# Patient Record
Sex: FEMALE | Race: WHITE | NOT HISPANIC OR LATINO | Employment: OTHER | URBAN - METROPOLITAN AREA
[De-identification: names, ages, dates, MRNs, and addresses within clinical notes are randomized per-mention and may not be internally consistent; named-entity substitution may affect disease eponyms.]

---

## 2017-01-13 RX ORDER — SYRINGE-NEEDLE,INSULIN,0.5 ML 27GX1/2"
1 SYRINGE, EMPTY DISPOSABLE MISCELLANEOUS 3 TIMES DAILY
Qty: 100 EACH | Refills: 3 | Status: SHIPPED | OUTPATIENT
Start: 2017-01-13 | End: 2018-01-29 | Stop reason: SDUPTHER

## 2017-01-16 ENCOUNTER — TELEPHONE (OUTPATIENT)
Dept: ENDOCRINOLOGY | Facility: CLINIC | Age: 72
End: 2017-01-16

## 2017-01-16 DIAGNOSIS — E11.65 UNCONTROLLED TYPE 2 DIABETES MELLITUS WITH COMPLICATION, UNSPECIFIED LONG TERM INSULIN USE STATUS: Primary | ICD-10-CM

## 2017-01-16 DIAGNOSIS — E11.8 UNCONTROLLED TYPE 2 DIABETES MELLITUS WITH COMPLICATION, UNSPECIFIED LONG TERM INSULIN USE STATUS: Primary | ICD-10-CM

## 2017-01-16 NOTE — TELEPHONE ENCOUNTER
----- Message from Blanche Garcia sent at 1/16/2017 12:15 PM EST -----  THE PATIENT IS CALLING IN REGARDS TO HER TOURJO AND HER NOVOLOG MEDICATION AND WOULD LIKE FOR YOU TO GIVE HER A CALL BACK TODAY. THE PATIENT IS STATING THAT SHE WILL BE OUT OF IT TODAY AND SHE IS NEEDING HER MEDICATION. HER CALL BACK NUMBER [phone]

## 2017-01-26 ENCOUNTER — OFFICE VISIT (OUTPATIENT)
Dept: ENDOCRINOLOGY | Facility: CLINIC | Age: 72
End: 2017-01-26

## 2017-01-26 VITALS
HEART RATE: 72 BPM | HEIGHT: 68 IN | WEIGHT: 214 LBS | OXYGEN SATURATION: 96 % | DIASTOLIC BLOOD PRESSURE: 80 MMHG | SYSTOLIC BLOOD PRESSURE: 140 MMHG | BODY MASS INDEX: 32.43 KG/M2

## 2017-01-26 DIAGNOSIS — G62.9 POLYNEUROPATHY: ICD-10-CM

## 2017-01-26 DIAGNOSIS — E11.8 UNCONTROLLED TYPE 2 DIABETES MELLITUS WITH COMPLICATION, UNSPECIFIED LONG TERM INSULIN USE STATUS: Primary | ICD-10-CM

## 2017-01-26 DIAGNOSIS — E11.65 UNCONTROLLED TYPE 2 DIABETES MELLITUS WITH COMPLICATION, UNSPECIFIED LONG TERM INSULIN USE STATUS: Primary | ICD-10-CM

## 2017-01-26 PROCEDURE — 99214 OFFICE O/P EST MOD 30 MIN: CPT | Performed by: INTERNAL MEDICINE

## 2017-01-26 NOTE — PROGRESS NOTES
"Subjective:     Chief Complaint   Patient presents with   • Diabetes     F/u for type 2 diabetes, pt stated that blood sugars have been running in the 200's. Fasting was 267 this a.m.      Romy Arias is a 71 y.o. female who is is here today for follow-up   for Type 2 diabetes mellitus.    The initial diagnosis of diabetes was made in [id number]  Diabetic complications: peripheral neuropathy and peripheral vascular disease    Current diabetic medications include  - Novolin 70/30 Relion - 80 units ac bkfst 10 -11 am & 80 units ac supper   metformin 500 mg - 1 tab bid without problems Checking blood sugar X 2/day.      Eye exam current (within one year): cataracts    Monitoring   2 times daily.    She forgot her meter today and her glucose log. Morning glucose is over 200, she takes insulin with meal.  Hypoglycemia: frequent nighttime hypoglycemia     Nutrition:   discussed  carb consistent diet 45-60 gm carb per meal. Doesnt eat breakfast, 2-3 pm she has lunch and a snack in the afternoon. Her schedule is inconsistent.   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     Past Medical History   Diagnosis Date   • Colon polyps    • Gallstone    • Rheumatic fever    • Sepsis due to urinary tract infection      The following portions of the patient's history were reviewed and updated as appropriate: allergies, current medications, past family history, past social history, past surgical history and problem list.    MEDICATIONS    Current Outpatient " "Prescriptions:   •  ALPRAZolam (XANAX) 0.25 MG tablet, Take  by mouth., Disp: , Rfl:   •  amitriptyline (ELAVIL) 75 MG tablet, Take  by mouth., Disp: , Rfl:   •  atenolol (TENORMIN) 50 MG tablet, Take  by mouth daily., Disp: , Rfl:   •  Cholecalciferol (VITAMIN D) 2000 UNITS capsule, Take  by mouth., Disp: , Rfl:   •  docusate sodium (DULCOLAX) 100 MG capsule, Take  by mouth 3 (three) times a day., Disp: , Rfl:   •  gabapentin (NEURONTIN) 400 MG capsule, , Disp: , Rfl: 0  •  insulin aspart (NOVOLOG FLEXPEN) 100 UNIT/ML solution pen-injector sc pen, Inject 20 Units under the skin 3 (Three) Times a Day With Meals., Disp: 30 mL, Rfl: 6  •  Insulin Glargine (TOUJEO SOLOSTAR) 300 UNIT/ML solution pen-injector, Inject 75 Units under the skin Daily., Disp: 30 mL, Rfl: 6  •  Insulin Pen Needle (BD PEN NEEDLE SAUL U/F) 32G X 4 MM misc, 1 each 5 (Five) Times a Day., Disp: 200 each, Rfl: 6  •  Insulin Syringe-Needle U-100 (BD INSULIN SYRINGE ULTRAFINE) 31G X 5/16\" 1 ML misc, 1 each 3 (Three) Times a Day., Disp: 100 each, Rfl: 3  •  lidocaine (LIDODERM) 5 %, Apply  topically., Disp: , Rfl:   •  lidocaine (XYLOCAINE) 5 % ointment, Apply  topically., Disp: , Rfl:   •  lisinopril-hydrochlorothiazide (PRINZIDE,ZESTORETIC) 20-12.5 MG per tablet, Take  by mouth daily., Disp: , Rfl:   •  metFORMIN (GLUCOPHAGE) 500 MG tablet, Take  by mouth., Disp: , Rfl:   •  Multiple Vitamin (MULTI-VITAMINS PO), Take  by mouth daily., Disp: , Rfl:   •  mupirocin (BACTROBAN) 2 % ointment, , Disp: , Rfl: 0  •  Mupirocin 2 % kit, Apply  topically 2 (two) times a day., Disp: , Rfl:   •  omeprazole OTC (PRILOSEC OTC) 20 MG EC tablet, Take  by mouth daily., Disp: , Rfl:   •  ondansetron (ZOFRAN) 8 MG tablet, Take  by mouth., Disp: , Rfl:   •  vitamin B-12 (CYANOCOBALAMIN) 100 MCG tablet, Take  by mouth daily., Disp: , Rfl:     Review of Systems  Review of Systems   Constitutional: Positive for fatigue and unexpected weight change.   HENT: Negative for " "congestion.    Eyes: Negative for visual disturbance.   Respiratory: Negative.  Negative for shortness of breath.    Cardiovascular: Negative.  Negative for chest pain and leg swelling.   Gastrointestinal: Negative for constipation, diarrhea and nausea.   Endocrine: Negative for cold intolerance, polydipsia and polyuria.   Musculoskeletal: Positive for arthralgias and back pain. Negative for joint swelling and myalgias.   Skin: Negative for rash and wound.   Allergic/Immunologic: Positive for environmental allergies.   Neurological: Positive for numbness. Negative for headaches.   Hematological: Negative.    Psychiatric/Behavioral: Negative for behavioral problems and self-injury.        Anxiety          Objective:        Visit Vitals   • /80   • Pulse 72   • Ht 68\" (172.7 cm)   • Wt 214 lb (97.1 kg)   • SpO2 96%   • BMI 32.54 kg/m2     Physical Exam   Constitutional: She is oriented to person, place, and time. She appears well-developed and well-nourished.   HENT:   Head: Normocephalic and atraumatic.   Neck: No tracheal deviation present. No thyromegaly present.   Cardiovascular: Normal rate, regular rhythm and normal heart sounds.    Pulmonary/Chest: Effort normal and breath sounds normal.   Musculoskeletal: Normal range of motion. She exhibits no edema, tenderness or deformity.   Neurological: She is alert and oriented to person, place, and time.   Skin: Skin is warm and dry.   Psychiatric: She has a normal mood and affect. Her behavior is normal. Judgment and thought content normal.   Nursing note and vitals reviewed.          LABS AND IMAGING    Office Visit on 12/06/2016   Component Date Value Ref Range Status   • Glucose 12/06/2016 268* 70 - 130 mg/dL Final   • Hemoglobin A1C 12/06/2016 9.4  % Final                 Assessment:         Diagnoses and all orders for this visit:    Uncontrolled type 2 diabetes mellitus with complication, unspecified long term insulin use " status    Polyneuropathy        Plan:      1.  RX changes: . Detailed instructions provided in written and verbal form. Insulin pen use and administration reviewed and taught.            Patient Instructions     Diabetes Treatment Recommendations  Patient     Romy Arias     Date:        01/26/17     ADA General Goals: A1c: < 7%                                                                                   Your A1C is   Lab Results   Component Value Date    HGBA1C 9.4 12/06/2016    HGBA1C 8.4 08/23/2016       Fasting/before meal glucose: <150 mg/dL                                    2 Hour after meal glucoses: < 180 mg/dL                                        Bedtime glucose:120-180                                                                Glucose testing frequency:     Medication Changes: discontinue Novolin 70/30    Insulin dosing:  Basal insulin Levemir , Toujeo , Lantus , Tresiba (U-200) 80 Units daily            Meal Insulin Apidra, Humalog (U-200), Novolog  or Regular   25 units units before meals, 15 units before snack   Correction insulin (add to meal insulin)   0 unit  if BS  less than 150   2unit   if BS  150 - 199   4 units if  - 249   6 units if  - 299   8 units if  - 349   10  units if BS Greater than 350                         Nutritional Recommendations:   3-4 carb portions per meal (45-60 gm of carbs) female    Physical Activity Goals:  Walk at least 15 min every day, ideally exercise 45-60 min most days (could be done in short bouts of 10-15 minutes.    Keep records of your glucose levels and insulin adjustments. We may ask you to keep records on the content of your meals with insulin doses and before/after meal glucose levels to evaluate your ratios.  Call for advice if you have unexplained or unexpected hypoglycemia  (glucose < 60) or persistent high glucose > 300.  Office: [phone]      Shelly Nichole MD              2.  Follow up:  3 months.

## 2017-01-26 NOTE — PATIENT INSTRUCTIONS
Diabetes Treatment Recommendations  Patient     Romy Arias     Date:        01/26/17     ADA General Goals: A1c: < 7%                                                                                   Your A1C is   Lab Results   Component Value Date    HGBA1C 9.4 12/06/2016    HGBA1C 8.4 08/23/2016       Fasting/before meal glucose: <150 mg/dL                                    2 Hour after meal glucoses: < 180 mg/dL                                        Bedtime glucose:120-180                                                                Glucose testing frequency:     Medication Changes: discontinue Novolin 70/30    Insulin dosing:  Basal insulin Levemir , Toujeo , Lantus , Tresiba (U-200) 80 Units daily            Meal Insulin Apidra, Humalog (U-200), Novolog  or Regular   25 units units before meals, 15 units before snack   Correction insulin (add to meal insulin)   0 unit  if BS  less than 150   2unit   if BS  150 - 199   4 units if  - 249   6 units if  - 299   8 units if  - 349   10  units if BS Greater than 350                         Nutritional Recommendations:   3-4 carb portions per meal (45-60 gm of carbs) female    Physical Activity Goals:  Walk at least 15 min every day, ideally exercise 45-60 min most days (could be done in short bouts of 10-15 minutes.    Keep records of your glucose levels and insulin adjustments. We may ask you to keep records on the content of your meals with insulin doses and before/after meal glucose levels to evaluate your ratios.  Call for advice if you have unexplained or unexpected hypoglycemia  (glucose < 60) or persistent high glucose > 300.  Office: [phone]      Shelly Nichole MD

## 2017-01-26 NOTE — LETTER
Patient Instructions     Diabetes Treatment Recommendations  Patient     Romy Arias     Date:        01/26/17     Fasting/before meal glucose: <150 mg/dL                                    2 Hour after meal glucoses: < 180 mg/dL                                        Bedtime glucose:120-180                                                                Glucose testing frequency:     Insulin dosing:  Basal insulin Levemir , Toujeo , Lantus , Tresiba (U-200) 80 Units daily            Meal Insulin Apidra, Humalog (U-200), Novolog  or Regular   25 units units before meals, 15 units before snack   Correction insulin (add to meal insulin)   0 unit  if BS  less than 150   2unit   if BS  150 - 199   4 units if  - 249   6 units if  - 299   8 units if  - 349   10  units if BS Greater than 350                         Nutritional Recommendations:   3-4 carb portions per meal (45-60 gm of carbs) female    Physical Activity Goals:  Walk at least 15 min every day, ideally exercise 45-60 min most days (could be done in short bouts of 10-15 minutes.    Keep records of your glucose levels and insulin adjustments. We may ask you to keep records on the content of your meals with insulin doses and before/after meal glucose levels to evaluate your ratios.  Call for advice if you have unexplained or unexpected hypoglycemia  (glucose < 60) or persistent high glucose > 300.  Office: [phone]      MD Shelly Bravo MD

## 2017-01-26 NOTE — MR AVS SNAPSHOT
"                        Romy Arias   1/26/2017 10:00 AM   Office Visit    Dept Phone:  [phone]   Encounter #:  [id number]    Provider:  Shelly CLEMENTS MD   Department:  [facility] INTERNAL MEDICINE AND ENDOCRINOLOGY                Your Full Care Plan              Today's Medication Changes          These changes are accurate as of: 1/26/17 10:49 AM.  If you have any questions, ask your nurse or doctor.               Stop taking medication(s)listed here:     omeprazole 20 MG capsule   Commonly known as:  priLOSEC   Stopped by:  Shelly CLEMENTS MD           OXYCONTIN 40 MG 12 hr tablet   Generic drug:  oxyCODONE ER   Stopped by:  Shelly CLEMENTS MD                      Your Updated Medication List          This list is accurate as of: 1/26/17 10:49 AM.  Always use your most recent med list.                ALPRAZolam 0.25 MG tablet   Commonly known as:  XANAX       amitriptyline 75 MG tablet   Commonly known as:  ELAVIL       atenolol 50 MG tablet   Commonly known as:  TENORMIN       DULCOLAX 100 MG capsule   Generic drug:  docusate sodium       gabapentin 400 MG capsule   Commonly known as:  NEURONTIN       insulin aspart 100 UNIT/ML solution pen-injector sc pen   Commonly known as:  NOVOLOG FLEXPEN   Inject 20 Units under the skin 3 (Three) Times a Day With Meals.       Insulin Glargine 300 UNIT/ML solution pen-injector   Commonly known as:  TOUJEO SOLOSTAR   Inject 75 Units under the skin Daily.       Insulin Pen Needle 32G X 4 MM misc   Commonly known as:  BD PEN NEEDLE SAUL U/F   1 each 5 (Five) Times a Day.       Insulin Syringe-Needle U-100 31G X 5/16\" 1 ML misc   Commonly known as:  BD INSULIN SYRINGE ULTRAFINE   1 each 3 (Three) Times a Day.       * lidocaine 5 %   Commonly known as:  LIDODERM       * lidocaine 5 % ointment   Commonly known as:  XYLOCAINE       lisinopril-hydrochlorothiazide 20-12.5 MG per tablet   Commonly known as:  PRINZIDE,ZESTORETIC       metFORMIN 500 MG tablet "   Commonly known as:  GLUCOPHAGE       MULTI-VITAMINS PO       * Mupirocin 2 % kit       * mupirocin 2 % ointment   Commonly known as:  BACTROBAN       ondansetron 8 MG tablet   Commonly known as:  ZOFRAN       PRILOSEC OTC 20 MG EC tablet   Generic drug:  omeprazole OTC       vitamin B-12 100 MCG tablet   Commonly known as:  CYANOCOBALAMIN       Vitamin D 2000 UNITS capsule       * Notice:  This list has 4 medication(s) that are the same as other medications prescribed for you. Read the directions carefully, and ask your doctor or other care provider to review them with you.            You Were Diagnosed With        Codes Comments    Uncontrolled type 2 diabetes mellitus with complication, unspecified long term insulin use status    -  Primary ICD-10-CM: E11.8, E11.65  ICD-9-CM: 250.92     Polyneuropathy     ICD-10-CM: G62.9  ICD-9-CM: 356.9       Instructions    Diabetes Treatment Recommendations  Patient     Romy Arias     Date:        01/26/17     ADA General Goals: A1c: < 7%                                                                                   Your A1C is   Lab Results   Component Value Date    HGBA1C 9.4 12/06/2016    HGBA1C 8.4 08/23/2016       Fasting/before meal glucose: <150 mg/dL                                    2 Hour after meal glucoses: < 180 mg/dL                                        Bedtime glucose:120-180                                                                Glucose testing frequency:     Medication Changes: discontinue Novolin 70/30    Insulin dosing:  Basal insulin Levemir , Toujeo , Lantus , Tresiba (U-200) 80 Units daily            Meal Insulin Apidra, Humalog (U-200), Novolog  or Regular   25 units units before meals, 15 units before snack   Correction insulin (add to meal insulin)   0 unit  if BS  less than 150   2unit   if BS  150 - 199   4 units if  - 249   6 units if  - 299   8 units if  - 349   10  units if BS Greater than 350                          Nutritional Recommendations:   3-4 carb portions per meal (45-60 gm of carbs) female    Physical Activity Goals:  Walk at least 15 min every day, ideally exercise 45-60 min most days (could be done in short bouts of 10-15 minutes.    Keep records of your glucose levels and insulin adjustments. We may ask you to keep records on the content of your meals with insulin doses and before/after meal glucose levels to evaluate your ratios.  Call for advice if you have unexplained or unexpected hypoglycemia  (glucose < 60) or persistent high glucose > 300.  Office: [phone]      Shelly Nichole MD         Patient Instructions History      Upcoming Appointments     Visit Type Date Time Department    FOLLOW UP 2017 10:00 AM [facility]    FOLLOW UP 2017 11:15 AM [facility]      MyChart Signup     [facility] [employer] allows you to send messages to your doctor, view your test results, renew your prescriptions, schedule appointments, and more. To sign up, go to [employer] and click on the Sign Up Now link in the New User? box. Enter your [employer] Activation Code exactly as it appears below along with the last four digits of your Social Security Number and your Date of Birth () to complete the sign-up process. If you do not sign up before the expiration date, you must request a new code.    [employer] Activation Code: [email or web address]  Expires: 2017 10:49 AM    If you have questions, you can email [email or web address] or call [phone] to talk to our [employer] staff. Remember, [employer] is NOT to be used for urgent needs. For medical emergencies, dial 911.               Other Info from Your Visit           Your Appointments     May 30, 2017 11:15 AM EDT   Follow Up with Shelly CLEMENTS MD   [facility]OCRINOLOG[facility] (--)    [address] [place] [id number] [phone]           Arrive 15 minutes  "prior to appointment.              Allergies     Demerol [Meperidine]      Dilantin [Phenytoin Sodium Extended]      Lyrica [Pregabalin]      Ultram [Tramadol]      Vioxx [Rofecoxib]        Reason for Visit     Diabetes F/u for type 2 diabetes, pt stated that blood sugars have been running in the 200's. Fasting was 267 this a.m.      Vital Signs     Blood Pressure Pulse Height Weight Oxygen Saturation Body Mass Index    140/80 72 68\" (172.7 cm) 214 lb (97.1 kg) 96% 32.54 kg/m2    Smoking Status                   Former Smoker           Problems and Diagnoses Noted     Diabetes with complication    Polyneuropathy        "

## 2017-03-02 ENCOUNTER — OFFICE VISIT (OUTPATIENT)
Dept: ENDOCRINOLOGY | Facility: CLINIC | Age: 72
End: 2017-03-02

## 2017-03-02 VITALS
OXYGEN SATURATION: 97 % | HEART RATE: 78 BPM | WEIGHT: 213.8 LBS | DIASTOLIC BLOOD PRESSURE: 80 MMHG | HEIGHT: 68 IN | SYSTOLIC BLOOD PRESSURE: 140 MMHG | BODY MASS INDEX: 32.4 KG/M2

## 2017-03-02 DIAGNOSIS — E11.65 UNCONTROLLED TYPE 2 DIABETES MELLITUS WITH COMPLICATION, UNSPECIFIED LONG TERM INSULIN USE STATUS: Primary | ICD-10-CM

## 2017-03-02 DIAGNOSIS — G62.9 POLYNEUROPATHY: ICD-10-CM

## 2017-03-02 DIAGNOSIS — E11.8 UNCONTROLLED TYPE 2 DIABETES MELLITUS WITH COMPLICATION, UNSPECIFIED LONG TERM INSULIN USE STATUS: Primary | ICD-10-CM

## 2017-03-02 DIAGNOSIS — I73.9 PERIPHERAL VASCULAR DISEASE (HCC): ICD-10-CM

## 2017-03-02 LAB
GLUCOSE BLDC GLUCOMTR-MCNC: 284 MG/DL (ref 70–130)
HBA1C MFR BLD: 10.5 %

## 2017-03-02 PROCEDURE — 82947 ASSAY GLUCOSE BLOOD QUANT: CPT | Performed by: INTERNAL MEDICINE

## 2017-03-02 PROCEDURE — 83036 HEMOGLOBIN GLYCOSYLATED A1C: CPT | Performed by: INTERNAL MEDICINE

## 2017-03-02 PROCEDURE — 99214 OFFICE O/P EST MOD 30 MIN: CPT | Performed by: INTERNAL MEDICINE

## 2017-03-02 NOTE — PATIENT INSTRUCTIONS
Diabetes Treatment Recommendations  Patient     Romy Arias     Date:        03/02/17     ADA General Goals: A1c: < 7%                                                                                   Your A1C is   Lab Results   Component Value Date    HGBA1C 10.5 03/02/2017    HGBA1C 9.4 12/06/2016    HGBA1C 8.4 08/23/2016       Fasting/before meal glucose: <150 mg/dL                                    2 Hour after meal glucoses: < 180 mg/dL                                        Bedtime glucose:120-180                                                                Glucose testing frequency:     Medication Changes: discontinue Novolin 70/30    Insulin dosing:  Basal insulin Levemir , Toujeo , Lantus , Tresiba (U-200) 80 Units daily            Meal Insulin Apidra, Humalog (U-200), Novolog  or Regular   35 units units before meals, 15 units before snack   Correction insulin (add to meal insulin)   0 unit  if BS  less than 150   2unit   if BS  150 - 199   4 units if  - 249   6 units if  - 299   8 units if  - 349   10  units if BS Greater than 350                         Nutritional Recommendations:   3-4 carb portions per meal (45-60 gm of carbs) female    Physical Activity Goals:  Walk at least 15 min every day, ideally exercise 45-60 min most days (could be done in short bouts of 10-15 minutes.    Keep records of your glucose levels and insulin adjustments. We may ask you to keep records on the content of your meals with insulin doses and before/after meal glucose levels to evaluate your ratios.  Call for advice if you have unexplained or unexpected hypoglycemia  (glucose < 60) or persistent high glucose > 300.  Office: [phone]      Shelly Nichole MD

## 2017-03-02 NOTE — PROGRESS NOTES
"Subjective:     Chief Complaint   Patient presents with   • Diabetes     F/u for type 2 diabetes, testing 2x qd. C/o elevations in blood sugar readings, pt stated she has been eating more sweets than normal.       Romy Arias is a 71 y.o. female who is is here today for follow-up   for Type 2 diabetes mellitus.    The initial diagnosis of diabetes was made in [id number]  Diabetic complications: peripheral neuropathy and peripheral vascular disease    Current diabetic medications include  - Novolin 70/30 Relion - 80 units ac bkfst 10 -11 am & 80 units ac supper   metformin 500 mg - 1 tab bid without problems Checking blood sugar X 2/day.      Eye exam current (within one year): cataracts    Monitoring   2 times daily.    She forgot her meter today and her glucose log. Morning glucose is over 200, she takes insulin with meal.  Hypoglycemia: frequent nighttime hypoglycemia     Nutrition:   discussed  carb consistent diet 45-60 gm carb per meal. Doesnt eat breakfast, 2-3 pm she has lunch and a snack in the afternoon. Her schedule is inconsistent.   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     Past Medical History   Diagnosis Date   • Colon polyps    • Gallstone    • Rheumatic fever    • Sepsis due to urinary tract infection      The following portions of the patient's history were reviewed and updated as appropriate: allergies, current medications, past family history, past social history, past surgical history and problem list.    MEDICATIONS    Current Outpatient Prescriptions:   •  ALPRAZolam (XANAX) 0.25 MG tablet, Take  by mouth., Disp: , Rfl:   •  amitriptyline (ELAVIL) 75 MG tablet, Take  by mouth., Disp: , Rfl:   •  atenolol (TENORMIN) 50 MG tablet, " "Take  by mouth daily., Disp: , Rfl:   •  Cholecalciferol (VITAMIN D) 2000 UNITS capsule, Take  by mouth., Disp: , Rfl:   •  docusate sodium (DULCOLAX) 100 MG capsule, Take  by mouth 3 (three) times a day., Disp: , Rfl:   •  gabapentin (NEURONTIN) 400 MG capsule, , Disp: , Rfl: 0  •  insulin aspart (NOVOLOG FLEXPEN) 100 UNIT/ML solution pen-injector sc pen, Inject 30 Units under the skin 3 (Three) Times a Day Before Meals., Disp: 10 pen, Rfl: 6  •  Insulin Glargine (TOUJEO SOLOSTAR) 300 UNIT/ML solution pen-injector, Inject 80 Units under the skin Daily., Disp: 10 pen, Rfl: 6  •  Insulin Pen Needle (BD PEN NEEDLE SAUL U/F) 32G X 4 MM misc, 1 each 5 (Five) Times a Day., Disp: 200 each, Rfl: 6  •  Insulin Syringe-Needle U-100 (BD INSULIN SYRINGE ULTRAFINE) 31G X 5/16\" 1 ML misc, 1 each 3 (Three) Times a Day., Disp: 100 each, Rfl: 3  •  lidocaine (LIDODERM) 5 %, Apply  topically., Disp: , Rfl:   •  lidocaine (XYLOCAINE) 5 % ointment, Apply  topically., Disp: , Rfl:   •  lisinopril-hydrochlorothiazide (PRINZIDE,ZESTORETIC) 20-12.5 MG per tablet, Take  by mouth daily., Disp: , Rfl:   •  metFORMIN (GLUCOPHAGE) 500 MG tablet, Take 1 tablet by mouth Daily With Breakfast., Disp: 90 tablet, Rfl: 1  •  Multiple Vitamin (MULTI-VITAMINS PO), Take  by mouth daily., Disp: , Rfl:   •  mupirocin (BACTROBAN) 2 % ointment, , Disp: , Rfl: 0  •  Mupirocin 2 % kit, Apply  topically 2 (two) times a day., Disp: , Rfl:   •  omeprazole OTC (PRILOSEC OTC) 20 MG EC tablet, Take  by mouth daily., Disp: , Rfl:   •  ondansetron (ZOFRAN) 8 MG tablet, Take  by mouth., Disp: , Rfl:   •  vitamin B-12 (CYANOCOBALAMIN) 100 MCG tablet, Take  by mouth daily., Disp: , Rfl:     Review of Systems  Review of Systems   Constitutional: Positive for fatigue and unexpected weight change.   HENT: Negative for congestion.    Eyes: Negative for visual disturbance.   Respiratory: Negative.  Negative for shortness of breath.    Cardiovascular: Negative.  Negative for " "chest pain and leg swelling.   Gastrointestinal: Negative for constipation, diarrhea and nausea.   Endocrine: Negative for cold intolerance, polydipsia and polyuria.   Musculoskeletal: Positive for arthralgias and back pain. Negative for joint swelling and myalgias.   Skin: Negative for rash and wound.   Allergic/Immunologic: Positive for environmental allergies.   Neurological: Positive for numbness. Negative for headaches.   Hematological: Negative.    Psychiatric/Behavioral: Negative for behavioral problems and self-injury.        Anxiety          Objective:        Visit Vitals   • /80   • Pulse 78   • Ht 68\" (172.7 cm)   • Wt 213 lb 12.8 oz (97 kg)   • SpO2 97%   • BMI 32.51 kg/m2     Physical Exam   Constitutional: She is oriented to person, place, and time. She appears well-developed and well-nourished.   HENT:   Head: Normocephalic and atraumatic.   Neck: No tracheal deviation present. No thyromegaly present.   Cardiovascular: Normal rate, regular rhythm and normal heart sounds.    Pulmonary/Chest: Effort normal and breath sounds normal.   Musculoskeletal: Normal range of motion. She exhibits no edema, tenderness or deformity.   Neurological: She is alert and oriented to person, place, and time.   Skin: Skin is warm and dry.   Psychiatric: She has a normal mood and affect. Her behavior is normal. Judgment and thought content normal.   Nursing note and vitals reviewed.          LABS AND IMAGING    Office Visit on 03/02/2017   Component Date Value Ref Range Status   • Glucose 03/02/2017 284* 70 - 130 mg/dL Final   • Hemoglobin A1C 03/02/2017 10.5  % Final   Office Visit on 12/06/2016   Component Date Value Ref Range Status   • Glucose 12/06/2016 268* 70 - 130 mg/dL Final   • Hemoglobin A1C 12/06/2016 9.4  % Final                 Assessment:         Diagnoses and all orders for this visit:    Uncontrolled type 2 diabetes mellitus with complication, unspecified long term insulin use status  -     POC " Glucose Fingerstick  -     POC Glycosylated Hemoglobin (Hb A1C)  -     insulin aspart (NOVOLOG FLEXPEN) 100 UNIT/ML solution pen-injector sc pen; Inject 30 Units under the skin 3 (Three) Times a Day Before Meals.  -     Insulin Glargine (TOUJEO SOLOSTAR) 300 UNIT/ML solution pen-injector; Inject 80 Units under the skin Daily.    Polyneuropathy    Peripheral vascular disease        Plan:      1.  RX changes: . Detailed instructions provided in written and verbal form. Insulin pen use and administration reviewed and taught.            Patient Instructions     Diabetes Treatment Recommendations  Patient     Romy Arias     Date:        03/02/17     ADA General Goals: A1c: < 7%                                                                                   Your A1C is   Lab Results   Component Value Date    HGBA1C 10.5 03/02/2017    HGBA1C 9.4 12/06/2016    HGBA1C 8.4 08/23/2016       Fasting/before meal glucose: <150 mg/dL                                    2 Hour after meal glucoses: < 180 mg/dL                                        Bedtime glucose:120-180                                                                Glucose testing frequency:     Medication Changes: discontinue Novolin 70/30    Insulin dosing:  Basal insulin Levemir , Toujeo , Lantus , Tresiba (U-200) 80 Units daily            Meal Insulin Apidra, Humalog (U-200), Novolog  or Regular   35 units units before meals, 15 units before snack   Correction insulin (add to meal insulin)   0 unit  if BS  less than 150   2unit   if BS  150 - 199   4 units if  - 249   6 units if  - 299   8 units if  - 349   10  units if BS Greater than 350                         Nutritional Recommendations:   3-4 carb portions per meal (45-60 gm of carbs) female    Physical Activity Goals:  Walk at least 15 min every day, ideally exercise 45-60 min most days (could be done in short bouts of 10-15 minutes.    Keep records of your glucose levels and  insulin adjustments. We may ask you to keep records on the content of your meals with insulin doses and before/after meal glucose levels to evaluate your ratios.  Call for advice if you have unexplained or unexpected hypoglycemia  (glucose < 60) or persistent high glucose > 300.  Office: [phone]      Shelly Nichole MD              2.  Follow up:  3 months.

## 2017-03-30 ENCOUNTER — TELEPHONE (OUTPATIENT)
Dept: ENDOCRINOLOGY | Facility: CLINIC | Age: 72
End: 2017-03-30

## 2017-03-30 NOTE — TELEPHONE ENCOUNTER
Returned pt's call and informed her that we currently did not have any samples but should have some tomorrow or by the beginning of next week. Pt said that she will give our office a call back tomorrow.

## 2017-03-30 NOTE — TELEPHONE ENCOUNTER
----- Message from Brisa Odell sent at 3/30/2017 11:17 AM EDT -----  Contact: PATIENT   RE: SAMPLES    PATIENT IS REQUESTING 4 SAMPLES OF NOVOLOG FLEX PEN. SHE STATES THIS COSTS TOO MUCH FOR HER TO PURCHASE FROM PHARMACY.    CALL BACK #[phone]

## 2017-04-10 ENCOUNTER — TELEPHONE (OUTPATIENT)
Dept: INTERNAL MEDICINE | Facility: CLINIC | Age: 72
End: 2017-04-10

## 2017-04-10 NOTE — TELEPHONE ENCOUNTER
Returned pt's call and informed her that I have placed samples in the fridge for her to  at her convenience.

## 2017-04-10 NOTE — TELEPHONE ENCOUNTER
----- Message from Blanche Garcia sent at 4/10/2017  1:32 PM EDT -----  THE PATIENT IS CALLING IN REGARDS TO HER NOVOLOG SAMPLES AND WOULD LIKE FOR YOU TO GIVE HER A CALL WHEN THERE READY FOR . THE PATIENT'S CALL BACK NUMBER -[phone]

## 2017-05-22 ENCOUNTER — OFFICE VISIT (OUTPATIENT)
Dept: ENDOCRINOLOGY | Facility: CLINIC | Age: 72
End: 2017-05-22

## 2017-05-22 VITALS
HEIGHT: 67 IN | BODY MASS INDEX: 33.71 KG/M2 | OXYGEN SATURATION: 98 % | WEIGHT: 214.8 LBS | SYSTOLIC BLOOD PRESSURE: 132 MMHG | DIASTOLIC BLOOD PRESSURE: 80 MMHG | HEART RATE: 76 BPM

## 2017-05-22 DIAGNOSIS — E11.65 UNCONTROLLED TYPE 2 DIABETES MELLITUS WITH COMPLICATION, UNSPECIFIED LONG TERM INSULIN USE STATUS: Primary | ICD-10-CM

## 2017-05-22 DIAGNOSIS — E11.8 UNCONTROLLED TYPE 2 DIABETES MELLITUS WITH COMPLICATION, UNSPECIFIED LONG TERM INSULIN USE STATUS: Primary | ICD-10-CM

## 2017-05-22 DIAGNOSIS — G62.9 POLYNEUROPATHY: ICD-10-CM

## 2017-05-22 LAB
GLUCOSE BLDC GLUCOMTR-MCNC: 395 MG/DL (ref 70–130)
HBA1C MFR BLD: 10.6 %

## 2017-05-22 PROCEDURE — 82947 ASSAY GLUCOSE BLOOD QUANT: CPT | Performed by: INTERNAL MEDICINE

## 2017-05-22 PROCEDURE — 99214 OFFICE O/P EST MOD 30 MIN: CPT | Performed by: INTERNAL MEDICINE

## 2017-05-22 PROCEDURE — 83036 HEMOGLOBIN GLYCOSYLATED A1C: CPT | Performed by: INTERNAL MEDICINE

## 2017-05-24 ENCOUNTER — TELEPHONE (OUTPATIENT)
Dept: INTERNAL MEDICINE | Facility: CLINIC | Age: 72
End: 2017-05-24

## 2017-07-03 ENCOUNTER — TELEPHONE (OUTPATIENT)
Dept: INTERNAL MEDICINE | Facility: CLINIC | Age: 72
End: 2017-07-03

## 2017-07-03 NOTE — TELEPHONE ENCOUNTER
PATIENT WOULD LIKE A REFILL ON METFORMIN-HCL-500 MG TABLET ( 1 A DAY). PATIENT WOULD LIKE IT SENT TO [facility] IN [facility]. PATIENT WOULD LIKE A CALL ABCK WHEN THE PRESCRIPTION HAS BEEN FILLED. THANK YOU.

## 2017-07-27 DIAGNOSIS — E11.8 UNCONTROLLED TYPE 2 DIABETES MELLITUS WITH COMPLICATION, UNSPECIFIED LONG TERM INSULIN USE STATUS: ICD-10-CM

## 2017-07-27 DIAGNOSIS — E11.65 UNCONTROLLED TYPE 2 DIABETES MELLITUS WITH COMPLICATION, UNSPECIFIED LONG TERM INSULIN USE STATUS: ICD-10-CM

## 2017-07-27 NOTE — TELEPHONE ENCOUNTER
DR MONTES DE OCA,    MS SERG CALLED FOR REFILL, SHE IS USING A SLIDING SCALE AND IS RUNNING OUT OF Stand In EARLY. SHE WOULD ALSO LIKE TO KNOW IF IT WOULD BE POSSIBLE TO MAIL SAMPLES TO HER.

## 2017-07-28 DIAGNOSIS — E11.65 UNCONTROLLED TYPE 2 DIABETES MELLITUS WITH COMPLICATION, UNSPECIFIED LONG TERM INSULIN USE STATUS: ICD-10-CM

## 2017-07-28 DIAGNOSIS — E11.8 UNCONTROLLED TYPE 2 DIABETES MELLITUS WITH COMPLICATION, UNSPECIFIED LONG TERM INSULIN USE STATUS: ICD-10-CM

## 2017-09-20 ENCOUNTER — HOSPITAL ENCOUNTER (INPATIENT)
Facility: HOSPITAL | Age: 72
LOS: 3 days | Discharge: NURSING, REHAB OR LONG-TERM CARE | End: 2017-09-23
Attending: FAMILY MEDICINE | Admitting: INTERNAL MEDICINE

## 2017-09-20 ENCOUNTER — APPOINTMENT (OUTPATIENT)
Dept: CT IMAGING | Facility: HOSPITAL | Age: 72
End: 2017-09-20

## 2017-09-20 DIAGNOSIS — Z74.09 IMPAIRED FUNCTIONAL MOBILITY, BALANCE, GAIT, AND ENDURANCE: ICD-10-CM

## 2017-09-20 DIAGNOSIS — Z74.09 IMPAIRED MOBILITY AND ADLS: ICD-10-CM

## 2017-09-20 DIAGNOSIS — R13.12 OROPHARYNGEAL DYSPHAGIA: ICD-10-CM

## 2017-09-20 DIAGNOSIS — R13.10 DYSPHAGIA, UNSPECIFIED: Primary | ICD-10-CM

## 2017-09-20 DIAGNOSIS — Z78.9 IMPAIRED MOBILITY AND ADLS: ICD-10-CM

## 2017-09-20 DIAGNOSIS — R41.841 COGNITIVE COMMUNICATION DEFICIT: ICD-10-CM

## 2017-09-20 PROBLEM — I63.9 CVA (CEREBRAL VASCULAR ACCIDENT) (HCC): Status: ACTIVE | Noted: 2017-09-20

## 2017-09-20 LAB
ALBUMIN SERPL-MCNC: 3.4 G/DL (ref 3.2–4.8)
ALBUMIN/GLOB SERPL: 1.1 G/DL (ref 1.5–2.5)
ALP SERPL-CCNC: 140 U/L (ref 25–100)
ALT SERPL W P-5'-P-CCNC: 20 U/L (ref 7–40)
ANION GAP SERPL CALCULATED.3IONS-SCNC: 1 MMOL/L (ref 3–11)
AST SERPL-CCNC: 24 U/L (ref 0–33)
BILIRUB SERPL-MCNC: 0.3 MG/DL (ref 0.3–1.2)
BUN BLD-MCNC: 20 MG/DL (ref 9–23)
BUN/CREAT SERPL: 18.2 (ref 7–25)
CALCIUM SPEC-SCNC: 8.5 MG/DL (ref 8.7–10.4)
CHLORIDE SERPL-SCNC: 99 MMOL/L (ref 99–109)
CO2 SERPL-SCNC: 33 MMOL/L (ref 20–31)
CREAT BLD-MCNC: 1.1 MG/DL (ref 0.6–1.3)
DEPRECATED RDW RBC AUTO: 43 FL (ref 37–54)
ERYTHROCYTE [DISTWIDTH] IN BLOOD BY AUTOMATED COUNT: 13.5 % (ref 11.3–14.5)
GFR SERPL CREATININE-BSD FRML MDRD: 49 ML/MIN/1.73
GLOBULIN UR ELPH-MCNC: 3.1 GM/DL
GLUCOSE BLD-MCNC: 276 MG/DL (ref 70–100)
GLUCOSE BLDC GLUCOMTR-MCNC: 139 MG/DL (ref 70–130)
GLUCOSE BLDC GLUCOMTR-MCNC: 176 MG/DL (ref 70–130)
GLUCOSE BLDC GLUCOMTR-MCNC: 261 MG/DL (ref 70–130)
HCT VFR BLD AUTO: 41.9 % (ref 34.5–44)
HGB BLD-MCNC: 12.9 G/DL (ref 11.5–15.5)
MCH RBC QN AUTO: 26.7 PG (ref 27–31)
MCHC RBC AUTO-ENTMCNC: 30.8 G/DL (ref 32–36)
MCV RBC AUTO: 86.6 FL (ref 80–99)
PLATELET # BLD AUTO: 261 10*3/MM3 (ref 150–450)
PMV BLD AUTO: 9.7 FL (ref 6–12)
POTASSIUM BLD-SCNC: 4.3 MMOL/L (ref 3.5–5.5)
PROT SERPL-MCNC: 6.5 G/DL (ref 5.7–8.2)
RBC # BLD AUTO: 4.84 10*6/MM3 (ref 3.89–5.14)
SODIUM BLD-SCNC: 133 MMOL/L (ref 132–146)
WBC NRBC COR # BLD: 8.05 10*3/MM3 (ref 3.5–10.8)

## 2017-09-20 PROCEDURE — 92523 SPEECH SOUND LANG COMPREHEN: CPT

## 2017-09-20 PROCEDURE — 70498 CT ANGIOGRAPHY NECK: CPT

## 2017-09-20 PROCEDURE — 80053 COMPREHEN METABOLIC PANEL: CPT | Performed by: NURSE PRACTITIONER

## 2017-09-20 PROCEDURE — 99223 1ST HOSP IP/OBS HIGH 75: CPT | Performed by: PSYCHIATRY & NEUROLOGY

## 2017-09-20 PROCEDURE — 25010000002 HYDROMORPHONE PER 4 MG: Performed by: HOSPITALIST

## 2017-09-20 PROCEDURE — 85027 COMPLETE CBC AUTOMATED: CPT | Performed by: NURSE PRACTITIONER

## 2017-09-20 PROCEDURE — 99223 1ST HOSP IP/OBS HIGH 75: CPT | Performed by: FAMILY MEDICINE

## 2017-09-20 PROCEDURE — 0 IOPAMIDOL PER 1 ML: Performed by: FAMILY MEDICINE

## 2017-09-20 PROCEDURE — 70496 CT ANGIOGRAPHY HEAD: CPT

## 2017-09-20 PROCEDURE — 0042T HC CT CEREBRAL PERFUSION W/WO CONTRAST: CPT

## 2017-09-20 PROCEDURE — 92610 EVALUATE SWALLOWING FUNCTION: CPT

## 2017-09-20 PROCEDURE — 99221 1ST HOSP IP/OBS SF/LOW 40: CPT | Performed by: NURSE PRACTITIONER

## 2017-09-20 PROCEDURE — 82962 GLUCOSE BLOOD TEST: CPT

## 2017-09-20 RX ORDER — HYDROMORPHONE HYDROCHLORIDE 1 MG/ML
0.5 INJECTION, SOLUTION INTRAMUSCULAR; INTRAVENOUS; SUBCUTANEOUS
Status: DISCONTINUED | OUTPATIENT
Start: 2017-09-20 | End: 2017-09-23 | Stop reason: HOSPADM

## 2017-09-20 RX ORDER — DEXTROSE MONOHYDRATE 25 G/50ML
25 INJECTION, SOLUTION INTRAVENOUS
Status: DISCONTINUED | OUTPATIENT
Start: 2017-09-20 | End: 2017-09-23 | Stop reason: HOSPADM

## 2017-09-20 RX ORDER — ASPIRIN 300 MG/1
300 SUPPOSITORY RECTAL DAILY
Status: DISCONTINUED | OUTPATIENT
Start: 2017-09-20 | End: 2017-09-23 | Stop reason: HOSPADM

## 2017-09-20 RX ORDER — OXYCODONE AND ACETAMINOPHEN 10; 325 MG/1; MG/1
1 TABLET ORAL EVERY 8 HOURS PRN
COMMUNITY
End: 2018-05-04 | Stop reason: ALTCHOICE

## 2017-09-20 RX ORDER — LORAZEPAM 2 MG/ML
1 INJECTION INTRAMUSCULAR ONCE
Status: COMPLETED | OUTPATIENT
Start: 2017-09-20 | End: 2017-09-21

## 2017-09-20 RX ORDER — GABAPENTIN 100 MG/1
200 CAPSULE ORAL EVERY 8 HOURS SCHEDULED
Status: DISCONTINUED | OUTPATIENT
Start: 2017-09-20 | End: 2017-09-21

## 2017-09-20 RX ORDER — LABETALOL HYDROCHLORIDE 5 MG/ML
20 INJECTION, SOLUTION INTRAVENOUS
Status: DISCONTINUED | OUTPATIENT
Start: 2017-09-20 | End: 2017-09-22

## 2017-09-20 RX ORDER — ASPIRIN 81 MG/1
81 TABLET, CHEWABLE ORAL DAILY
Status: DISCONTINUED | OUTPATIENT
Start: 2017-09-20 | End: 2017-09-23 | Stop reason: HOSPADM

## 2017-09-20 RX ORDER — ATENOLOL 25 MG/1
25 TABLET ORAL DAILY
Status: DISCONTINUED | OUTPATIENT
Start: 2017-09-20 | End: 2017-09-23

## 2017-09-20 RX ORDER — CLOPIDOGREL BISULFATE 75 MG/1
75 TABLET ORAL DAILY
Status: DISCONTINUED | OUTPATIENT
Start: 2017-09-20 | End: 2017-09-20

## 2017-09-20 RX ORDER — SODIUM CHLORIDE 0.9 % (FLUSH) 0.9 %
1-10 SYRINGE (ML) INJECTION AS NEEDED
Status: DISCONTINUED | OUTPATIENT
Start: 2017-09-20 | End: 2017-09-23 | Stop reason: HOSPADM

## 2017-09-20 RX ORDER — LIDOCAINE 50 MG/G
1 PATCH TOPICAL
Status: DISCONTINUED | OUTPATIENT
Start: 2017-09-20 | End: 2017-09-23 | Stop reason: HOSPADM

## 2017-09-20 RX ORDER — ALPRAZOLAM 0.25 MG/1
0.25 TABLET ORAL 2 TIMES DAILY PRN
Status: DISCONTINUED | OUTPATIENT
Start: 2017-09-20 | End: 2017-09-23 | Stop reason: HOSPADM

## 2017-09-20 RX ORDER — OXYCODONE HYDROCHLORIDE 15 MG/1
15 TABLET, FILM COATED, EXTENDED RELEASE ORAL EVERY 12 HOURS SCHEDULED
COMMUNITY
End: 2017-12-13

## 2017-09-20 RX ORDER — ATORVASTATIN CALCIUM 40 MG/1
80 TABLET, FILM COATED ORAL NIGHTLY
Status: DISCONTINUED | OUTPATIENT
Start: 2017-09-20 | End: 2017-09-23 | Stop reason: HOSPADM

## 2017-09-20 RX ORDER — NICOTINE POLACRILEX 4 MG
15 LOZENGE BUCCAL
Status: DISCONTINUED | OUTPATIENT
Start: 2017-09-20 | End: 2017-09-23 | Stop reason: HOSPADM

## 2017-09-20 RX ORDER — AMITRIPTYLINE HYDROCHLORIDE 50 MG/1
50 TABLET, FILM COATED ORAL NIGHTLY PRN
Status: DISCONTINUED | OUTPATIENT
Start: 2017-09-20 | End: 2017-09-23 | Stop reason: HOSPADM

## 2017-09-20 RX ADMIN — LIDOCAINE 1 PATCH: 50 PATCH CUTANEOUS at 18:15

## 2017-09-20 RX ADMIN — IOPAMIDOL 115 ML: 755 INJECTION, SOLUTION INTRAVENOUS at 11:15

## 2017-09-20 RX ADMIN — HYDROMORPHONE HYDROCHLORIDE 0.5 MG: 1 INJECTION, SOLUTION INTRAMUSCULAR; INTRAVENOUS; SUBCUTANEOUS at 22:53

## 2017-09-20 RX ADMIN — HYDROMORPHONE HYDROCHLORIDE 0.5 MG: 1 INJECTION, SOLUTION INTRAMUSCULAR; INTRAVENOUS; SUBCUTANEOUS at 20:56

## 2017-09-20 RX ADMIN — HYDROMORPHONE HYDROCHLORIDE 0.5 MG: 1 INJECTION, SOLUTION INTRAMUSCULAR; INTRAVENOUS; SUBCUTANEOUS at 18:15

## 2017-09-20 NOTE — THERAPY EVALUATION
Acute Care - Speech Language Pathology Initial Evaluation  [facility]   Cognitive-Communication Evaluation     Patient Name: Romy Arias  : 1945  MRN: [id number]  Today's Date: 2017               Admit Date: 2017     Visit Dx:    ICD-10-CM ICD-9-CM   1. Dysphagia, unspecified R13.10 787.20   2. Cognitive communication deficit R41.841 799.52     Patient Active Problem List   Diagnosis   • Chronic pain   • Dementia   • Polyneuropathy   • Peripheral vascular disease   • Acid reflux   • Chronic osteomyelitis   • Depression   • Diverticulosis   • Emphysema/COPD   • Hypertension   • Diabetes mellitus type 2, uncontrolled, with complications   • CVA (cerebral vascular accident)     Past Medical History:   Diagnosis Date   • Arthritis    • Cancer    • Colon polyps    • Gallstone    • Hypertension    • Rheumatic fever    • Sepsis due to urinary tract infection    • Stroke      Past Surgical History:   Procedure Laterality Date   • BLADDER SURGERY     • GALLBLADDER SURGERY     • HYSTERECTOMY      JUSTEN   • THYROID SURGERY            SLP EVALUATION (last 72 hours)      SLP Evaluation       17 1415                Rehab Evaluation    Document Type evaluation  -RD        Subjective Information no complaints;agree to therapy  -RD        Patient Effort, Rehab Treatment good  -RD        General Information    Patient Profile Review yes  -RD        Onset of Illness/Injury 17  -RD        Subjective Patient Observations alert and cooperative  -RD        Pertinent History Of Current Problem Adm w/ CVA, R wkns. MRI pending. Hx of DM, GERD, dementia. Failed RN dysphagia screen. Per stroke protocol.   -RD        Current Diet Limitations NPO  -RD        Precautions/Limitations, Vision WFL with corrective lenses  -RD        Precautions/Limitations, Hearing WFL  -RD        Prior Level of Function- Communication other (comment)   baseline dementia per chart  -RD        Prior Level of Function- Swallowing safe,  efficient swallowing in all situations  -RD        Plans/Goals Discussed With patient;family;agreed upon  -RD        Barriers to Rehab none identified  -RD        Living Environment    Lives With alone  -RD        Living Arrangements house  -RD        Clinical Impression    SLP Diagnosis Mild dysarthria c/b imprecise articulation. Speech was 90% intelligible in conversation. Mild cognitive-linguistic deficit c/b difficulty w/ immediate memory recall, attention, and high level reasoning/problem solving. Pt reports that these deficits are not at baseline and new since this admission. Expressive and receptive language, reading, and writing were all found to be WFL.   -RD        Functional Level At Time Of Evaluation impaired  -RD        Patient's Goals For Discharge eat/drink without coughing/choking  -RD        Family Goals For Discharge patient able to return to all previous activities/roles  -RD        Criteria for Skilled Therapeutic Interventions Met skilled criteria for cognitive linguistic intervention met;skilled criteria for speech language intervention met  -RD        Rehab Potential good, to achieve stated therapy goals  -RD        Therapy Frequency 5 times/wk  -RD        Predicted Duration of Therapy Intervention (days/wks) until discharge  -RD        Pain Assessment    Pain Assessment No/denies pain  -RD        Cognitive Assessment/Intervention    Current Cognitive/Communication Assessment impaired  -RD        Orientation Status oriented x 4  -RD        Follows Commands/Answers Questions 100% of the time;able to follow multi-step instructions  -RD        Short/Long Term Memory mild impairment, short term memory  -RD        Additional Documentation Cognitive Assessment Intervention (Group)  -RD        Cognitive Assessment Intervention    Behavior/Mood Observations alert;cooperative;distractible  -RD        Attention mild impairment  -RD        Pragmatics WNL/WFL  -RD        Problem Solving mild impairment   -RD        Executive Function Skills mild impairment  -RD        Reasoning mild impairment  -RD        Sequencing mild impairment  -RD        Diffuse Language Characteristics no concerns, diffuse language characteristics  -RD        Communication Assessment/Intervention    Communication Assessment Dysarthria  -RD        Additional Documentation Auditory Comprehen Assess/Intervention (Group);Reading Assessment/Intervention (Group);Verbal Expression Assess/Intervention (Group);Writing Assessment/Intervention (Group);Motor Speech Assessment/Intervention (Group)  -RD        Auditory Comprehen Assess/Intervention    Auditory Comprehension WNL/WFL  -RD        Auditory Comprehen Assess/Intervention    Able to Identify Objects WNL/WFL  -RD        Able to Identify Pictures WNL/WFL  -RD        Answers Yes/No Questions WNL/WFL  -RD        Able to Follow Commands WNL/WFL;success, 4 or more step commands  -RD        Verbal Expression Assess/Intervention    Automatic Speech WNL/WFL  -RD        Speech Repetition WNL/WFL  -RD        Speech Fluency fluent speech  -RD        Conversational Speech WNL/WFL  -RD        Reading Assessment/Intervention    Reading Skills WNL/WFL  -RD        Oral Reading Ability WNL/WFL  -RD        Reading Comprehension WNL/WFL  -RD        Writing Assessment/Intervention    Writing Skills WNL/WFL  -RD        Motor Speech Assess/Intervention    Motor Speech-Apraxia Observations no concerns  -RD        Motor Speech- Apraxia WNL/WFL  -RD        Motor Speech-Dysarthria Observations slurred speech  -RD        Motor Speech- Dysarthria impaired, sentence  -RD        Improve attention    Improve attention by: complete sustained attention task;100%;without cues  -RD        Status: Improve attention New  -RD        Attention Progress continue to address  -RD        Improve memory skills    Improve memory skills through: recalling related word lists immediately;recalling unrelated word lists immediately;repeat  sentence;80%;without cues  -RD        Status: Improve memory skills New  -RD        Memory Skills Progress continue to address  -RD        Improve functional problem solving    Improve functional problem solving through: complete high level reasoning task;80%;without cues  -RD        Status: Improve functional problem solving New  -RD        Functional Problem Solving Progress continue to address  -RD        Improve articulation    Improve articulation: of specific sounds in phrases;of specific sounds in connected speech;by over-articulating at phrase level;by over-articulating in connected speech;90%;without cues  -RD        Status: Improve articulation New  -RD        Articulation Progress continue to address  -RD          User Key  (r) = Recorded By, (t) = Taken By, (c) = Cosigned By    Initials Name Effective Dates    RD Nhi Steinberg, MS CF-SLP 09/18/16 -            EDUCATION  The patient has been educated in the following areas:   Cognitive Impairment Communication Impairment.    SLP Recommendation and Plan  SLP Diagnosis: Mild dysarthria c/b imprecise articulation. Speech was 90% intelligible in conversation. Mild cognitive-linguistic deficit c/b difficulty w/ immediate memory recall, attention, and high level reasoning/problem solving. Pt reports that these deficits are not at baseline and new since this admission. Expressive and receptive language, reading, and writing were all found to be WFL.      Rehab Potential: good, to achieve stated therapy goals  Criteria for Skilled Therapeutic Interventions Met: skilled criteria for cognitive linguistic intervention met, skilled criteria for speech language intervention met        Therapy Frequency: 5 times/wk  Predicted Duration of Therapy Intervention (days/wks): until discharge       Plan of Care Review  Plan Of Care Reviewed With: patient, son  Progress:  (initial BS & S/L eval)  Outcome Summary/Follow up Plan: Clinical dysphagia eval complete. R labial  droop. Trials of thins via tsp and cup, and pureed consistencies given. Overt s/s of aspiration c/b immediate coughing/choking w/ thins via cup and pureed consistencies. Pt not ready for PO given severity of s/s of aspiration. Will f/u tomorrow for repeat BS eval to determine instrumental readiness. RECS: NPO, meds alt route, BS re-eval for instrumental readiness. Cog/comm eval complete. Per stroke protocol. Mild dysarthria c/b imprecise articulation. Speech was 90% intelligible in conversation. Mild cognitive-linguistic deficit c/b difficulty w/ immediate memory recall, attention, and high level reasoning/problem solving. Pt reports that these deficits are not at baseline and new since this admission. Expressive and receptive language, reading, and writing were all found to be WFL.  RECS: Cog/comm tx QD.          IP SLP Goals       09/20/17 1606          Cognitive Linguistic- Optimal Participation in Care    Cognitive Linguistic Optimal Participation in Care- SLP, Date Established 09/20/17  -RD      Cognitive Linguistic Optimal Participation in Care- SLP, Time to Achieve by discharge  -RD      Cognitive Linguistic Optimal Participation in Care- SLP, Date Goal Reviewed 09/20/17  -RD      Cognitive Linguistic Optimal Participation in Care- SLP, Outcome goal ongoing  -RD      Dysarthria- Optimal Particpation in Care    Dysarthria Optimal Participation in Care- SLP, Date Established 09/20/17  -RD      Dysarthria Optimal Participation in Care- SLP, Time to Achieve by discharge  -RD      Dysarthria Optimal Participation in Care- SLP, Date Goal Reviewed 09/20/17  -RD      Dysarthria Optimal Participation in Care- SLP, Outcome goal ongoing  -RD        User Key  (r) = Recorded By, (t) = Taken By, (c) = Cosigned By    Initials Name Provider Type    BEBETO Steinberg, MS CF-SLP Speech and Language Pathologist              Time Calculation:         Time Calculation- SLP       09/20/17 1612          Time Calculation- SLP     SLP Start Time 1415  -RD      SLP Received On 17  -RD        User Key  (r) = Recorded By, (t) = Taken By, (c) = Cosigned By    Initials Name Provider Type    BEBETO Angelesnat GIL Shyannmarilou, MS CF-SLP Speech and Language Pathologist          Therapy Charges for Today     Code Description Service Date Service Provider Modifiers Qty    57988563398 HC ST EVAL ORAL PHARYNG SWALLOW 3 2017 Nhi Steinberg, MS CF-SLP GN 1    78149110177 HC ST EVAL SPEECH AND PROD W LANG  4 2017 Nhi Browndebo, MS CF-SLP GN 1                     Nhi E Maryan, MS CF-SLP  2017 and Acute Care - Speech Language Pathology   Swallow Initial Evaluation [facility]   Clinical Swallow Evaluation     Patient Name: Romy Arias  : 1945  MRN: [id number]  Today's Date: 2017               Admit Date: 2017    Visit Dx:     ICD-10-CM ICD-9-CM   1. Dysphagia, unspecified R13.10 787.20   2. Cognitive communication deficit R41.841 799.52     Patient Active Problem List   Diagnosis   • Chronic pain   • Dementia   • Polyneuropathy   • Peripheral vascular disease   • Acid reflux   • Chronic osteomyelitis   • Depression   • Diverticulosis   • Emphysema/COPD   • Hypertension   • Diabetes mellitus type 2, uncontrolled, with complications   • CVA (cerebral vascular accident)     Past Medical History:   Diagnosis Date   • Arthritis    • Cancer    • Colon polyps    • Gallstone    • Hypertension    • Rheumatic fever    • Sepsis due to urinary tract infection    • Stroke      Past Surgical History:   Procedure Laterality Date   • BLADDER SURGERY     • GALLBLADDER SURGERY     • HYSTERECTOMY      JUSTEN   • THYROID SURGERY            SWALLOW EVALUATION (last 72 hours)      Swallow Evaluation       17 1415                Clinical Impression    SLP Swallowing Diagnosis other (see comments)   r/o pharyngeal dysphagia  -RD        Rehab Potential/Prognosis, Swallowing good, to achieve stated therapy goals  -RD        Criteria for  Skilled Therapeutic Interventions Met skilled criteria for dysphagia intervention met  -RD        Therapy Frequency evaluation only;PRN  -RD        Predicted Duration Therapy Interv (days) until discharge  -RD        SLP Diet Recommendation NPO: unsafe for food/liquid intake  -RD        Recommended Diagnostics reassess via clinical swallow (non-instrumental exam)  -RD        SLP Rec. for Method of Medication Administration meds via alternate route  -RD        Oral Motor Structure and Function    Oral Motor Anatomy and Physiology patient demonstrates anatomy that is WNL  -RD        Dentition Assessment edentulous, does not have dentures  -RD        Secretion Management WNL/WFL  -RD        Mucosal Quality dry  -RD        Volitional Swallow no difficulties initiating volitional swallow  -RD        Volitional Cough weak volitional cough  -RD        Oral Musculature General Assessment oral labial impairment  -RD        Oral Labial Strength and Mobility right labial droop  -RD        General Feeding/Swallowing Observations    Current Feeding Method NPO  -RD        Clinical Swallow Exam    Mode of Presentation fed by clinician;self fed;spoon;cup;straw  -RD        Oral Preparation Concerns other (see comments)   DNT solid  -RD        Oral Phase Results impaired oral phase, signs of dysfunction present  -RD        Pharyngeal Phase Results sign/symptoms of pharyngeal impairment;cough;throat clear  -RD        Summary of Clinical Exam Clinical dysphagia eval complete. R labial droop. Trials of thins via tsp and cup, and pureed consistencies given. Overt s/s of aspiration c/b immediate coughing/choking w/ thins via cup and pureed consistencies. Pt not ready for PO given severity of s/s of aspiration. Will f/u tomorrow for repeat BS eval to determine instrumental readiness. RECS: NPO, meds alt route, BS re-eval for instrumental readiness.   -RD        Swallow Recommendations    Oral Care oral care with toothbrush and dentifrice  BID and PRN  -RD        Other Recommendations repeat clincial exam  -RD        Recommended Diet NPO: unsafe for food/liquid intake  -RD          User Key  (r) = Recorded By, (t) = Taken By, (c) = Cosigned By    Initials Name Effective Dates    RD hNi Steinberg, MS CF-SLP 09/18/16 -         EDUCATION  The patient has been educated in the following areas:   Dysphagia (Swallowing Impairment) Oral Care/Hydration NPO rationale.    SLP Recommendation and Plan  SLP Swallowing Diagnosis: other (see comments) (r/o pharyngeal dysphagia)  SLP Diet Recommendation: NPO: unsafe for food/liquid intake     SLP Rec. for Method of Medication Administration: meds via alternate route     Recommended Diagnostics: reassess via clinical swallow (non-instrumental exam)  Criteria for Skilled Therapeutic Interventions Met: skilled criteria for dysphagia intervention met     Rehab Potential/Prognosis, Swallowing: good, to achieve stated therapy goals  Therapy Frequency: evaluation only, PRN  Predicted Duration of Therapy Intervention (days/wks): until discharge          Plan of Care Review  Plan Of Care Reviewed With: patient, son  Progress:  (initial BS & S/L eval)  Outcome Summary/Follow up Plan: Clinical dysphagia eval complete. R labial droop. Trials of thins via tsp and cup, and pureed consistencies given. Overt s/s of aspiration c/b immediate coughing/choking w/ thins via cup and pureed consistencies. Pt not ready for PO given severity of s/s of aspiration. Will f/u tomorrow for repeat BS eval to determine instrumental readiness. RECS: NPO, meds alt route, BS re-eval for instrumental readiness. Cog/comm eval complete. Per stroke protocol. Mild dysarthria c/b imprecise articulation. Speech was 90% intelligible in conversation. Mild cognitive-linguistic deficit c/b difficulty w/ immediate memory recall, attention, and high level reasoning/problem solving. Pt reports that these deficits are not at baseline and new since this admission.  Expressive and receptive language, reading, and writing were all found to be WFL.  RECS: Cog/comm tx QD.          IP SLP Goals       09/20/17 1606          Cognitive Linguistic- Optimal Participation in Care    Cognitive Linguistic Optimal Participation in Care- SLP, Date Established 09/20/17  -RD      Cognitive Linguistic Optimal Participation in Care- SLP, Time to Achieve by discharge  -RD      Cognitive Linguistic Optimal Participation in Care- SLP, Date Goal Reviewed 09/20/17  -RD      Cognitive Linguistic Optimal Participation in Care- SLP, Outcome goal ongoing  -RD      Dysarthria- Optimal Particpation in Care    Dysarthria Optimal Participation in Care- SLP, Date Established 09/20/17  -RD      Dysarthria Optimal Participation in Care- SLP, Time to Achieve by discharge  -RD      Dysarthria Optimal Participation in Care- SLP, Date Goal Reviewed 09/20/17  -RD      Dysarthria Optimal Participation in Care- SLP, Outcome goal ongoing  -RD        User Key  (r) = Recorded By, (t) = Taken By, (c) = Cosigned By    Initials Name Provider Type    BEBETO Steinberg MS CF-SLP Speech and Language Pathologist               Time Calculation:         Time Calculation- SLP       09/20/17 1612          Time Calculation- SLP    SLP Start Time 1415  -RD      SLP Received On 09/20/17  -RD        User Key  (r) = Recorded By, (t) = Taken By, (c) = Cosigned By    Initials Name Provider Type    BEBETO Steinberg MS CF-SLP Speech and Language Pathologist          Therapy Charges for Today     Code Description Service Date Service Provider Modifiers Qty    64741060741 HC ST EVAL ORAL PHARYNG SWALLOW 3 9/20/2017 Nhi Steinberg MS CF-SLP GN 1    [id number] HC ST EVAL SPEECH AND PROD W LANG  4 9/20/2017 Nhi Steinberg MS CF-SLP GN 1               Nhi Steinberg MS CF-SLP  9/20/2017

## 2017-09-20 NOTE — H&P
HOSPITAL MEDICINE HISTORY AND PHYSICAL    PCP: Marina Bloom MD  Specialists:    Chief Complaint: right sided weakness     Subjective     History of Present Illness  70 yo WF with hx of Neuropathy who awakened this am at 6 am with sudden onset of RLE and RUE weakness as well as dysarthria.  She was transfer from [facility] where she was found to be in NSR and CT head was found to be negative.  She was transferred to [facility] for workup.  She has pmx of chronic neuropathic pain as well as IDDM and HTN.  She currently is lying in bed very anxious. Complaints that she can't tolerate MRI.  Denies pain. C/o right lower ext is feeling very heavy.  Denies cp or sob.  Denies n/v/d. Pt focused on her pain meds that she is on typically.  Says she can't tolerate any workup without them.   Review of Systems   Otherwise complete ROS is negative except as mentioned in the HPI.    Past Medical History:   Past Medical History:   Diagnosis Date   • Arthritis    • Cancer    • Colon polyps    • Gallstone    • Hypertension    • Rheumatic fever    • Sepsis due to urinary tract infection    • Stroke        Past Surgical History:  Past Surgical History:   Procedure Laterality Date   • BLADDER SURGERY     • GALLBLADDER SURGERY     • HYSTERECTOMY      JUSTEN   • THYROID SURGERY         Family History: family history includes Arthritis in her other; Diabetes in her other; Heart attack in her other; Hyperlipidemia in her other; Hypertension in her other; Kidney disease in her other; Lung cancer in her other; Osteoporosis in her other.     Social History:  reports that she quit smoking about 20 years ago. She does not have any smokeless tobacco history on file. She reports that she does not drink alcohol or use illicit drugs.    Medications:  Prescriptions Prior to Admission   Medication Sig Dispense Refill Last Dose   • ALPRAZolam (XANAX) 0.25 MG tablet Take  by mouth.      • amitriptyline (ELAVIL) 75 MG tablet Take  by  "mouth.      • atenolol (TENORMIN) 50 MG tablet Take  by mouth daily.      • Cholecalciferol (VITAMIN D) 2000 UNITS capsule Take  by mouth.      • docusate sodium (DULCOLAX) 100 MG capsule Take  by mouth 3 (three) times a day.      • gabapentin (NEURONTIN) 400 MG capsule 900 mg 3 (Three) Times a Day.  0 Taking   • insulin aspart (NOVOLOG FLEXPEN) 100 UNIT/ML solution pen-injector sc pen Inject 35 Units under the skin 3 (Three) Times a Day Before Meals. 10 pen 6    • Insulin Glargine (TOUJEO SOLOSTAR) 300 UNIT/ML solution pen-injector Inject 80 Units under the skin Daily. 10 pen 6    • Insulin Pen Needle (BD PEN NEEDLE SAUL U/F) 32G X 4 MM misc 1 each 5 (Five) Times a Day. 200 each 6    • Insulin Syringe-Needle U-100 (BD INSULIN SYRINGE ULTRAFINE) 31G X 5/16\" 1 ML misc 1 each 3 (Three) Times a Day. 100 each 3    • lidocaine (LIDODERM) 5 % Apply  topically.      • lidocaine (XYLOCAINE) 5 % ointment Apply  topically.      • lisinopril-hydrochlorothiazide (PRINZIDE,ZESTORETIC) 20-12.5 MG per tablet Take  by mouth daily.      • metFORMIN (GLUCOPHAGE) 500 MG tablet Take 1 tablet by mouth Daily. 90 tablet 0    • Multiple Vitamin (MULTI-VITAMINS PO) Take  by mouth daily.      • mupirocin (BACTROBAN) 2 % ointment   0 Taking   • Mupirocin 2 % kit Apply  topically 2 (two) times a day.      • omeprazole OTC (PRILOSEC OTC) 20 MG EC tablet Take  by mouth daily.      • ondansetron (ZOFRAN) 8 MG tablet Take  by mouth.      • vitamin B-12 (CYANOCOBALAMIN) 100 MCG tablet Take  by mouth daily.        Allergies   Allergen Reactions   • Demerol [Meperidine]    • Dilantin [Phenytoin Sodium Extended]    • Lyrica [Pregabalin]    • Sulfa Antibiotics    • Tegretol [Carbamazepine]    • Tetracyclines & Related    • Ultram [Tramadol]    • Vioxx [Rofecoxib]        Objective    Physical Exam:   Vital Signs: /63  Pulse 63  Temp 97.7 °F (36.5 °C) (Oral)   Ht 68\" (172.7 cm)  Wt 215 lb (97.5 kg)  BMI 32.69 kg/m2  Physical " Exam  Constitutional: anxious, awake, alert, slurred speech   Eyes: PERRLA, sclerae anicteric, no conjunctival injection  HENT: NCAT, mucous membranes dry, slight left facial droop   Neck: Supple, no thyromegaly, no lymphadenopathy, trachea midline  Respiratory: Clear to auscultation bilaterally, nonlabored respirations   Cardiovascular: RRR, no murmurs, rubs, or gallops, palpable pedal pulses bilaterally  Gastrointestinal: Positive bowel sounds, soft, nontender, nondistended  Musculoskeletal: No bilateral ankle edema, no clubbing or cyanosis to bilateral lower extremities.   Psychiatric: Oriented x 3, appropriate affect, cooperative  Neurologic: slow slurred speech, rle and rue weakness 2/5, left side 5/5. Mild left facial droop   Skin: No rashes      Results Reviewed:  I have personally reviewed current lab, radiology, and data and agree.    Results from last 7 days  Lab Units 09/20/17  1205   WBC 10*3/mm3 8.05   HEMOGLOBIN g/dL 12.9   PLATELETS 10*3/mm3 261       Results from last 7 days  Lab Units 09/20/17  1205   SODIUM mmol/L 133   POTASSIUM mmol/L 4.3   CO2 mmol/L 33.0*   CREATININE mg/dL 1.10   GLUCOSE mg/dL 276*   CALCIUM mg/dL 8.5*      Lab Results   Component Value Date    GLUCOSE 276 (H) 09/20/2017    CALCIUM 8.5 (L) 09/20/2017     09/20/2017    K 4.3 09/20/2017    CO2 33.0 (H) 09/20/2017    CL 99 09/20/2017    BUN 20 09/20/2017    CREATININE 1.10 09/20/2017    EGFRIFNONA 49 (L) 09/20/2017    BCR 18.2 09/20/2017    ANIONGAP 1.0 (L) 09/20/2017             Assessment/Plan   Assessment & Plan  Active Problems:    CVA (cerebral vascular accident)    Diabetes mellitus type 2, uncontrolled, with complications    Chronic pain    Dementia    Polyneuropathy    Peripheral vascular disease    Acid reflux    Chronic osteomyelitis    Depression    Emphysema/COPD    Hypertension        Plan:  Admit to Tele, stroke protocol initiated  NS following, MRI pending all other work up negative thus far. Order ECHO as  well,  mg rectal at OSH , Statin ordered when she is swallowing  CT PERFUSION-Negative ,CTA HEAD/NECK- negative , CT HEAD- negative   NIH 4   Chronic pain meds- oxycontin 15 bid and oxycodone 10mg tid prn , neurontin 900mg tid , will lower doses as pt and bp tolerates. Likely will be difficult to actually control pain .  IDDM-will continue ssi, check HgA1c  Continue Her home meds to keep BP <220.   Dementia/Depression  COPD- nebs  I discussed the patients findings and my recommendations with pt and nursing     I believe this patient meets observation      Farhana Merlos,    09/20/17   1:18 PM

## 2017-09-20 NOTE — CONSULTS
NAME: PAUL ULRICH  : 1945  PCP: Marina Bloom MD  Attending MD: Farhana Merlos DO    Date of Admission:  2017  Date of Service: 2017     CC: Neurological Complaint right sided weakness    History of Present Illness:  71 y.o.  female who was transferred from [facility] for evaluation of a possible CVA.  She is a wake-up at 0600 this morning whose LKW was 2100  when she went to bed.  She went to bed with nausea.  She has a PMH of chronic neuropathic pain as well as IDDM and HTN.  When she arrived she was taken to CT for Perfusion and CT angiogram head and neck.  She is able to walk with assist but has noticeable weakness on the right side with ambulation.  She states her leg feels very heavy.      Past Medical History:  Past Medical History:   Diagnosis Date   • Arthritis    • Cancer    • Colon polyps    • Gallstone    • Hypertension    • Rheumatic fever    • Sepsis due to urinary tract infection    • Stroke        Past Surgical History:  Past Surgical History:   Procedure Laterality Date   • BLADDER SURGERY     • GALLBLADDER SURGERY     • HYSTERECTOMY      JUSTEN   • THYROID SURGERY           Medications  Prescriptions Prior to Admission   Medication Sig Dispense Refill Last Dose   • ALPRAZolam (XANAX) 0.25 MG tablet Take  by mouth.      • amitriptyline (ELAVIL) 75 MG tablet Take  by mouth.      • atenolol (TENORMIN) 50 MG tablet Take  by mouth daily.      • Cholecalciferol (VITAMIN D) 2000 UNITS capsule Take  by mouth.      • docusate sodium (DULCOLAX) 100 MG capsule Take  by mouth 3 (three) times a day.      • gabapentin (NEURONTIN) 400 MG capsule 900 mg 3 (Three) Times a Day.  0 Taking   • insulin aspart (NOVOLOG FLEXPEN) 100 UNIT/ML solution pen-injector sc pen Inject 35 Units under the skin 3 (Three) Times a Day Before Meals. 10 pen 6    • Insulin Glargine (TOUJEO SOLOSTAR) 300 UNIT/ML solution pen-injector Inject 80 Units under the skin Daily. 10 pen 6    •  "Insulin Pen Needle (BD PEN NEEDLE SAUL U/F) 32G X 4 MM misc 1 each 5 (Five) Times a Day. 200 each 6    • Insulin Syringe-Needle U-100 (BD INSULIN SYRINGE ULTRAFINE) 31G X 5/16\" 1 ML misc 1 each 3 (Three) Times a Day. 100 each 3    • lidocaine (LIDODERM) 5 % Apply  topically.      • lidocaine (XYLOCAINE) 5 % ointment Apply  topically.      • lisinopril-hydrochlorothiazide (PRINZIDE,ZESTORETIC) 20-12.5 MG per tablet Take  by mouth daily.      • metFORMIN (GLUCOPHAGE) 500 MG tablet Take 1 tablet by mouth Daily. 90 tablet 0    • Multiple Vitamin (MULTI-VITAMINS PO) Take  by mouth daily.      • mupirocin (BACTROBAN) 2 % ointment   0 Taking   • Mupirocin 2 % kit Apply  topically 2 (two) times a day.      • omeprazole OTC (PRILOSEC OTC) 20 MG EC tablet Take  by mouth daily.      • ondansetron (ZOFRAN) 8 MG tablet Take  by mouth.      • vitamin B-12 (CYANOCOBALAMIN) 100 MCG tablet Take  by mouth daily.          Allergies:  Allergies   Allergen Reactions   • Demerol [Meperidine]    • Dilantin [Phenytoin Sodium Extended]    • Lyrica [Pregabalin]    • Sulfa Antibiotics    • Tegretol [Carbamazepine]    • Tetracyclines & Related    • Ultram [Tramadol]    • Vioxx [Rofecoxib]        Social Hx:  Social History     Social History   • Marital status:      Spouse name: N/A   • Number of children: N/A   • Years of education: N/A     Occupational History   • Not on file.     Social History Main Topics   • Smoking status: Former Smoker     Quit date: 1/20/1997   • Smokeless tobacco: Not on file   • Alcohol use No   • Drug use: No   • Sexual activity: Not on file     Other Topics Concern   • Not on file     Social History Narrative       Family Hx:  Family History   Problem Relation Age of Onset   • Arthritis Other    • Hypertension Other    • Osteoporosis Other    • Heart attack Other      acute   • Diabetes Other    • Hyperlipidemia Other    • Lung cancer Other    • Kidney disease Other        Review of Imaging:  Ct Angiogram " Head With & Without Contrast    Result Date: 9/20/2017  EXAMINATION: CT ANGIOGRAM HEAD W WO CONTRAST-, CT ANGIOGRAM NECK W WO CONTRAST- 09/20/2017  INDICATION: CVA  TECHNIQUE: CT angiogram of the head and neck with and without intravenous contrast administration.  The radiation dose reduction device was turned on for each scan per the ALARA (As Low as Reasonably Achievable) protocol.  COMPARISON: NONE  FINDINGS:  NECK: Normal 3 vessel arch with patent great vessel origins. Right dominant vertebral artery system demonstrating patency without hemodynamically significant stenosis, aneurysm or occlusion. Bilateral cervical carotid systems demonstrate severely retropharyngeal course in the midportion. Minimal atherosclerotic involvement of the bilateral carotid bulbs with approximately 15% right and 10% left luminal narrowing.  Measurements performed utilizing NASCET criteria.  Head: Distal internal carotid arteries are patent without hemodynamically significant stenosis, aneurysm or occlusion. [place] and branch vessels including anterior, middle and posterior cerebral arteries patent without hemodynamically significant stenosis, aneurysm or occlusion. Fetal origin of the right PCA noted. Vertebrobasilar system patent without hemodynamically significant stenosis, aneurysm or occlusion. Tortuosity and high branching pattern of the vertebral or persistent fetal origins.      CTA of the head and neck without hemodynamically significant stenosis, aneurysm or occlusion.  D:  09/20/2017 E:  09/20/2017  This report was finalized on 9/20/2017 12:43 PM by Dr. Bryce Willis.      Ct Angiogram Neck With & Without Contrast    Result Date: 9/20/2017  EXAMINATION: CT ANGIOGRAM HEAD W WO CONTRAST-, CT ANGIOGRAM NECK W WO CONTRAST- 09/20/2017  INDICATION: CVA  TECHNIQUE: CT angiogram of the head and neck with and without intravenous contrast administration.  The radiation dose reduction device was turned on for each scan per  the ALARA (As Low as Reasonably Achievable) protocol.  COMPARISON: NONE  FINDINGS:  NECK: Normal 3 vessel arch with patent great vessel origins. Right dominant vertebral artery system demonstrating patency without hemodynamically significant stenosis, aneurysm or occlusion. Bilateral cervical carotid systems demonstrate severely retropharyngeal course in the midportion. Minimal atherosclerotic involvement of the bilateral carotid bulbs with approximately 15% right and 10% left luminal narrowing.  Measurements performed utilizing NASCET criteria.  Head: Distal internal carotid arteries are patent without hemodynamically significant stenosis, aneurysm or occlusion. [place] and branch vessels including anterior, middle and posterior cerebral arteries patent without hemodynamically significant stenosis, aneurysm or occlusion. Fetal origin of the right PCA noted. Vertebrobasilar system patent without hemodynamically significant stenosis, aneurysm or occlusion. Tortuosity and high branching pattern of the vertebral or persistent fetal origins.      CTA of the head and neck without hemodynamically significant stenosis, aneurysm or occlusion.  D:  09/20/2017 E:  09/20/2017  This report was finalized on 9/20/2017 12:43 PM by Dr. Bryce Willis.      Ct Cerebral Perfusion With & Without Contrast    Result Date: 9/20/2017  EXAMINATION: CT CEREBRAL PERFUSION WITH AND WITHOUT CONTRAST-09/20/2017:   INDICATION: CVA.  TECHNIQUE: CT cerebral perfusion with and without intravenous contrast administration. Multiple parametric maps including mean transit time, time to drain, cerebral blood flow and cerebral blood volume were performed.  The radiation dose reduction device was turned on for each scan per the ALARA (As Low as Reasonably Achievable) protocol.  COMPARISON: CT head 03/12/2014.  FINDINGS: No focal defect in parametric maps of mean transit time or time to drain. Cerebral blood flow and cerebral blood volume preserved  without focal defect to suggest acute ischemia involving a specific vascular territory or ischemic penumbra.      Normal CT cerebral perfusion without evidence for focal defect to suggest acute ischemia of specific vascular territory.  D:  09/20/2017 E:  09/20/2017    This report was finalized on 9/20/2017 12:43 PM by Dr. Bryce Willis.      These images were discussed with Dr. Childress.     Laboratory Result:  Lab Results   Component Value Date    WBC 8.05 09/20/2017    HGB 12.9 09/20/2017    HCT 41.9 09/20/2017    MCV 86.6 09/20/2017     09/20/2017     Lab Results   Component Value Date    GLUCOSE 276 (H) 09/20/2017    CALCIUM 8.5 (L) 09/20/2017     09/20/2017    K 4.3 09/20/2017    CO2 33.0 (H) 09/20/2017    CL 99 09/20/2017    BUN 20 09/20/2017    CREATININE 1.10 09/20/2017    EGFRIFNONA 49 (L) 09/20/2017    BCR 18.2 09/20/2017    ANIONGAP 1.0 (L) 09/20/2017     Lab Results   Component Value Date    HGBA1C 10.6 05/22/2017     No results found for: CHOL  No results found for: TRIG  No results found for: HDL  No results found for: LDLCALC  No results found for: LDL  No results found for: HDLLDLRATIO  No components found for: CHOLHDL    Physical Examination:  Vitals:    09/20/17 1345   BP: 161/71   Pulse: 68   Resp:    Temp:    SpO2: 94%        General Appearance:   Well developed, well nourished, well groomed, alert, and cooperative.  Cardiovascular: Regular rate and rhythm. No carotid bruits    Neurological examination:  Interval: baseline  1a. Level Of Consciousness: 0-->Alert: keenly responsive  1b. LOC Questions: 0-->Answers both questions correctly  1c. LOC Commands: 0-->Performs both tasks correctly  2. Best Gaze: 0-->Normal  3. Visual: 0-->No visual loss  4. Facial Palsy: 1-->Minor paralysis (flattened nasolabial fold, asymmetry on smiling) (left sided)  5a. Motor Arm, Left: 0-->No drift: limb holds 90 (or 45) degrees for full 10 secs  5b. Motor Arm, Right: 0-->No drift: limb holds 90 (or 45)  degrees for full 10 secs  6a. Motor Leg, Left: 0-->No drift: leg holds 30 degree position for full 5 secs  6b. Motor Leg, Right: 0-->No drift: leg holds 30 degree position for full 5 secs  7. Limb Ataxia: 1-->Present in one limb (right arm slow )  8. Sensory: 1-->Mild-to-moderate sensory loss: patient feels pinprick is less sharp or is dull on the affected side: or there is a loss of superficial pain with pinprick, but patient is aware of being touched (right sided less sensation)  9. Best Language: 0-->No aphasia: normal  10. Dysarthria: 1-->Mild-to-moderate dysarthria: patient slurs at least some words and, at worst, can be understood with some difficulty  11. Extinction and Inattention (formerly Neglect): 0-->No abnormality    Total (NIH Stroke Scale): 4      Diagnoses/Plan:    Ms. Arias is a 71 y.o. female who is here for further work up of neurological complaints.  She is not a candidate for neurointerventional services at this time.  This was discussed with Dr. Briggs as well as Dr. Merlos.  The stroke order set will be utilized for possible cause and we will continue to follow as needed.  I was told her NIH was an 8 at the [facility] and it has improved to a 4 upon arrival here.  She was also given 325 ASA rectally at the [facility] so we can start that tomorrow.  I have communicated all of this information to the Neurologist and Dr. Merlos.

## 2017-09-20 NOTE — NURSING NOTE
ACC REVIEW REPORT: [facility]        PATIENT NAME: Romy Arias    PATIENT ID: [id number]    BED: S221    BED TYPE: Tele    BED GIVEN TO: Nevaeh Hernadez RN    TIME BED GIVEN: 0955    YOB: 1945    AGE: 70 yo    GENDER: Female    PREVIOUS ADMIT TO [facility]:     PREVIOUS ADMISSION DATE:     PATIENT CLASS:     TODAY'S DATE: 9/20/2017    TRANSFER DATE: 9/20/2017    ETA: 1055    TRANSFERRING FACILITY: [facility]    TRANSFERRING FACILITY PHONE # : [phone]    TRANSFERRING MD:     DATE/TIME REQUEST RECEIVED: 9/20/2017 at 0924    [facility] RN: Shelly Mcadams RN     REPORT FROM: Nevaeh Hernadez    TIME REPORT TAKEN: 0945    DIAGNOSIS: CVA    REASON FOR TRANSFER TO [facility]: Higher Level of Care    TRANSPORTATION: Ambulance    CLINICAL REASON FOR TRANSFER TO [facility]: Patient reports that at about 0600 today she developed  Upper and lower right sided weakness and dysarthria. Upon arrival to [facility] ED she continued to have right sided weakness with rt drift and facial droop and continued dysarthria.       CLINICAL INFORMATION    HEIGHT:     WEIGHT:     ALLERGIES: Tegretol and Ultram    TOVAR:     INFECTIOUS DISEASE: MRSA of her left toe    ISOLATION:     1ST VITAL SIGNS:   TIME:   TEMP:   PULSE:   B/P:   RESP:     LAST VITAL SIGNS:  TIME:   TEMP: Afebrile  PULSE: 68  B/P: 151/61  RESP: 25    LAB INFORMATION: See Labs sent with patient    CULTURE INFORMATION:     MEDS/IV FLUIDS: See MAR sent with patient. Has a #18 LAC-SL. Received 300 mg rectal Tylenol.       CARDIAC SYSTEM:    CHEST PAIN: None    RATE:     SCALE:     RHYTHM: NSR    Is patient taking or has patient been given any drugs that could increase bleeding? None  (Plavix, Brilinta, Effient, Eliquis, Xarelto, Warfarin, Integrilin, Angiomax)    DRUG:      DOSE/FREQUENCY:     CARDIAC ENZYMES:    DATE:   TIME:   CK:   CKMB:   JULIO:   TROP:     DATE:   TIME:   CK:   CKMB:   JULIO:   TROP:       HEART CATH:     HEART CATH DATE:     HEART CATH RESULTS:     LAD:   RCA:   CX:    LMAIN:   EF:     SWAN:     SITE:   SIZE:    DATE INSERTED:     ARTLINE:     SITE:   SIZE:   DATE INSERTED:     SHEATH:    SITE:   SIZE:   DATE INSERTED:         VASOSEAL:    SITE:   DATE INSERTED:     EXTERNAL PACEMAKER:     RATE:   EXT PACER ON:     MODE:    DATE INSERTED:   OUTPUT SETTING:   SENSITIVITY SETTING:   SENSITIVITY TYPE:     IABP:    SITE:   AUG PRESSURE:   DATE INSERTED:     CARDIAC NOTES:       RESPIRATORY SYSTEM:    LUNG SOUNDS:    CLEAR: Yes  CRACKLES:   WHEEZES:   RHONCHI:   DIMINISHED:     ABG DATE:         ABG TIME:     ABG RESULTS:    PH:   PO2:   PCO2:   HCO3:   O2 SAT:       ETT:     ETT SIZE:     ORAL:     NASAL:     SECURED AT MEASUREMENT (CM):     ON VENTILATOR:    TV:   FI02:   RATE:   PEEP:     OXYGEN: RA    O2 SAT: 97%    ADMINISTRATION ROUTE:     IMAGING FINDINGS:     PNEUMO LOCATION:     PNEUMO SIZE:     PNEUMO CHEST TUBE SEAL TYPE:     RADIOLOGY RESULTS:     RESPIRATORY STATUS:       CNS/MUSCULOSKELETAL    ALERT AND ORIENTED:    PERSON: Yes  PLACE: Yes  TIME: Yes    INJURY:  WHERE:     DARLEEN COMA SCALE:    E:   M:   V:     STROKE SCALE: NIH-8    SIZE OF HEMORRHAGE:     SYMPTOMS: (CHOOSE APPROPRIATE)    ASPHASIA:   ATAXIA: Yes  DYSARTHRIA:Yes  DYSPHASIA:   HEADACHE:   PARALYSIS:   SEIZURE:   SYNCOPE:   VERTIGO:   VISION CHANGE:        EXTREMITY WEAKNESS:    LEFT ARM:   RIGHT ARM: Yes  LEFT LEG:   RIGHT LEG: Yes    CAT SCAN RESULTS: CT of Head negative for acute bleed and/or ischemia    MRI RESULTS:     CNS/MUSCULOSKELETAL NOTES: Patient follows commands and answer questions appropriately.      GI//GY      ABDOMINAL PAIN:     VOMITING:     DIARRHEA:     NAUSEA:     BOWEL SOUNDS:     OCCULT STOOL:     VAGINAL BLEEDING:     TESTICULAR PAIN:     HEMATURIA:     NG TUBE:    SIZE:   DATE INSERTED:       ULTRASOUND:     ULTRASOUND RESULTS:       ACUTE ABDOMEN:     ACUTE ABDOMEN RESULTS:       CT SCAN:     CT SCAN RESULTS:       GI//GY NOTES:     PAST MEDICAL HISTORY: DM, HTN,  Choley,Hernia repair, Appy, Hyst, Thyroidectomy    OTHER SYMPTOM NOTES:     ADDITIONAL NOTES:           Shelly Mcadams RN  9/20/2017  10:44 AM

## 2017-09-20 NOTE — CONSULTS
Neurology    Referring Provider: JJ Taveras    Reason for Consultation: stroke      Chief complaint: Right hemiparesis and dysarthria    Subjective .     History of present illness:  Ms. Arias is a 71-year-old female with a past medical history significant for hypertension and diabetes mellitus who is admitted to the hospitalist service for sudden onset right hemiparesis and slurred speech.  She states that she was last known well around 9 PM when she went to bed last night.  Upon awaking at 6 AM today she noticed her symptoms.  She denies any visual disturbance or chest discomfort.  With regards to her diabetes she states that it is uncontrolled with a previous hemoglobin A1c 9.1 checked at her PCPs office.  This is down from 10.6 last recorded here 05/22/2017.      Review of Systems: Positive for weakness and slurred speech.Otherwise complete review of systems was discussed with the patient and found to be negative except for that mentioned in history of present illness or in the initial H&P dated 09/20/2017    History  Past Medical History:   Diagnosis Date   • Arthritis    • Cancer    • Colon polyps    • Gallstone    • Hypertension    • Rheumatic fever    • Sepsis due to urinary tract infection    • Stroke    ,   Past Surgical History:   Procedure Laterality Date   • BLADDER SURGERY     • GALLBLADDER SURGERY     • HYSTERECTOMY      JUSTEN   • THYROID SURGERY     ,   Family History   Problem Relation Age of Onset   • Arthritis Other    • Hypertension Other    • Osteoporosis Other    • Heart attack Other      acute   • Diabetes Other    • Hyperlipidemia Other    • Lung cancer Other    • Kidney disease Other    ,   Social History   Substance Use Topics   • Smoking status: Former Smoker     Quit date: 1/20/1997   • Smokeless tobacco: Not on file   • Alcohol use No   ,   Prescriptions Prior to Admission   Medication Sig Dispense Refill Last Dose   • ALPRAZolam (XANAX) 0.25 MG tablet Take  by mouth.      •  "amitriptyline (ELAVIL) 75 MG tablet Take  by mouth.      • atenolol (TENORMIN) 50 MG tablet Take  by mouth daily.      • Cholecalciferol (VITAMIN D) 2000 UNITS capsule Take  by mouth.      • docusate sodium (DULCOLAX) 100 MG capsule Take  by mouth 3 (three) times a day.      • gabapentin (NEURONTIN) 400 MG capsule 900 mg 3 (Three) Times a Day.  0 Taking   • insulin aspart (NOVOLOG FLEXPEN) 100 UNIT/ML solution pen-injector sc pen Inject 35 Units under the skin 3 (Three) Times a Day Before Meals. 10 pen 6    • Insulin Glargine (TOUJEO SOLOSTAR) 300 UNIT/ML solution pen-injector Inject 80 Units under the skin Daily. 10 pen 6    • Insulin Pen Needle (BD PEN NEEDLE SAUL U/F) 32G X 4 MM misc 1 each 5 (Five) Times a Day. 200 each 6    • Insulin Syringe-Needle U-100 (BD INSULIN SYRINGE ULTRAFINE) 31G X 5/16\" 1 ML misc 1 each 3 (Three) Times a Day. 100 each 3    • lidocaine (LIDODERM) 5 % Apply  topically.      • lidocaine (XYLOCAINE) 5 % ointment Apply  topically.      • lisinopril-hydrochlorothiazide (PRINZIDE,ZESTORETIC) 20-12.5 MG per tablet Take  by mouth daily.      • metFORMIN (GLUCOPHAGE) 500 MG tablet Take 1 tablet by mouth Daily. 90 tablet 0    • Multiple Vitamin (MULTI-VITAMINS PO) Take  by mouth daily.      • mupirocin (BACTROBAN) 2 % ointment   0 Taking   • Mupirocin 2 % kit Apply  topically 2 (two) times a day.      • omeprazole OTC (PRILOSEC OTC) 20 MG EC tablet Take  by mouth daily.      • ondansetron (ZOFRAN) 8 MG tablet Take  by mouth.      • vitamin B-12 (CYANOCOBALAMIN) 100 MCG tablet Take  by mouth daily.       and Allergies:  Demerol [meperidine]; Dilantin [phenytoin sodium extended]; Lyrica [pregabalin]; Sulfa antibiotics; Tegretol [carbamazepine]; Tetracyclines & related; Ultram [tramadol]; and Vioxx [rofecoxib]    Objective     Vital Signs   Blood pressure 161/71, pulse 68, temperature 97.7 °F (36.5 °C), temperature source Oral, resp. rate 16, height 68\" (172.7 cm), weight 215 lb (97.5 kg), SpO2 94 " %.    Physical Exam:      Gen: Lying in bed with eyes open. In NAD. Appears stated age   Eyes: PERRL, conjuntivae/lids normal   ENT: External canals normal bilaterally. Edentulous   Neck: Supple. No thyroid enlargement noted   Respiratory: CTA bilaterally. Respirations unlabored   CV: RRR, S1 and S2 nml. Radial pulses 2+ bilaterally.    Skin: No rashes noted on exposed skin. Normal tugor.   MSK: Normal bulk and tone. Nml ROM     Neurologic:   Mental status: Awake, alert, oriented x4. Follows commands.  Speech moderately dysarthric.    CN: PERRL, EOM intact, sensation intact in upper/mid/lower face bilaterally, right lower facial droop, hearing intact to finger rub bilaterally, palate elevates symmetrically, tongue movements and SCMs strong bilaterally    Motor: Full strength noted in the left arm and leg throughout.  There is mild weakness noted in the right lower extremity with subjective heaviness, but she does have antigravity movement.  Moderate weakness in the right upper extremity with some antigravity movement in drift.   Reflexes: 2+ throughout   Sensory: Intact to LT throughout   Coordination: No dysmetria noted   Gait: Not tested        Results Reviewed:     Labs reviewed  CT perfusion personally reviewed.  No acute process seen.  Agree with report  CTA head and neck personally reviewed.  No significant stenosis or occlusion seen.  Agree with the report                 Assessment/Plan     1.  Acute ischemic stroke = likely lacunar infarct (likely internal capsule vs emerson) given the CT perfusion and CTA reports.  She denies taking any antiplatelets at home.  On aspirin and atorvastatin here.  Recommend MRI brain.  The patient states that she is severely claustrophobic, so we will try to premed with Ativan 1 mg prior to going for MRI.  TTE pending.  Rehab eval.  Lipid profile in AM    2.  Hypertension = continue meds    3.  Type 2 diabetes mellitus =  uncontrolled according to the patient.  A1c in AM.   Continue meds          Analy Briggs MD  09/20/17  2:46 PM

## 2017-09-20 NOTE — PLAN OF CARE
Problem: Patient Care Overview (Adult)  Goal: Plan of Care Review  Outcome: Ongoing (interventions implemented as appropriate)    09/20/17 1606   Coping/Psychosocial Response Interventions   Plan Of Care Reviewed With patient;son   Patient Care Overview   Progress (initial BS & S/L eval)   Outcome Evaluation   Outcome Summary/Follow up Plan Clinical dysphagia eval complete. R labial droop. Trials of thins via tsp and cup, and pureed consistencies given. Overt s/s of aspiration c/b immediate coughing/choking w/ thins via cup and pureed consistencies. Pt not ready for PO given severity of s/s of aspiration. Will f/u tomorrow for repeat BS eval to determine instrumental readiness. RECS: NPO, meds alt route, BS re-eval for instrumental readiness.         Cog/comm eval complete. Per stroke protocol. Mild dysarthria c/b imprecise articulation. Speech was 90% intelligible in conversation. Mild cognitive-linguistic deficit c/b difficulty w/ immediate memory recall, attention, and high level reasoning/problem solving. Pt reports that these deficits are not at baseline and new since this admission. Expressive and receptive language, reading, and writing were all found to be WFL. RECS: Cog/comm tx QD.         Problem: Stroke (Ischemic) (Adult)  Goal: Signs and Symptoms of Listed Potential Problems Will be Absent or Manageable (Stroke)  Outcome: Ongoing (interventions implemented as appropriate)    09/20/17 1606   Stroke (Ischemic)   Problems Assessed (Stroke (Ischemic)/TIA) cognitive impairment;communication impairment;eating/swallowing impairment   Problems Present (Stroke (Ischemic)/TIA) communication impairment;eating/swallowing impairment         Problem: Inpatient SLP  Goal: Dysarthria-Patient will improve motor speech skills to allow optimal participation in care  Outcome: Ongoing (interventions implemented as appropriate)    09/20/17 1606   Dysarthria- Optimal Particpation in Care   Dysarthria Optimal Participation in  Care- SLP, Date Established 09/20/17   Dysarthria Optimal Participation in Care- SLP, Time to Achieve by discharge   Dysarthria Optimal Participation in Care- SLP, Date Goal Reviewed 09/20/17   Dysarthria Optimal Participation in Care- SLP, Outcome goal ongoing       Goal: Cognitive-linguistic-Patient will improve Cognitive-linguistic skills to allow optimal participation in care  Outcome: Ongoing (interventions implemented as appropriate)    09/20/17 1606   Cognitive Linguistic- Optimal Participation in Care   Cognitive Linguistic Optimal Participation in Care- SLP, Date Established 09/20/17   Cognitive Linguistic Optimal Participation in Care- SLP, Time to Achieve by discharge   Cognitive Linguistic Optimal Participation in Care- SLP, Date Goal Reviewed 09/20/17   Cognitive Linguistic Optimal Participation in Care- SLP, Outcome goal ongoing

## 2017-09-21 ENCOUNTER — APPOINTMENT (OUTPATIENT)
Dept: CARDIOLOGY | Facility: HOSPITAL | Age: 72
End: 2017-09-21

## 2017-09-21 ENCOUNTER — APPOINTMENT (OUTPATIENT)
Dept: GENERAL RADIOLOGY | Facility: HOSPITAL | Age: 72
End: 2017-09-21

## 2017-09-21 ENCOUNTER — APPOINTMENT (OUTPATIENT)
Dept: MRI IMAGING | Facility: HOSPITAL | Age: 72
End: 2017-09-21

## 2017-09-21 LAB
ALBUMIN SERPL-MCNC: 3.6 G/DL (ref 3.2–4.8)
ALBUMIN/GLOB SERPL: 1.1 G/DL (ref 1.5–2.5)
ALP SERPL-CCNC: 125 U/L (ref 25–100)
ALT SERPL W P-5'-P-CCNC: 23 U/L (ref 7–40)
ANION GAP SERPL CALCULATED.3IONS-SCNC: 3 MMOL/L (ref 3–11)
ARTICHOKE IGE QN: 117 MG/DL (ref 0–130)
AST SERPL-CCNC: 32 U/L (ref 0–33)
BH CV ECHO MEAS - AO ROOT AREA (BSA CORRECTED): 1.5
BH CV ECHO MEAS - AO ROOT AREA: 8 CM^2
BH CV ECHO MEAS - AO ROOT DIAM: 3.2 CM
BH CV ECHO MEAS - BSA(HAYCOCK): 2.2 M^2
BH CV ECHO MEAS - BSA: 2.1 M^2
BH CV ECHO MEAS - BZI_BMI: 32.8 KILOGRAMS/M^2
BH CV ECHO MEAS - BZI_METRIC_HEIGHT: 172.7 CM
BH CV ECHO MEAS - BZI_METRIC_WEIGHT: 98 KG
BH CV ECHO MEAS - CONTRAST EF (2CH): 67.1 ML/M^2
BH CV ECHO MEAS - CONTRAST EF 4CH: 66.7 ML/M^2
BH CV ECHO MEAS - EDV(CUBED): 72.5 ML
BH CV ECHO MEAS - EDV(MOD-SP2): 79 ML
BH CV ECHO MEAS - EDV(MOD-SP4): 93 ML
BH CV ECHO MEAS - EDV(TEICH): 77.3 ML
BH CV ECHO MEAS - EF(CUBED): 72.6 %
BH CV ECHO MEAS - EF(MOD-SP2): 67.1 %
BH CV ECHO MEAS - EF(MOD-SP4): 66.7 %
BH CV ECHO MEAS - EF(TEICH): 64.7 %
BH CV ECHO MEAS - ESV(CUBED): 19.9 ML
BH CV ECHO MEAS - ESV(MOD-SP2): 26 ML
BH CV ECHO MEAS - ESV(MOD-SP4): 31 ML
BH CV ECHO MEAS - ESV(TEICH): 27.3 ML
BH CV ECHO MEAS - FS: 35 %
BH CV ECHO MEAS - IVS/LVPW: 1.2
BH CV ECHO MEAS - IVSD: 1.3 CM
BH CV ECHO MEAS - LA DIMENSION: 3.7 CM
BH CV ECHO MEAS - LA/AO: 1.2
BH CV ECHO MEAS - LV DIASTOLIC VOL/BSA (35-75): 44 ML/M^2
BH CV ECHO MEAS - LV MASS(C)D: 173 GRAMS
BH CV ECHO MEAS - LV MASS(C)DI: 81.9 GRAMS/M^2
BH CV ECHO MEAS - LV MAX PG: 4.7 MMHG
BH CV ECHO MEAS - LV MEAN PG: 2 MMHG
BH CV ECHO MEAS - LV SYSTOLIC VOL/BSA (12-30): 14.7 ML/M^2
BH CV ECHO MEAS - LV V1 MAX: 108 CM/SEC
BH CV ECHO MEAS - LV V1 MEAN: 58.7 CM/SEC
BH CV ECHO MEAS - LV V1 VTI: 23.1 CM
BH CV ECHO MEAS - LVIDD: 4.2 CM
BH CV ECHO MEAS - LVIDS: 2.7 CM
BH CV ECHO MEAS - LVLD AP2: 7.3 CM
BH CV ECHO MEAS - LVLD AP4: 7.1 CM
BH CV ECHO MEAS - LVLS AP2: 5.9 CM
BH CV ECHO MEAS - LVLS AP4: 5.9 CM
BH CV ECHO MEAS - LVOT AREA (M): 3.1 CM^2
BH CV ECHO MEAS - LVOT AREA: 3.1 CM^2
BH CV ECHO MEAS - LVOT DIAM: 2 CM
BH CV ECHO MEAS - LVPWD: 1.1 CM
BH CV ECHO MEAS - MV A MAX VEL: 71.6 CM/SEC
BH CV ECHO MEAS - MV E MAX VEL: 68.1 CM/SEC
BH CV ECHO MEAS - MV E/A: 0.95
BH CV ECHO MEAS - PA ACC SLOPE: 540 CM/SEC^2
BH CV ECHO MEAS - PA ACC TIME: 0.12 SEC
BH CV ECHO MEAS - PA PR(ACCEL): 25 MMHG
BH CV ECHO MEAS - RAP SYSTOLE: 8 MMHG
BH CV ECHO MEAS - RVDD: 3.4 CM
BH CV ECHO MEAS - RVSP: 32.8 MMHG
BH CV ECHO MEAS - SI(CUBED): 24.9 ML/M^2
BH CV ECHO MEAS - SI(LVOT): 34.4 ML/M^2
BH CV ECHO MEAS - SI(MOD-SP2): 25.1 ML/M^2
BH CV ECHO MEAS - SI(MOD-SP4): 29.4 ML/M^2
BH CV ECHO MEAS - SI(TEICH): 23.7 ML/M^2
BH CV ECHO MEAS - SV(CUBED): 52.6 ML
BH CV ECHO MEAS - SV(LVOT): 72.6 ML
BH CV ECHO MEAS - SV(MOD-SP2): 53 ML
BH CV ECHO MEAS - SV(MOD-SP4): 62 ML
BH CV ECHO MEAS - SV(TEICH): 50 ML
BH CV ECHO MEAS - TAPSE (>1.6): 2.5 CM2
BH CV ECHO MEAS - TR MAX VEL: 249 CM/SEC
BH CV VAS BP LEFT ARM: NORMAL MMHG
BILIRUB SERPL-MCNC: 0.6 MG/DL (ref 0.3–1.2)
BUN BLD-MCNC: 16 MG/DL (ref 9–23)
BUN/CREAT SERPL: 17.8 (ref 7–25)
CALCIUM SPEC-SCNC: 8.9 MG/DL (ref 8.7–10.4)
CHLORIDE SERPL-SCNC: 100 MMOL/L (ref 99–109)
CHOLEST SERPL-MCNC: 152 MG/DL (ref 0–200)
CO2 SERPL-SCNC: 32 MMOL/L (ref 20–31)
CREAT BLD-MCNC: 0.9 MG/DL (ref 0.6–1.3)
DEPRECATED RDW RBC AUTO: 42.4 FL (ref 37–54)
ERYTHROCYTE [DISTWIDTH] IN BLOOD BY AUTOMATED COUNT: 13.5 % (ref 11.3–14.5)
GFR SERPL CREATININE-BSD FRML MDRD: 62 ML/MIN/1.73
GLOBULIN UR ELPH-MCNC: 3.3 GM/DL
GLUCOSE BLD-MCNC: 152 MG/DL (ref 70–100)
GLUCOSE BLDC GLUCOMTR-MCNC: 134 MG/DL (ref 70–130)
GLUCOSE BLDC GLUCOMTR-MCNC: 153 MG/DL (ref 70–130)
GLUCOSE BLDC GLUCOMTR-MCNC: 170 MG/DL (ref 70–130)
GLUCOSE BLDC GLUCOMTR-MCNC: 174 MG/DL (ref 70–130)
GLUCOSE BLDC GLUCOMTR-MCNC: 186 MG/DL (ref 70–130)
HBA1C MFR BLD: 9 % (ref 4.8–5.6)
HCT VFR BLD AUTO: 43.8 % (ref 34.5–44)
HDLC SERPL-MCNC: 27 MG/DL (ref 40–60)
HGB BLD-MCNC: 13.7 G/DL (ref 11.5–15.5)
LEFT ATRIUM VOLUME INDEX: 25 ML/M2
LV EF 2D ECHO EST: 75 %
MCH RBC QN AUTO: 27.1 PG (ref 27–31)
MCHC RBC AUTO-ENTMCNC: 31.3 G/DL (ref 32–36)
MCV RBC AUTO: 86.6 FL (ref 80–99)
PLATELET # BLD AUTO: 284 10*3/MM3 (ref 150–450)
PMV BLD AUTO: 9.9 FL (ref 6–12)
POTASSIUM BLD-SCNC: 4 MMOL/L (ref 3.5–5.5)
PROT SERPL-MCNC: 6.9 G/DL (ref 5.7–8.2)
RBC # BLD AUTO: 5.06 10*6/MM3 (ref 3.89–5.14)
SODIUM BLD-SCNC: 135 MMOL/L (ref 132–146)
TRIGL SERPL-MCNC: 210 MG/DL (ref 0–150)
WBC NRBC COR # BLD: 9.37 10*3/MM3 (ref 3.5–10.8)

## 2017-09-21 PROCEDURE — G0108 DIAB MANAGE TRN  PER INDIV: HCPCS | Performed by: REGISTERED NURSE

## 2017-09-21 PROCEDURE — 87186 SC STD MICRODIL/AGAR DIL: CPT | Performed by: INTERNAL MEDICINE

## 2017-09-21 PROCEDURE — 93306 TTE W/DOPPLER COMPLETE: CPT

## 2017-09-21 PROCEDURE — 25010000002 LORAZEPAM PER 2 MG: Performed by: PSYCHIATRY & NEUROLOGY

## 2017-09-21 PROCEDURE — 97166 OT EVAL MOD COMPLEX 45 MIN: CPT | Performed by: OCCUPATIONAL THERAPIST

## 2017-09-21 PROCEDURE — 92507 TX SP LANG VOICE COMM INDIV: CPT

## 2017-09-21 PROCEDURE — 85027 COMPLETE CBC AUTOMATED: CPT | Performed by: NURSE PRACTITIONER

## 2017-09-21 PROCEDURE — 73660 X-RAY EXAM OF TOE(S): CPT

## 2017-09-21 PROCEDURE — 70551 MRI BRAIN STEM W/O DYE: CPT

## 2017-09-21 PROCEDURE — 97162 PT EVAL MOD COMPLEX 30 MIN: CPT

## 2017-09-21 PROCEDURE — 80061 LIPID PANEL: CPT | Performed by: NURSE PRACTITIONER

## 2017-09-21 PROCEDURE — 87077 CULTURE AEROBIC IDENTIFY: CPT | Performed by: INTERNAL MEDICINE

## 2017-09-21 PROCEDURE — 25010000002 LORAZEPAM PER 2 MG: Performed by: FAMILY MEDICINE

## 2017-09-21 PROCEDURE — 99233 SBSQ HOSP IP/OBS HIGH 50: CPT | Performed by: FAMILY MEDICINE

## 2017-09-21 PROCEDURE — 83036 HEMOGLOBIN GLYCOSYLATED A1C: CPT | Performed by: NURSE PRACTITIONER

## 2017-09-21 PROCEDURE — 63710000001 INSULIN LISPRO (HUMAN) PER 5 UNITS: Performed by: FAMILY MEDICINE

## 2017-09-21 PROCEDURE — 25010000002 HYDROMORPHONE PER 4 MG: Performed by: HOSPITALIST

## 2017-09-21 PROCEDURE — 87147 CULTURE TYPE IMMUNOLOGIC: CPT | Performed by: INTERNAL MEDICINE

## 2017-09-21 PROCEDURE — 87070 CULTURE OTHR SPECIMN AEROBIC: CPT | Performed by: INTERNAL MEDICINE

## 2017-09-21 PROCEDURE — 80053 COMPREHEN METABOLIC PANEL: CPT | Performed by: NURSE PRACTITIONER

## 2017-09-21 PROCEDURE — 92612 ENDOSCOPY SWALLOW (FEES) VID: CPT

## 2017-09-21 PROCEDURE — 92610 EVALUATE SWALLOWING FUNCTION: CPT

## 2017-09-21 PROCEDURE — 87205 SMEAR GRAM STAIN: CPT | Performed by: INTERNAL MEDICINE

## 2017-09-21 PROCEDURE — 93306 TTE W/DOPPLER COMPLETE: CPT | Performed by: INTERNAL MEDICINE

## 2017-09-21 PROCEDURE — 99232 SBSQ HOSP IP/OBS MODERATE 35: CPT | Performed by: PSYCHIATRY & NEUROLOGY

## 2017-09-21 PROCEDURE — 82962 GLUCOSE BLOOD TEST: CPT

## 2017-09-21 RX ORDER — LORAZEPAM 2 MG/ML
1 INJECTION INTRAMUSCULAR ONCE
Status: COMPLETED | OUTPATIENT
Start: 2017-09-21 | End: 2017-09-21

## 2017-09-21 RX ORDER — GABAPENTIN 300 MG/1
300 CAPSULE ORAL EVERY 8 HOURS SCHEDULED
Status: DISCONTINUED | OUTPATIENT
Start: 2017-09-21 | End: 2017-09-23 | Stop reason: HOSPADM

## 2017-09-21 RX ORDER — SODIUM CHLORIDE 9 MG/ML
75 INJECTION, SOLUTION INTRAVENOUS CONTINUOUS
Status: DISCONTINUED | OUTPATIENT
Start: 2017-09-21 | End: 2017-09-22

## 2017-09-21 RX ORDER — LORAZEPAM 2 MG/ML
1.5 INJECTION INTRAMUSCULAR ONCE
Status: DISCONTINUED | OUTPATIENT
Start: 2017-09-21 | End: 2017-09-21

## 2017-09-21 RX ADMIN — LORAZEPAM 1 MG: 2 INJECTION INTRAMUSCULAR; INTRAVENOUS at 05:11

## 2017-09-21 RX ADMIN — HYDROMORPHONE HYDROCHLORIDE 0.5 MG: 1 INJECTION, SOLUTION INTRAMUSCULAR; INTRAVENOUS; SUBCUTANEOUS at 13:55

## 2017-09-21 RX ADMIN — SODIUM CHLORIDE 75 ML/HR: 9 INJECTION, SOLUTION INTRAVENOUS at 11:12

## 2017-09-21 RX ADMIN — LIDOCAINE 1 PATCH: 50 PATCH CUTANEOUS at 20:21

## 2017-09-21 RX ADMIN — LIDOCAINE 1 PATCH: 50 PATCH CUTANEOUS at 08:56

## 2017-09-21 RX ADMIN — ASPIRIN 300 MG: 300 SUPPOSITORY RECTAL at 10:07

## 2017-09-21 RX ADMIN — LORAZEPAM 1 MG: 2 INJECTION INTRAMUSCULAR; INTRAVENOUS at 11:04

## 2017-09-21 RX ADMIN — HYDROMORPHONE HYDROCHLORIDE 0.5 MG: 1 INJECTION, SOLUTION INTRAMUSCULAR; INTRAVENOUS; SUBCUTANEOUS at 20:19

## 2017-09-21 RX ADMIN — HYDROMORPHONE HYDROCHLORIDE 0.5 MG: 1 INJECTION, SOLUTION INTRAMUSCULAR; INTRAVENOUS; SUBCUTANEOUS at 04:46

## 2017-09-21 RX ADMIN — HYDROMORPHONE HYDROCHLORIDE 0.5 MG: 1 INJECTION, SOLUTION INTRAMUSCULAR; INTRAVENOUS; SUBCUTANEOUS at 08:52

## 2017-09-21 RX ADMIN — HYDROMORPHONE HYDROCHLORIDE 0.5 MG: 1 INJECTION, SOLUTION INTRAMUSCULAR; INTRAVENOUS; SUBCUTANEOUS at 17:47

## 2017-09-21 RX ADMIN — HYDROMORPHONE HYDROCHLORIDE 0.5 MG: 1 INJECTION, SOLUTION INTRAMUSCULAR; INTRAVENOUS; SUBCUTANEOUS at 22:32

## 2017-09-21 RX ADMIN — HYDROMORPHONE HYDROCHLORIDE 0.5 MG: 1 INJECTION, SOLUTION INTRAMUSCULAR; INTRAVENOUS; SUBCUTANEOUS at 00:33

## 2017-09-21 RX ADMIN — HYDROMORPHONE HYDROCHLORIDE 0.5 MG: 1 INJECTION, SOLUTION INTRAMUSCULAR; INTRAVENOUS; SUBCUTANEOUS at 02:39

## 2017-09-21 NOTE — PROGRESS NOTES
"      HOSPITALIST DAILY PROGRESS NOTE    Chief Complaint: right weakness, dysarthria     Subjective   SUBJECTIVE/OVERNIGHT EVENTS   Pt sitting up on side of bed today, son at bedside.  Failed swallow eval last pm but still with good urination. Doesn't want keofeed.  Couldn't tolerate MRI yesterday with 1mg ativan . Came back to room and slept  A few hours though. Agreeable to try again today with headphones and ativan. Getting choked when she swallows   Review of Systems:  Gen-no fevers, no chills, right weakness   CV-no chest pain, no palpitations  Resp-no cough, no dyspnea  GI-no N/V/D, no abd pain    Otherwise complete ROS is negative except as mentioned in the HPI.    Objective   OBJECTIVE   I have reviewed the vital signs.  /69  Pulse 61  Temp 97.6 °F (36.4 °C) (Axillary)   Resp 16  Ht 68\" (172.7 cm)  Wt 215 lb (97.5 kg)  SpO2 95%  BMI 32.69 kg/m2    Physical Exam:  Gen-no acute distress, sitting up at bedside. Anxious, hand wringing   CV-RRR, S1 S2 normal, no m/r/g  Resp-CTAB, no wheezes  Abd-soft, NT, ND, +BS  Ext-no edema, less weakness on right side today , still mild facial droop   Neuro-mildly slurred speech occasionally,   Psych-appropriate mood, anxious     Results:  I have reviewed the labs, culture data, radiology results, and diagnostic studies.      Results from last 7 days  Lab Units 09/21/17  0525 09/20/17  1205   WBC 10*3/mm3 9.37 8.05   HEMOGLOBIN g/dL 13.7 12.9   HEMATOCRIT % 43.8 41.9   PLATELETS 10*3/mm3 284 261       Results from last 7 days  Lab Units 09/21/17  0525   SODIUM mmol/L 135   POTASSIUM mmol/L 4.0   CHLORIDE mmol/L 100   CO2 mmol/L 32.0*   BUN mg/dL 16   CREATININE mg/dL 0.90   GLUCOSE mg/dL 152*   CALCIUM mg/dL 8.9       Culture Data:  Cultures:           Radiology Results:  Imaging Results (last 24 hours)     Procedure Component Value Units Date/Time    CT Cerebral Perfusion With & Without Contrast [[id number]] Collected:  09/20/17 1151     Updated:  09/20/17 1246 "    Narrative:       EXAMINATION: CT CEREBRAL PERFUSION WITH AND WITHOUT CONTRAST-09/20/2017:        INDICATION: CVA.      TECHNIQUE: CT cerebral perfusion with and without intravenous contrast  administration. Multiple parametric maps including mean transit time,  time to drain, cerebral blood flow and cerebral blood volume were  performed.     The radiation dose reduction device was turned on for each scan per the  ALARA (As Low as Reasonably Achievable) protocol.     COMPARISON: CT head 03/12/2014.     FINDINGS: No focal defect in parametric maps of mean transit time or  time to drain. Cerebral blood flow and cerebral blood volume preserved  without focal defect to suggest acute ischemia involving a specific  vascular territory or ischemic penumbra.       Impression:       Normal CT cerebral perfusion without evidence for focal  defect to suggest acute ischemia of specific vascular territory.     D:  09/20/2017  E:  09/20/2017           This report was finalized on 9/20/2017 12:43 PM by Dr. Bryce Willis.       CT Angiogram Head With & Without Contrast [[id number]] Collected:  09/20/17 1212     Updated:  09/20/17 1246    Narrative:       EXAMINATION: CT ANGIOGRAM HEAD W WO CONTRAST-, CT ANGIOGRAM NECK W WO  CONTRAST- 09/20/2017     INDICATION: CVA      TECHNIQUE: CT angiogram of the head and neck with and without  intravenous contrast administration.     The radiation dose reduction device was turned on for each scan per the  ALARA (As Low as Reasonably Achievable) protocol.     COMPARISON: NONE     FINDINGS:      NECK: Normal 3 vessel arch with patent great vessel origins. Right  dominant vertebral artery system demonstrating patency without  hemodynamically significant stenosis, aneurysm or occlusion. Bilateral  cervical carotid systems demonstrate severely retropharyngeal course in  the midportion. Minimal atherosclerotic involvement of the bilateral  carotid bulbs with approximately 15% right and 10% left  luminal  narrowing.      Measurements performed utilizing NASCET criteria.      Head: Distal internal carotid arteries are patent without  hemodynamically significant stenosis, aneurysm or occlusion. [place] of  [place] and branch vessels including anterior, middle and posterior  cerebral arteries patent without hemodynamically significant stenosis,  aneurysm or occlusion. Fetal origin of the right PCA noted.  Vertebrobasilar system patent without hemodynamically significant  stenosis, aneurysm or occlusion. Tortuosity and high branching pattern  of the vertebral or persistent fetal origins.        Impression:       CTA of the head and neck without hemodynamically significant  stenosis, aneurysm or occlusion.     D:  09/20/2017  E:  09/20/2017     This report was finalized on 9/20/2017 12:43 PM by Dr. Bryce Willis.       CT Angiogram Neck With & Without Contrast [[id number]] Collected:  09/20/17 1212     Updated:  09/20/17 1246    Narrative:       EXAMINATION: CT ANGIOGRAM HEAD W WO CONTRAST-, CT ANGIOGRAM NECK W WO  CONTRAST- 09/20/2017     INDICATION: CVA      TECHNIQUE: CT angiogram of the head and neck with and without  intravenous contrast administration.     The radiation dose reduction device was turned on for each scan per the  ALARA (As Low as Reasonably Achievable) protocol.     COMPARISON: NONE     FINDINGS:      NECK: Normal 3 vessel arch with patent great vessel origins. Right  dominant vertebral artery system demonstrating patency without  hemodynamically significant stenosis, aneurysm or occlusion. Bilateral  cervical carotid systems demonstrate severely retropharyngeal course in  the midportion. Minimal atherosclerotic involvement of the bilateral  carotid bulbs with approximately 15% right and 10% left luminal  narrowing.      Measurements performed utilizing NASCET criteria.      Head: Distal internal carotid arteries are patent without  hemodynamically significant stenosis, aneurysm or occlusion.  [place] of  [place] and branch vessels including anterior, middle and posterior  cerebral arteries patent without hemodynamically significant stenosis,  aneurysm or occlusion. Fetal origin of the right PCA noted.  Vertebrobasilar system patent without hemodynamically significant  stenosis, aneurysm or occlusion. Tortuosity and high branching pattern  of the vertebral or persistent fetal origins.        Impression:       CTA of the head and neck without hemodynamically significant  stenosis, aneurysm or occlusion.     D:  09/20/2017  E:  09/20/2017     This report was finalized on 9/20/2017 12:43 PM by Dr. Bryce Willis.             I have reviewed the medications.      Assessment/Plan   ASSESSMENT/PLAN    Active Problems:    CVA (cerebral vascular accident)    Diabetes mellitus type 2, uncontrolled, with complications    Chronic pain    Dementia    Polyneuropathy    Peripheral vascular disease    Acid reflux    Chronic osteomyelitis    Depression    Emphysema/COPD    Hypertension    Likely [place] vs Lacunar infarct  --CT, CTA all negative, difficulty tolerating MRI secondary to claustrophobia , we will try again today  --failed swallow eval.  Repeat as I feel she is much stronger and less dysarthria today, start ivf, if unable to swallow for long will need to think of other ways to get in nutrition   --asa, statin  --ECHO prelim 65%  --PT/OT/ST    DM  --A1C 9.0  --uncontrolled  --ssi/accuchecks    HTN  --uncontrolled at home, she says it's often 200's     Hyperlipidemia  --, DM    Chronic Pain  --multiple medications, tapered while here, she seems to be tolerating without complaints    Severe Anxiety  --only takes 0.25 xanax at home reportedly bid.  Will continue     Dementia/Depression   --continue home meds    COPD  --nebs    I expect patient to be discharged in: 1-2 days     Farhana Merlos,   09/21/17  10:31 AM

## 2017-09-21 NOTE — PLAN OF CARE
Problem: Patient Care Overview (Adult)  Goal: Plan of Care Review  Outcome: Ongoing (interventions implemented as appropriate)    09/21/17 1411   Coping/Psychosocial Response Interventions   Plan Of Care Reviewed With patient   Outcome Evaluation   Outcome Summary/Follow up Plan PT PRESENST WITH EVOLVING SYMTPOMS S/P CVA TO INCLUDE, WEAKNESS, IMPAIRED BALANCE AND DECLINE IN FUNCITONAL MOBILITY. 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 RECOMMEND IRF AT D/C.          Problem: Stroke (Ischemic) (Adult)  Goal: Signs and Symptoms of Listed Potential Problems Will be Absent or Manageable (Stroke)  Outcome: Ongoing (interventions implemented as appropriate)    09/21/17 1411   Stroke (Ischemic)   Problems Assessed (Stroke (Ischemic)/TIA) cognitive impairment;communication impairment;motor/sensory impairment   Problems Present (Stroke (Ischemic)/TIA) motor/sensory impairment         Problem: Inpatient Physical Therapy  Goal: Bed Mobility Goal LTG- PT  Outcome: Ongoing (interventions implemented as appropriate)    09/21/17 1411   Bed Mobility PT LTG   Bed Mobility PT LTG, Time to Achieve 2 wks   Bed Mobility PT LTG, Activity Type all bed mobility   Bed Mobility PT LTG, [place] Level independent       Goal: Transfer Training Goal 1 LTG- PT  Outcome: Ongoing (interventions implemented as appropriate)    09/21/17 1411   Transfer Training PT LTG   Transfer Training PT LTG, Time to Achieve 2 wks   Transfer Training PT LTG, Activity Type all transfers   Transfer Training PT LTG, [place] contact guard assist   Transfer Training PT LTG, Assist Device walker, rolling       Goal: Gait Training Goal LTG- PT  Outcome: Ongoing (interventions implemented as appropriate)

## 2017-09-21 NOTE — CONSULTS
" Discussed and taught patient about type 2 diabetes self-management, risk factors, and importance of blood glucose control to reduce complications. Target blood glucose readings and A1c goals per ADA were reviewed. Reviewed with patient current A1c and discussed its significance. Signs, symptoms and treatment of hyperglycemia and hypoglycemia were discussed. She tells me that her current A1C of 9% is decreased from recent readings of 10.6%. She admits to no barriers to her care only making herself do, \"the things I know I should do\"  Copies of blood glucose goals were also given to Mrs Arias. Questions were answered. Son here at bedside. Thank you for this referral. Kathy Fernando RN.    "

## 2017-09-21 NOTE — PLAN OF CARE
Problem: Patient Care Overview (Adult)  Goal: Plan of Care Review  Outcome: Ongoing (interventions implemented as appropriate)    09/21/17 1401   Coping/Psychosocial Response Interventions   Plan Of Care Reviewed With patient   Outcome Evaluation   Outcome Summary/Follow up Plan Pt s/p CVA & now with R UE weakness and dec. balance along w/ mildly slurred speech. These deficits impact safety and ability to complete daily tasks. Recommend IP rehab at d/c.          Problem: Stroke (Ischemic) (Adult)  Goal: Signs and Symptoms of Listed Potential Problems Will be Absent or Manageable (Stroke)  Outcome: Ongoing (interventions implemented as appropriate)    09/21/17 1401   Stroke (Ischemic)   Problems Assessed (Stroke (Ischemic)/TIA) motor/sensory impairment   Problems Present (Stroke (Ischemic)/TIA) motor/sensory impairment         Problem: Inpatient Occupational Therapy  Goal: Bed Mobility Goal LTG- OT  Outcome: Ongoing (interventions implemented as appropriate)    09/21/17 1401   Bed Mobility OT LTG   Bed Mobility OT LTG, Time to Achieve 5 - 7 days   Bed Mobility OT LTG, Activity Type supine to sit/sit to supine   Bed Mobility OT LTG, [place] Level supervision required   Bed Mobility OT LTG, Assist Device bed rails   Bed Mobility OT LTG, Outcome goal ongoing       Goal: Transfer Training Goal 1 LTG- OT  Outcome: Ongoing (interventions implemented as appropriate)    09/21/17 1401   Transfer Training OT LTG   Transfer Training OT LTG, Time to Achieve 5 - 7 days   Transfer Training OT LTG, Activity Type toilet;bed to chair /chair to bed   Transfer Training OT LTG, [place] Level supervision required   Transfer Training OT LTG, Assist Device walker, rolling   Transfer Training OT LTG, Outcome goal ongoing       Goal: Strength Goal LTG- OT  Outcome: Ongoing (interventions implemented as appropriate)    09/21/17 1401   Strength OT LTG   Strength Goal OT LTG, Time to Achieve 5 - 7 days   Strength Goal OT LTG,  Additional Goal pt to be independent with RUE strengthening and ROM HEP to promote increased independence and safety with daily tasks by d/c.          09/21/17 1401   Strength OT LTG   Strength Goal OT LTG, Time to Achieve 5 - 7 days   Strength Goal OT LTG, Additional Goal pt to be independent with RUE strengthening and ROM HEP to promote increased independence and safety with daily tasks by d/c.    Strength Goal OT LTG, Outcome goal ongoing       Goal: Toileting Goal LTG- OT  Outcome: Ongoing (interventions implemented as appropriate)    09/21/17 1401   Toileting OT LTG   Toileting Goal OT LTG, Time to Achieve 5 - 7 days   Toileting Goal OT LTG, [place] Level supervision required   Toileting Goal OT LTG, Additional Goal toileting and hygiene    Toileting Goal OT LTG, Outcome goal ongoing

## 2017-09-21 NOTE — THERAPY EVALUATION
Acute Care - Occupational Therapy Initial Evaluation  [facility]     Patient Name: Romy Arias  : 1945  MRN: [id number]  Today's Date: 2017  Onset of Illness/Injury or Date of Surgery Date: 17  Date of Referral to OT: 17  Referring Physician: JJ MANRIQUE    Admit Date: 2017       ICD-10-CM ICD-9-CM   1. Dysphagia, unspecified R13.10 787.20   2. Cognitive communication deficit R41.841 799.52   3. Impaired mobility and ADLs Z74.09 799.89   4. Impaired functional mobility, balance, gait, and endurance Z74.09 V49.89     Patient Active Problem List   Diagnosis   • Chronic pain   • Dementia   • Polyneuropathy   • Peripheral vascular disease   • Acid reflux   • Chronic osteomyelitis   • Depression   • Diverticulosis   • Emphysema/COPD   • Hypertension   • Diabetes mellitus type 2, uncontrolled, with complications   • CVA (cerebral vascular accident)     Past Medical History:   Diagnosis Date   • Arthritis    • Cancer    • Colon polyps    • Gallstone    • Hypertension    • Rheumatic fever    • Sepsis due to urinary tract infection    • Stroke      Past Surgical History:   Procedure Laterality Date   • BLADDER SURGERY     • GALLBLADDER SURGERY     • HYSTERECTOMY      JUSTEN   • THYROID SURGERY            OT ASSESSMENT FLOWSHEET (last 72 hours)      OT Evaluation       17 1315 17 1310 17 1051 17 1049 17 1415    Rehab Evaluation    Document Type evaluation   COMPLETED CHART REVIEW [id number]. CO-RX WITH O.T.   -CD evaluation  -ST   evaluation  -RD    Subjective Information agree to therapy;complains of;pain   CHRONIC NEUROPATHIC PAIN B FEET.   -CD no complaints;agree to therapy  -ST   no complaints;agree to therapy  -RD    Patient Effort, Rehab Treatment good  -CD good  -ST   good  -RD    Symptoms Noted During/After Treatment fatigue;increased pain  -CD none  -ST       General Information    Patient Profile Review yes  -CD yes  -ST       Onset of Illness/Injury or  Date of Surgery Date 09/20/17  -CD 09/20/17  -ST       Referring Physician JJ MANRIQUE  -JJ Dai       General Observations PT SUPINE UPON ARRIVAL ON RA. SON PRESENT.   -CD pt supine upon arrival; family present; IV intact  -ST       Pertinent History Of Current Problem TO OSH WITH ACUTE ONSET OF SLURRED SPEECH, R HP. TRANSFERRED TO [facility]. CT NEG, MRI REVEALED LACUNAR INFARCT L ROX. PER CHART PT HAS CHRONIC OSTEOMYELTIS R FIRST TOE. PT IS CURRENTLY NPO.   -CD Pt with sudden onset of R HP and slurred speech; To OSH originally then moved to [facility] for higher level of care. Likely lacunar infarct   -ST       Precautions/Limitations fall precautions;insensate limb  -CD fall precautions;other (see comments)   chronic neuropathy B feet w/no sensation, R UE weakness  -ST       Prior Level of Function independent:;all household mobility;community mobility;ADL's;driving  -CD independent:;all household mobility;community mobility;transfer;bed mobility;ADL's;feeding;grooming;dressing;bathing;home management;cooking;cleaning;driving;shopping  -ST       Equipment Currently Used at Home cane, straight;commode  -CD cane, straight;commode  -ST cane, straight;raised toilet  -AB      Plans/Goals Discussed With patient and family;agreed upon  -CD patient;agreed upon  -ST       Risks Reviewed patient and family:;LOB;change in vital signs;increased discomfort  -CD patient:;LOB;nausea/vomiting;dizziness;increased discomfort;change in vital signs  -ST       Benefits Reviewed patient and family:;improve function;increase independence;increase strength;increase balance;decrease pain;increase knowledge  -CD patient:;improve function;increase independence;increase strength;increase balance;decrease pain;increase knowledge  -ST       Barriers to Rehab medically complex  -CD medically complex  -ST       Living Environment    Lives With alone   FAMILY AVAILABLE PRN.   -CD alone  -ST alone  -AB  alone  -RD    Living Arrangements house   -CD house  -ST house   Lives in [facility]  -AB  house  -RD    Home Accessibility stairs to enter home;bed and bath on same level;tub/shower is not walk in  -CD no concerns;bed and bath on same level;stairs to enter home;tub/shower is not walk in  -ST no concerns;bed and bath on same level;stairs to enter home  -AB      Number of Stairs to Enter Home 2  -CD 2  -ST 2  -AB      Stair Railings at Home none  -CD none  -ST none  -AB      Type of Financial/Environmental Concern  none  -ST none  -AB      Transportation Available   car;family or friend will provide  -AB      Living Environment Comment WILL LIKELY NEED IRF AT D/C.   -CD pt lives alone however has family available and close by to assist   -ST       Clinical Impression    Date of Referral to OT  09/21/17  -ST       OT Diagnosis  impaired ADLs  -ST       Prognosis  good  -ST       Patient/Family Goals Statement  return home  -ST       Criteria for Skilled Therapeutic Interventions Met  yes  -ST       Rehab Potential  good, to achieve stated therapy goals  -ST       Therapy Frequency  daily  -ST       Anticipated Equipment Needs At Discharge  --   TBD  -ST       Anticipated Discharge Disposition  inpatient rehabilitation facility  -ST       Functional Level Prior    Ambulation    1-->assistive equipment   Uses a rolling walker  -AB     Transferring    0-->independent  -AB     Toileting    1-->assistive equipment   Raised toilet  -AB     Bathing    0-->independent  -AB     Dressing    0-->independent  -AB     Eating    0-->independent  -AB     Communication    0-->understands/communicates without difficulty  -AB     Swallowing    0-->swallows foods/liquids without difficulty  -AB     Vital Signs    Pre Systolic BP Rehab (P)  --   VITALS PER O.T. NOTE.   -  -ST       Pre Treatment Diastolic BP  79  -ST       Posttreatment Heart Rate (beats/min)  --   71  -ST       Pre Patient Position  Sitting  -ST       Post Patient Position  Sitting  -ST       Pain  Assessment    Pain Assessment (P)  0-10  -CD Allen-Solo FACES  -ST   No/denies pain  -RD    Allen-Solo FACES Pain Rating (P)  4  -CD 4  -ST       Pain Score (P)  10  -CD        Post Pain Score (P)  10  -CD        Pain Type (P)  Chronic pain  -CD Chronic pain  -ST       Pain Location (P)  Foot  -CD Foot  -ST       Pain Orientation (P)  Right;Left  -CD        Pain Intervention(s) (P)  Repositioned;Ambulation/increased activity;Medication (See MAR)   [facility] CALLED TO BRING PAIN MED.   -CD --   asked RN for pain meds  -ST       Vision Assessment/Intervention    Visual Impairment (P)  visual impairment, right  -CD WFL with corrective lenses  -ST       Visual Acuity Comment  dec. peripheral vision on R, tracking WFL   -ST       Vision Comment (P)  PER O.T. TRACKING. HAS GLASSESS.   -CD        Cognitive Assessment/Intervention    Current Cognitive/Communication Assessment (P)  functional  -CD functional  -ST   impaired  -RD    Orientation Status (P)  oriented to;person;place  -CD oriented to;person;place  -ST   oriented x 4  -RD    Follows Commands/Answers Questions (P)  100% of the time;able to follow single-step instructions;needs cueing  -% of the time  -ST   100% of the time;able to follow multi-step instructions  -RD    Personal Safety (P)  mild impairment;impulsive  -CD mild impairment  -ST       Personal Safety Interventions (P)  fall prevention program maintained;gait belt;muscle strengthening facilitated;nonskid shoes/slippers when out of bed;supervised activity  -CD fall prevention program maintained;gait belt;nonskid shoes/slippers when out of bed  -ST       Short/Long Term Memory     mild impairment, short term memory  -RD    Additional Documentation     Cognitive Assessment Intervention (Group)  -RD    Cognitive Assessment Intervention    Behavior/Mood Observations     alert;cooperative;distractible  -RD    Attention     mild impairment  -RD    Pragmatics     WNL/WFL  -RD    Problem Solving     mild  impairment  -RD    Executive Function Skills     mild impairment  -RD    Reasoning     mild impairment  -RD    Sequencing     mild impairment  -RD    Diffuse Language Characteristics     no concerns, diffuse language characteristics  -RD    ROM (Range of Motion)    General ROM (P)  lower extremity range of motion deficits identified   MILD DF LIMITATION B. OTHERWISE WFL'S   -CD        General ROM Detail  RUE shoulder flexion and abd impaired at baseline d/t shoulder injury/sx; LUE WFL  -ST       MMT (Manual Muscle Testing)    General MMT Assessment (P)  lower extremity strength deficits identified   GROSSLY 4/5 B LE.   -CD        General MMT Assessment Detail  R shoulder flexion n/t d/t pain w/ROM, flexion and extension elbow 4-/5,  ~3+/5  -ST       Bed Mobility, Assessment/Treatment    Bed Mobility, Assistive Device (P)  bed rails;head of bed elevated  -CD bed rails;head of bed elevated  -       Bed Mobility, Scoot/Bridge, [place]  supervision required  -       Bed Mob, Supine to Sit, [place] (P)  minimum assist (75% patient effort);2 person assist required;verbal cues required  -CD minimum assist (75% patient effort);2 person assist required  -       Bed Mobility, Safety Issues (P)  decreased use of arms for pushing/pulling;decreased use of legs for bridging/pushing  -CD decreased use of arms for pushing/pulling;decreased use of legs for bridging/pushing  -       Bed Mobility, Impairments (P)  strength decreased  -CD decreased flexibility;ROM decreased;strength decreased  -       Bed Mobility, Comment (P)  MIN ASSIST TO UPRIGHT TRUNK.   -CD increased time to complete, completed semi-log roll to side to grasp bed rail and assist with pushing up   -ST       Transfer Assessment/Treatment    Transfers, Sit-Stand [place] (P)  minimum assist (75% patient effort);2 person assist required;verbal cues required  -CD minimum assist (75% patient effort);2 person assist required  -        Transfers, Stand-Sit [place] (P)  minimum assist (75% patient effort);verbal cues required  -CD minimum assist (75% patient effort);2 person assist required  -ST       Transfers, Sit-Stand-Sit, Assist Device (P)  --   VIA GAIT BELT AND B UE SUPPORT.   -CD        Functional Mobility    Functional Mobility- Ind. Level  minimum assist (75% patient effort);2 person assist required  -ST       Functional Mobility- Device  --   BUE support  -ST       Functional Mobility-Distance (Feet)  62  -ST       Functional Mobility- Comment  required BUE support for stability   -ST       Lower Body Dressing Assessment/Training    LB Dressing Assess/Train, Clothing Type  doffing:;donning:;socks  -ST       LB Dressing Assess/Train, Position  sitting  -ST       LB Dressing Assess/Train, [place]  supervision required  -ST       LB Dressing Assess/Train, Comment  simulated   -ST       Motor Skills/Interventions    Additional Documentation (P)  Balance Skills Training (Group)  -CD Balance Skills Training (Group);Fine Motor Coordination Training (Group);Gross Motor Coordination Training (Group)  -ST       Balance Skills Training    Sitting-Level of Assistance  Contact guard   progression to SBA  -ST       Sitting-Balance Support  Feet supported  -ST       Standing-Level of Assistance  Contact guard;x2  -ST       Static Standing Balance Support  Right upper extremity supported;Left upper extremity supported  -ST       Gait Balance-Level of Assistance  Minimum assistance;x2  -ST       Gait Balance Support  Right upper extremity supported;Left upper extremity supported  -ST       Gross Motor Coordination Training    Gross Motor Skill, Impairments Detail  rapid alternating intact bilaterally   -ST       Fine Motor Coordination Training    Opposition  Right:;Left:;intact  -ST       Sensory Assessment/Intervention    Light Touch  RUE  -ST       RUE Light Touch  mild impairment   numbness and tingling hand   -ST       General Therapy  Interventions    Planned Therapy Interventions  activity intolerance;adaptive equipment training;ADL retraining;IADL retraining;balance training;bed mobility training;fine motor coordination training;home exercise program;strengthening;transfer training  -ST       Positioning and Restraints    Pre-Treatment Position  in bed  -ST       Post Treatment Position  chair  -ST       In Chair  notified nsg;reclined;call light within reach;encouraged to call for assist;exit alarm on  -ST         09/20/17 1213 09/20/17 1204             General Information    Equipment Currently Used at Home  cane, straight;raised toilet  -AW       Living Environment    Lives With alone  -AW        Living Arrangements house  -AW        Home Accessibility no concerns  -AW        Stair Railings at Home none  -AW        Type of Financial/Environmental Concern none  -AW        Transportation Available car;family or friend will provide  -AW        Living Environment Comment na  -AW        Functional Level Prior    Ambulation  1-->assistive equipment  -AW       Transferring  0-->independent  -AW       Toileting  1-->assistive equipment  -AW       Bathing  0-->independent  -AW       Dressing  0-->independent  -AW       Eating  0-->independent  -AW       Communication  0-->understands/communicates without difficulty  -AW       Swallowing  0-->swallows foods/liquids without difficulty  -AW       Prior Functional Level Comment  na  -AW       Muscle Tone Assessment    Muscle Tone Assessment --  -AP        Right-Side Extremities Muscle Tone Assessment moderately decreased tone   right sided weakness  -AP          User Key  (r) = Recorded By, (t) = Taken By, (c) = Cosigned By    Initials Name Effective Dates    CAROLYNN Gould, PT 06/19/15 -     ST Alexandrea Harrison, OTR 02/20/17 -     AW Darshana Russell, JORGE 06/16/16 -     AB Shelly De La Cruz 05/02/16 -     AP Rehan Archer RN 06/16/16 -     RD Nhi Steinberg, MS CF-SLP 09/18/16 -            Occupational  Therapy Education     Title: PT OT SLP Therapies (Done)     Topic: Occupational Therapy (Done)     Point: ADL training (Done)    Description: Instruct learner(s) on proper safety adaptation and remediation techniques during self care or transfers.   Instruct in proper use of assistive devices.    Learning Progress Summary    Learner Readiness Method Response Comment Documented by Status   Patient Acceptance E,TB,D VU,DU role of OT, benefits of activity, transfers, LBD, balance safety ST 09/21/17 1400 Done               Point: Home exercise program (Done)    Description: Instruct learner(s) on appropriate technique for monitoring, assisting and/or progressing therapeutic exercises/activities.    Learning Progress Summary    Learner Readiness Method Response Comment Documented by Status   Patient Acceptance E,TB,D VU,DU role of OT, benefits of activity, transfers, LBD, balance safety ST 09/21/17 1400 Done               Point: Precautions (Done)    Description: Instruct learner(s) on prescribed precautions during self-care and functional transfers.    Learning Progress Summary    Learner Readiness Method Response Comment Documented by Status   Patient Acceptance E,TB,D VU,DU role of OT, benefits of activity, transfers, LBD, balance safety ST 09/21/17 1400 Done               Point: Body mechanics (Done)    Description: Instruct learner(s) on proper positioning and spine alignment during self-care, functional mobility activities and/or exercises.    Learning Progress Summary    Learner Readiness Method Response Comment Documented by Status   Patient Acceptance E,TB,D VU,DU role of OT, benefits of activity, transfers, LBD, balance safety ST 09/21/17 1400 Done                      User Key     Initials Effective Dates Name Provider Type Discipline    ST 02/20/17 -  Alexandrea Harrison OTR Occupational Therapist OT                  OT Recommendation and Plan  Anticipated Equipment Needs At Discharge:  (TBD)  Anticipated  Discharge Disposition: inpatient rehabilitation facility  Planned Therapy Interventions: activity intolerance, adaptive equipment training, ADL retraining, IADL retraining, balance training, bed mobility training, fine motor coordination training, home exercise program, strengthening, transfer training  Therapy Frequency: daily  Plan of Care Review  Plan Of Care Reviewed With: patient  Outcome Summary/Follow up Plan: Pt s/p CVA & now with R UE weakness and dec. balance along w/ mildly slurred speech. These deficits impact safety and ability to complete daily tasks. Recommend IP rehab at d/c.           OT Goals       09/21/17 1401          Bed Mobility OT LTG    Bed Mobility OT LTG, Time to Achieve 5 - 7 days  -ST      Bed Mobility OT LTG, Activity Type supine to sit/sit to supine  -ST      Bed Mobility OT LTG, [place] Level supervision required  -ST      Bed Mobility OT LTG, Assist Device bed rails  -ST      Bed Mobility OT LTG, Outcome goal ongoing  -ST      Transfer Training OT LTG    Transfer Training OT LTG, Time to Achieve 5 - 7 days  -ST      Transfer Training OT LTG, Activity Type toilet;bed to chair /chair to bed  -ST      Transfer Training OT LTG, [place] Level supervision required  -ST      Transfer Training OT LTG, Assist Device walker, rolling  -ST      Transfer Training OT LTG, Outcome goal ongoing  -ST      Strength OT LTG    Strength Goal OT LTG, Time to Achieve 5 - 7 days  -ST      Strength Goal OT LTG, Additional Goal pt to be independent with RUE strengthening and ROM HEP to promote increased independence and safety with daily tasks by d/c.   -ST      Strength Goal OT LTG, Outcome goal ongoing  -ST      Toileting OT LTG    Toileting Goal OT LTG, Time to Achieve 5 - 7 days  -ST      Toileting Goal OT LTG, [place] Level supervision required  -ST      Toileting Goal OT LTG, Additional Goal toileting and hygiene   -ST      Toileting Goal OT LTG, Outcome goal ongoing  -ST        User Key   (r) = Recorded By, (t) = Taken By, (c) = Cosigned By    Initials Name Provider Type    HAI Ray Occupational Therapist                Outcome Measures       09/21/17 1310          How much help from another is currently needed...    Putting on and taking off regular lower body clothing? 3  -ST      Bathing (including washing, rinsing, and drying) 3  -ST      Toileting (which includes using toilet bed pan or urinal) 3  -ST      Putting on and taking off regular upper body clothing 3  -ST      Taking care of personal grooming (such as brushing teeth) 3  -ST      Eating meals 3  -ST      Score 18  -ST      Modified Treasure Scale    Modified Treasure Scale 4 - Moderately severe disability.  Unable to walk without assistance, and unable to attend to own bodily needs without assistance.  -ST      Functional Assessment    Outcome Measure Options AM-PAC 6 Clicks Daily Activity (OT);Modified Treasure  -ST        User Key  (r) = Recorded By, (t) = Taken By, (c) = Cosigned By    Initials Name Provider Type    HAI Ray Occupational Therapist          Time Calculation:   OT Start Time: 1310    Therapy Charges for Today     Code Description Service Date Service Provider Modifiers Qty    [id number]  OT EVAL MOD COMPLEXITY 4 9/21/2017 HAI Molina GO 1               HAI Ferrer  9/21/2017

## 2017-09-21 NOTE — PROGRESS NOTES
Continued Stay Note  [facility]     Patient Name: Romy Arias  MRN: [id number]  Today's Date: 9/21/2017    Admit Date: 9/20/2017          Discharge Plan       09/21/17 1602    Case Management/Social Work Plan    Plan Rehab    Patient/Family In Agreement With Plan yes    Additional Comments Spoke with pt at  regarding PT's recommendations. Pt is agreeable and would like a referral to [facility]. If [facility] cannot take pt she would like a referral to the swing bed unit at [facility]. Called referral and left vm to Jumana at [facility].               Discharge Codes     None        Expected Discharge Date and Time     Expected Discharge Date Expected Discharge Time    Sep 23, 2017             Shelly De La Cruz

## 2017-09-21 NOTE — MBS/VFSS/FEES
Acute Care - Speech Language Pathology Treatment Note  [facility]     Patient Name: Romy Arias  : 1945  MRN: [id number]  Today's Date: 2017         Admit Date: 2017    Visit Dx:      ICD-10-CM ICD-9-CM   1. Dysphagia, unspecified R13.10 787.20   2. Cognitive communication deficit R41.841 799.52   3. Impaired mobility and ADLs Z74.09 799.89   4. Impaired functional mobility, balance, gait, and endurance Z74.09 V49.89   5. Oropharyngeal dysphagia R13.12 787.22     Patient Active Problem List   Diagnosis   • Chronic pain   • Dementia   • Polyneuropathy   • Peripheral vascular disease   • Acid reflux   • Chronic osteomyelitis   • Depression   • Diverticulosis   • Emphysema/COPD   • Hypertension   • Diabetes mellitus type 2, uncontrolled, with complications   • CVA (cerebral vascular accident)              Adult Rehabilitation Note       17 1330          Improve attention    Improve attention by: complete sustained attention task;100%;without cues  -RD      Status: Improve attention Progressing as expected  -RD      Attention Progress 30%;with consistent cues;continue to address  -RD      Comments: Improve attention Pt w/ poor attention to task, easily distractible, required consistent cues to re-direct.  -RD      Recorded by [RD] Nhi Steinberg MS CF-SLP      Improve memory skills    Improve memory skills through: recalling related word lists immediately;recalling unrelated word lists immediately;repeat sentence;80%;without cues  -RD      Status: Improve memory skills Progressing as expected  -RD      Memory Skills Progress 30%;with consistent cues;continue to address  -RD      Recorded by [RD] Nhi Steinberg MS CF-SLP      Improve functional problem solving    Improve functional problem solving through: complete high level reasoning task;80%;without cues  -RD      Status: Improve functional problem solving New  -RD      Functional Problem Solving Progress continue to address  -RD       Recorded by [RD] Nhi Steinberg MS CF-SLP      Improve articulation    Improve articulation: of specific sounds in phrases;of specific sounds in connected speech;by over-articulating at phrase level;by over-articulating in connected speech;90%;without cues  -RD      Status: Improve articulation Progressing as expected  -RD      Articulation Progress 70%;with inconsistent cues;continue to address  -RD      Comments: Improve articulation Dysarthria significantly improved. Taught strategies for overarticulation.   -RD      Recorded by [RD] Nhi Steinberg MS CF-SLP        User Key  (r) = Recorded By, (t) = Taken By, (c) = Cosigned By    Initials Name Effective Dates    RD hNi Steinberg MS CF-SLP 09/18/16 -               IP SLP Goals       09/21/17 1729 09/20/17 1606       Begin to Take Some PO Safely    Begin to Take Some PO Safely- SLP, Date Established 09/21/17  -RD      Begin to Take Some PO Safely- SLP, Time to Achieve by discharge  -RD      Begin to Take Some PO Safely- SLP, Date Goal Reviewed 09/21/17  -RD      Begin to Take Some PO Safely- SLP, Outcome goal ongoing  -RD      Cognitive Linguistic- Optimal Participation in Care    Cognitive Linguistic Optimal Participation in Care- SLP, Date Established  09/20/17  -RD     Cognitive Linguistic Optimal Participation in Care- SLP, Time to Achieve  by discharge  -RD     Cognitive Linguistic Optimal Participation in Care- SLP, Date Goal Reviewed 09/21/17  -RD 09/20/17  -RD     Cognitive Linguistic Optimal Participation in Care- SLP, Outcome goal ongoing  -RD goal ongoing  -RD     Dysarthria- Optimal Particpation in Care    Dysarthria Optimal Participation in Care- SLP, Date Established  09/20/17  -RD     Dysarthria Optimal Participation in Care- SLP, Time to Achieve  by discharge  -RD     Dysarthria Optimal Participation in Care- SLP, Date Goal Reviewed 09/21/17  -RD 09/20/17  -RD     Dysarthria Optimal Participation in Care- SLP, Outcome goal ongoing   -RD goal ongoing  -RD       User Key  (r) = Recorded By, (t) = Taken By, (c) = Cosigned By    Initials Name Provider Type    BEBETO Steinberg, MS CF-SLP Speech and Language Pathologist          EDUCATION  The patient has been educated in the following areas:   Cognitive Impairment Communication Impairment.    SLP Recommendation and Plan  SLP Diagnosis: Mild dysarthria c/b imprecise articulation. Speech was 90% intelligible in conversation. Mild cognitive-linguistic deficit c/b difficulty w/ immediate memory recall, attention, and high level reasoning/problem solving. Pt reports that these deficits are not at baseline and new since this admission. Expressive and receptive language, reading, and writing were all found to be WFL.      Rehab Potential: good, to achieve stated therapy goals  Criteria for Skilled Therapeutic Interventions Met: skilled criteria for cognitive linguistic intervention met, skilled criteria for speech language intervention met        Therapy Frequency: 5 times/wk  Predicted Duration of Therapy Intervention (days/wks): until discharge       Plan of Care Review  Plan Of Care Reviewed With: patient  Progress:  (BS re-eval; S/L tx; FEES)  Outcome Summary/Follow up Plan: Repeat clinical dysphagia eval complete. R labial droop. Trials of thins and nectar-thick via tsp, cup, and straw, pudding, and solid consistencies given. Overt s/s of aspiration c/b throat clearing w/ thins and nectar-thick via straw. Incr'd prep and lingual residue w/ solid. RECS: NPO, meds alt route, FEES today. FEES complete. Pt noted w/ asymmetrical posterior pharyngeal wall w/ protrusion on L side of pharynx that did not appear to be edematous. Pt presents w/ severe oropharyngeal dysphagia. Trials of thins via tsp, and cup, nectar-thick via tsp, cup, and straw, pudding, and solid consistencies given. Swallow initiation was delayed to the lateral channels w/ thins and nectar and to the valleculae w/ pudding. Penetration  observed before the swallow over the posterior portion of the epiglottis w/ resulting aspiration during the swallow w/ both thins and nectar-thick liquids 2' impaired timing. Suspect aspiration occurred during the swallow w/ pudding consistency, as significant residue was observed in the laryngeal vestibule and below the vocal folds after the swallow due to decr'd vestibular closure. Pt was unable to completely clear residue in vestibule, even w/ cued cough and multiple swallows. Incr'd prep and oral transit of solid cracker w/ penetration before and resulting aspiration during the swallow upon presentation of nectar-thick liquid wash. Moderate residue was observed inconsistently on the posterior commissure w/ pudding  that pt was at risk of aspirating after the swallow due to decr'd excursion. Otherwise, vallecuar and pyriform sinus residue was mild w/ all consistencies. All aspiration on this exam was SILENT. Pt would benefit from temporary means of alt nutrition while completing dysphagia exercise program per MD discretion, although pt did not initially want keofeed per MD note. SLP will provide comfort diet recs depending on POC. RECS: Safest-NPO, meds alt route, temporary alt nutrition per MD discretion, initiate dysphagia tx. If pt wants comfort diet then: Nectar-thick liquids (no straws), Level [facility], meds crushed in pureed.  S/L tx complete. Pt w/ decr'd attention to task, required frequent cues to re-direct. Taught overarticulation strategy for dysarthria, which has significantly improved since initial eval. RECS: Cont S/L tx.           Time Calculation:         Time Calculation- SLP       09/21/17 1740          Time Calculation- SLP    SLP Start Time 1330  -RD      SLP Received On 09/21/17  -RD        User Key  (r) = Recorded By, (t) = Taken By, (c) = Cosigned By    Initials Name Provider Type    BEBETO Steinberg MS CF-SLP Speech and Language Pathologist          Therapy Charges  for Today     Code Description Service Date Service Provider Modifiers Qty    31343813617 HC ST EVAL ORAL PHARYNG SWALLOW 3 2017 Nhi Steinberg, MS CF-SLP GN 1    64134994754 HC ST EVAL SPEECH AND PROD W LANG  4 2017 Nhi Steinberg MS CF-SLP GN 1    [id number] HC ST EVAL ORAL PHARYNG SWALLOW 4 2017 Nhi Steinberg, MS CF-SLP GN 1    [id number] HC ST TREATMENT SPEECH 2 2017 Nhi Steinberg, MS CF-SLP GN 1    [id number] HC ST FIBEROPTIC ENDO EVAL SWALL 8 2017 Nhi Steinberg, MS CF-SLP GN 1               Nhi Steinberg, MS CF-SLP  2017 and Acute Care - Speech Language Pathology   Swallow Initial Evaluation [facility]   Fiberoptic Endoscopic Evaluation of Swallowing (FEES)  Clinical Swallow Evaluation     Patient Name: Romy Arias  : 1945  MRN: [id number]  Today's Date: 2017  Onset of Illness/Injury or Date of Surgery Date: 17            Admit Date: 2017    Visit Dx:     ICD-10-CM ICD-9-CM   1. Dysphagia, unspecified R13.10 787.20   2. Cognitive communication deficit R41.841 799.52   3. Impaired mobility and ADLs Z74.09 799.89   4. Impaired functional mobility, balance, gait, and endurance Z74.09 V49.89   5. Oropharyngeal dysphagia R13.12 787.22     Patient Active Problem List   Diagnosis   • Chronic pain   • Dementia   • Polyneuropathy   • Peripheral vascular disease   • Acid reflux   • Chronic osteomyelitis   • Depression   • Diverticulosis   • Emphysema/COPD   • Hypertension   • Diabetes mellitus type 2, uncontrolled, with complications   • CVA (cerebral vascular accident)     Past Medical History:   Diagnosis Date   • Arthritis    • Cancer    • Colon polyps    • Gallstone    • Hypertension    • Rheumatic fever    • Sepsis due to urinary tract infection    • Stroke      Past Surgical History:   Procedure Laterality Date   • BLADDER SURGERY     • GALLBLADDER SURGERY     • HYSTERECTOMY      JUSTEN   • THYROID SURGERY            SWALLOW  EVALUATION (last 72 hours)      Swallow Evaluation       09/21/17 1330 09/20/17 1415             Rehab Evaluation    Document Type evaluation  -RD        Subjective Information agree to therapy;complains of   leg discomfort  -RD        Patient Effort, Rehab Treatment good  -RD        General Information    Patient Profile Review yes  -RD        Onset of Illness/Injury 09/20/17  -RD        Subjective Patient Observations alert, cooperative  -RD        Pertinent History Of Current Problem   Adm w/ CVA, R wkns. MRI reveals small L pontine infarct.  Hx of DM, GERD, dementia. Failed RN dysphagia screen. Per stroke protocol.    -RD        Current Diet Limitations NPO  -RD        Precautions/Limitations, Vision WFL with corrective lenses  -RD        Precautions/Limitations, Hearing WFL  -RD        Prior Level of Function- Communication other (comment)   baseline dementia per chart  -RD        Prior Level of Function- Swallowing safe, efficient swallowing in all situations  -RD        Plans/Goals Discussed With patient;family;agreed upon  -RD        Barriers to Rehab cognitive status  -RD        Clinical Impression    Patient's Goals For Discharge return to PO diet  -RD        Family Goals For Discharge patient able to return to PO diet  -RD        SLP Swallowing Diagnosis severe dysphagia;oral dysfunction;pharyngeal dysfunction  -RD other (see comments)   r/o pharyngeal dysphagia  -RD       Rehab Potential/Prognosis, Swallowing good, to achieve stated therapy goals  -RD good, to achieve stated therapy goals  -RD       Criteria for Skilled Therapeutic Interventions Met skilled criteria for dysphagia intervention met  -RD skilled criteria for dysphagia intervention met  -RD       FCM, Swallowing 1-->Level 1  -RD        Therapy Frequency 5 times/wk  -RD evaluation only;PRN  -RD       Predicted Duration Therapy Interv (days) until discharge  -RD until discharge  -RD       SLP Diet Recommendation NPO: unsafe for food/liquid  intake;temporary alternative means of nutrition/hydration  -RD NPO: unsafe for food/liquid intake  -RD       Recommended Diagnostics FEES  -RD reassess via clinical swallow (non-instrumental exam)  -RD       SLP Rec. for Method of Medication Administration meds via alternate route  -RD meds via alternate route  -RD       Pain Assessment    Pain Assessment Allen-Solo FACES  -RD        Allen-Baker FACES Pain Rating 4  -RD        Oral Motor Structure and Function    Oral Motor Anatomy and Physiology patient demonstrates anatomy that is WNL  -RD patient demonstrates anatomy that is WNL  -RD       Dentition Assessment edentulous, does not have dentures  -RD edentulous, does not have dentures  -RD       Secretion Management WNL/WFL  -RD WNL/WFL  -RD       Mucosal Quality dry  -RD dry  -RD       Volitional Swallow mild to moderate difficulties initiating volitional swallow  -RD no difficulties initiating volitional swallow  -RD       Volitional Cough weak volitional cough  -RD weak volitional cough  -RD       Oral Musculature General Assessment oral labial impairment;buccal impairment;lingual impairment  -RD oral labial impairment  -RD       Oral Labial Strength and Mobility right labial droop  -RD right labial droop  -RD       Lingual Strength and Mobility reduced strength bilaterally  -RD        Buccal Strength and Mobility reduced strength bilaterally;reduced ROM  -RD        General Feeding/Swallowing Observations    Current Feeding Method NPO  -RD NPO  -RD       Clinical Swallow Exam    Mode of Presentation fed by clinician;spoon;cup;straw  -RD fed by clinician;self fed;spoon;cup;straw  -RD       Oral Preparation Concerns cohesive solid:;increased prep time;incomplete bolus preparation  -RD other (see comments)   DNT solid  -RD       Oral Transit Concerns cohesive solid:;increased oral transit time  -RD        Oral Residue cohesive solid:;tongue residue  -RD        Oral Phase Results impaired oral phase, signs of  dysfunction present  -RD impaired oral phase, signs of dysfunction present  -RD       Pharyngeal Phase Results sign/symptoms of pharyngeal impairment;throat clear  -RD sign/symptoms of pharyngeal impairment;cough;throat clear  -RD       Summary of Clinical Exam Repeat clinical dysphagia eval complete. R labial droop. Trials of thins and nectar-thick via tsp, cup, and straw, pudding, and solid consistencies given. Overt s/s of aspiration c/b throat clearing w/ thins and nectar-thick via straw. Incr'd prep and lingual residue w/ solid. RECS: NPO, meds alt route, FEES today.   -RD Clinical dysphagia eval complete. R labial droop. Trials of thins via tsp and cup, and pureed consistencies given. Overt s/s of aspiration c/b immediate coughing/choking w/ thins via cup and pureed consistencies. Pt not ready for PO given severity of s/s of aspiration. Will f/u tomorrow for repeat BS eval to determine instrumental readiness. RECS: NPO, meds alt route, BS re-eval for instrumental readiness.   -RD       Fiberoptic Endoscopic Swallowing Exam    Risks/Benefits Reviewed risks/benefits explained;agreed to eval;patient;family  -RD        Nasal Entry, Topical Anesthetic left:;nostril entered using no topical anesthetic  -RD        Anatomy and Physiology    Velopharyngeal WFL  -RD        Base of Tongue symmetrical;range reduced  -RD        Epiglottis WFL  -RD        Laryngeal Function Breathing symmetrical  -RD        Laryngeal Function Phonation symmetrical  -RD        Laryngeal Function Breath Hold TVT/FVF/Arytenio  -RD        Secretions 0- Normal, no visible secretions  -RD        Secretion Description thin;clear  -RD        Ice Chips DNA  -RD        Spontaneous Swallow frequency reduced  -RD        Sensory senses scope  -RD        Oral-Phary Transit increased prep time;increased transit time  -RD        Anatomy Hypopharynx    Observation Anatomic Considerations anatomic deviation observed  -RD        Observations Comment Pt noted  w/ asymmetry of pharynx, L side notable protruded, does not appear to be edema.   -RD        FEES    Mode of Presentation thin:;nectar:;pudding:;cohesive solid:;fed by clinician;spoon;cup;straw  -RD        Pharyngeal Phase Impairment thin:;nectar:;impaired timing with penetration before;impaired timing with aspiration during;pudding:;aspiration during;reduced closure vestibule;tongue base retraction reduced;reduced anterior hyolaryngeal excursion  -RD        Post Swallow Residue thin:;nectar:;pudding:;residue present pyriform sinuses;residue present in valleculae;clears all residue with cue  -RD        Rosenbek's Scale thin:;nectar:;pudding:;8-->Level 8  -RD        Response to Aspiration absent response, silent aspiration;productive volitional cough following clinician cue  -RD        Attempted Compensatory Maneuvers bolus size;bolus presentation style  -RD        Pharyngeal Phase Results impaired pharyngeal phase of swallowing  -RD        FEES Summary FEES complete. Pt noted w/ asymmetrical posterior pharyngeal wall w/ protrusion on L side of pharynx that did not appear to be edematous. Pt presents w/ severe oropharyngeal dysphagia. Trials of thins via tsp, and cup, nectar-thick via tsp, cup, and straw, pudding, and solid consistencies given. Swallow initiation was delayed to the lateral channels w/ thins and nectar and to the valleculae w/ pudding. Penetration observed before the swallow over the posterior portion of the epiglottis w/ resulting aspiration during the swallow w/ both thins and nectar-thick liquids 2' impaired timing. Suspect aspiration occurred during the swallow w/ pudding consistency, as significant residue was observed in the laryngeal vestibule and below the vocal folds after the swallow due to decr'd vestibular closure. Pt was unable to completely clear residue in vestibule, even w/ cued cough and multiple swallows. Incr'd prep and oral transit of solid cracker w/ penetration before and  resulting aspiration during the swallow upon presentation of nectar-thick liquid wash. Moderate residue was observed inconsistently on the posterior commissure w/ pudding  that pt was at risk of aspirating after the swallow due to decr'd excursion. Otherwise, vallecuar and pyriform sinus residue was mild w/ all consistencies. All aspiration on this exam was SILENT. Pt would benefit from temporary means of alt nutrition while completing dysphagia exercise program per MD discretion, although pt did not initially want keofeed per MD note. SLP will provide comfort diet recs depending on POC. RECS: Safest-NPO, meds alt route, temporary alt nutrition per MD discretion, initiate dysphagia tx. If pt wants comfort diet then: Nectar-thick liquids (no straws), Level 4 dysphagia diet-mech soft, meds crushed in pureed.   -RD        FEES Swallow Recommendations    Oral Care oral care with toothbrush and dentifrice BID and PRN  -RD        Recommended Diet NPO: unsafe for food/liquid intake;temporary alternative means of nutrition/hydration  -RD        Swallow Recommendations    Oral Care  oral care with toothbrush and dentifrice BID and PRN  -RD       Other Recommendations  repeat clincial exam  -RD       Recommended Diet  NPO: unsafe for food/liquid intake  -RD       Improve oral skills    To Improve Oral Skills, patient will: Increase lip closure;Increase tongue A-P movement;Increase back of tongue control;90%;without cues  -RD        Status: Improve Oral Skills New  -RD        Oral Skills Progress continue to adress  -RD        Improve timing of pharyngeal response    To improve timing of pharyngeal response, patient will: Swallow in timely way using suck-swallow response;Swallow in timely way using three second prep;80%;without cues  -RD        Status: Improve timing of pharyngeal response New  -RD        Timing of Pharyngeal Response Progress continue to adress  -RD        Improve closure at entrance to airway    To improve  closure at entrance to airway, patient will: Complete super-supraglottic swallow;80%;without cues  -RD        Status: Improve closure at entrance to airway New  -RD        Closure at Entrance to Airway Progress continue to adress  -RD        Improve hyolaryngeal excursion    To improve hyolaryngeal excursion, patient will: Complete chin tuck against resistance (comment number of repetitions);80%;without cues  -RD        Status: Improve hyolaryngeal excursion New  -RD        Hyolaryngeal Excursion Progress continue to adress  -RD        Improve tongue base & pharyngeal wall squeeze    To improve tongue base & pharyngeal wall squeeze, patient will: Complete effortful swallow;80%;without cues  -RD        Status: Improve tongue base & pharyngeal wall squeeze New  -RD        Tongue Base/Pharyngeal Wall Squeeze Progress continue to adress  -RD          User Key  (r) = Recorded By, (t) = Taken By, (c) = Cosigned By    Initials Name Effective Dates    BEBETO Steinberg MS CF-SLP 09/18/16 -         EDUCATION  The patient has been educated in the following areas:   Dysphagia (Swallowing Impairment) Oral Care/Hydration NPO rationale.    SLP Recommendation and Plan  SLP Swallowing Diagnosis: severe dysphagia, oral dysfunction, pharyngeal dysfunction  SLP Diet Recommendation: NPO: unsafe for food/liquid intake, temporary alternative means of nutrition/hydration     SLP Rec. for Method of Medication Administration: meds via alternate route     Recommended Diagnostics: FEES  Criteria for Skilled Therapeutic Interventions Met: skilled criteria for dysphagia intervention met     Rehab Potential/Prognosis, Swallowing: good, to achieve stated therapy goals  Therapy Frequency: 5 times/wk             Plan of Care Review  Plan Of Care Reviewed With: patient  Progress:  (BS re-eval; S/L tx; FEES)  Outcome Summary/Follow up Plan: Repeat clinical dysphagia eval complete. R labial droop. Trials of thins and nectar-thick via tsp, cup, and  straw, pudding, and solid consistencies given. Overt s/s of aspiration c/b throat clearing w/ thins and nectar-thick via straw. Incr'd prep and lingual residue w/ solid. RECS: NPO, meds alt route, FEES today. FEES complete. Pt noted w/ asymmetrical posterior pharyngeal wall w/ protrusion on L side of pharynx that did not appear to be edematous. Pt presents w/ severe oropharyngeal dysphagia. Trials of thins via tsp, and cup, nectar-thick via tsp, cup, and straw, pudding, and solid consistencies given. Swallow initiation was delayed to the lateral channels w/ thins and nectar and to the valleculae w/ pudding. Penetration observed before the swallow over the posterior portion of the epiglottis w/ resulting aspiration during the swallow w/ both thins and nectar-thick liquids 2' impaired timing. Suspect aspiration occurred during the swallow w/ pudding consistency, as significant residue was observed in the laryngeal vestibule and below the vocal folds after the swallow due to decr'd vestibular closure. Pt was unable to completely clear residue in vestibule, even w/ cued cough and multiple swallows. Incr'd prep and oral transit of solid cracker w/ penetration before and resulting aspiration during the swallow upon presentation of nectar-thick liquid wash. Moderate residue was observed inconsistently on the posterior commissure w/ pudding  that pt was at risk of aspirating after the swallow due to decr'd excursion. Otherwise, vallecuar and pyriform sinus residue was mild w/ all consistencies. All aspiration on this exam was SILENT. Pt would benefit from temporary means of alt nutrition while completing dysphagia exercise program per MD discretion, although pt did not initially want keofeed per MD note. SLP will provide comfort diet recs depending on POC. RECS: Safest-NPO, meds alt route, temporary alt nutrition per MD discretion, initiate dysphagia tx. If pt wants comfort diet then: Nectar-thick liquids (no straws), Level  4 dysphagia diet-mech soft, meds crushed in pureed.  S/L tx complete. Pt w/ decr'd attention to task, required frequent cues to re-direct. Taught overarticulation strategy for dysarthria, which has significantly improved since initial eval. RECS: Cont S/L tx.           IP SLP Goals       09/21/17 1729 09/20/17 1606       Begin to Take Some PO Safely    Begin to Take Some PO Safely- SLP, Date Established 09/21/17  -RD      Begin to Take Some PO Safely- SLP, Time to Achieve by discharge  -RD      Begin to Take Some PO Safely- SLP, Date Goal Reviewed 09/21/17  -RD      Begin to Take Some PO Safely- SLP, Outcome goal ongoing  -RD      Cognitive Linguistic- Optimal Participation in Care    Cognitive Linguistic Optimal Participation in Care- SLP, Date Established  09/20/17  -RD     Cognitive Linguistic Optimal Participation in Care- SLP, Time to Achieve  by discharge  -RD     Cognitive Linguistic Optimal Participation in Care- SLP, Date Goal Reviewed 09/21/17  -RD 09/20/17  -RD     Cognitive Linguistic Optimal Participation in Care- SLP, Outcome goal ongoing  -RD goal ongoing  -RD     Dysarthria- Optimal Particpation in Care    Dysarthria Optimal Participation in Care- SLP, Date Established  09/20/17  -RD     Dysarthria Optimal Participation in Care- SLP, Time to Achieve  by discharge  -RD     Dysarthria Optimal Participation in Care- SLP, Date Goal Reviewed 09/21/17  -RD 09/20/17  -RD     Dysarthria Optimal Participation in Care- SLP, Outcome goal ongoing  -RD goal ongoing  -RD       User Key  (r) = Recorded By, (t) = Taken By, (c) = Cosigned By    Initials Name Provider Type    BEBETO Steinberg MS CF-SLP Speech and Language Pathologist               Time Calculation:         Time Calculation- SLP       09/21/17 1740          Time Calculation- SLP    SLP Start Time 1330  -RD      SLP Received On 09/21/17  -RD        User Key  (r) = Recorded By, (t) = Taken By, (c) = Cosigned By    Initials Name Provider Type    BEBETO  Nhi Steinberg, MS CF-SLP Speech and Language Pathologist          Therapy Charges for Today     Code Description Service Date Service Provider Modifiers Qty    80568736437 HC ST EVAL ORAL PHARYNG SWALLOW 3 9/20/2017 Nhi Steinberg, MS CF-SLP GN 1    17168665846 HC ST EVAL SPEECH AND PROD W LANG  4 9/20/2017 Nhi Steinberg, MS CF-SLP GN 1    48968212624 HC ST EVAL ORAL PHARYNG SWALLOW 4 9/21/2017 Nhi Steinberg, MS CF-SLP GN 1    20506028310 HC ST TREATMENT SPEECH 2 9/21/2017 Nhi Steinberg, MS CF-SLP GN 1    [id number]  ST FIBEROPTIC ENDO EVAL SWALL 8 9/21/2017 Nhi Steinberg, MS CF-SLP GN 1               Nhi Steinberg, MS CF-SLP  9/21/2017

## 2017-09-21 NOTE — THERAPY EVALUATION
Acute Care - Physical Therapy Initial Evaluation  [facility]     Patient Name: Romy Arias  : 1945  MRN: [id number]  Today's Date: 2017   Onset of Illness/Injury or Date of Surgery Date: 17  Date of Referral to PT: 17  Referring Physician: JJ MANRIQUE      Admit Date: 2017     Visit Dx:    ICD-10-CM ICD-9-CM   1. Dysphagia, unspecified R13.10 787.20   2. Cognitive communication deficit R41.841 799.52   3. Impaired mobility and ADLs Z74.09 799.89   4. Impaired functional mobility, balance, gait, and endurance Z74.09 V49.89     Patient Active Problem List   Diagnosis   • Chronic pain   • Dementia   • Polyneuropathy   • Peripheral vascular disease   • Acid reflux   • Chronic osteomyelitis   • Depression   • Diverticulosis   • Emphysema/COPD   • Hypertension   • Diabetes mellitus type 2, uncontrolled, with complications   • CVA (cerebral vascular accident)     Past Medical History:   Diagnosis Date   • Arthritis    • Cancer    • Colon polyps    • Gallstone    • Hypertension    • Rheumatic fever    • Sepsis due to urinary tract infection    • Stroke      Past Surgical History:   Procedure Laterality Date   • BLADDER SURGERY     • GALLBLADDER SURGERY     • HYSTERECTOMY      JUSTEN   • THYROID SURGERY            PT ASSESSMENT (last 72 hours)      PT Evaluation       17 1315 17 1310    Rehab Evaluation    Document Type evaluation   COMPLETED CHART REVIEW [id number]. CO-RX WITH O.T.   -CD evaluation  -ST    Subjective Information agree to therapy;complains of;pain   CHRONIC NEUROPATHIC PAIN B FEET.   -CD no complaints;agree to therapy  -ST    Patient Effort, Rehab Treatment good  -CD good  -ST    Symptoms Noted During/After Treatment fatigue;increased pain  -CD none  -ST    General Information    Patient Profile Review yes  -CD yes  -ST    Onset of Illness/Injury or Date of Surgery Date 17  -CD 17  -ST    Referring Physician JJ MANRIQUE  -JJ Dai  -     General Observations PT SUPINE UPON ARRIVAL ON RA. SON PRESENT.   -CD pt supine upon arrival; family present; IV intact  -ST    Pertinent History Of Current Problem TO OSH WITH ACUTE ONSET OF SLURRED SPEECH, R HP. TRANSFERRED TO [facility]. CT NEG, MRI REVEALED LACUNAR INFARCT L ROX. PER CHART PT HAS CHRONIC OSTEOMYELTIS R FIRST TOE. PT IS CURRENTLY NPO.   -CD Pt with sudden onset of R HP and slurred speech; To OSH originally then moved to [facility] for higher level of care. Likely lacunar infarct   -ST    Precautions/Limitations fall precautions;insensate limb  -CD fall precautions;other (see comments)   chronic neuropathy B feet w/no sensation, R UE weakness  -ST    Prior Level of Function independent:;all household mobility;community mobility;ADL's;driving  -CD independent:;all household mobility;community mobility;transfer;bed mobility;ADL's;feeding;grooming;dressing;bathing;home management;cooking;cleaning;driving;shopping  -ST    Equipment Currently Used at Home cane, straight;commode  -CD cane, straight;commode  -ST    Plans/Goals Discussed With patient and family;agreed upon  -CD patient;agreed upon  -ST    Risks Reviewed patient and family:;LOB;change in vital signs;increased discomfort  -CD patient:;LOB;nausea/vomiting;dizziness;increased discomfort;change in vital signs  -ST    Benefits Reviewed patient and family:;improve function;increase independence;increase strength;increase balance;decrease pain;increase knowledge  -CD patient:;improve function;increase independence;increase strength;increase balance;decrease pain;increase knowledge  -ST    Barriers to Rehab medically complex  -CD medically complex  -ST    Living Environment    Lives With alone   FAMILY AVAILABLE PRN.   -CD alone  -ST    Living Arrangements house  -CD house  -ST    Home Accessibility stairs to enter home;bed and bath on same level;tub/shower is not walk in  -CD no concerns;bed and bath on same level;stairs to enter home;tub/shower is not walk  in  -ST    Number of Stairs to Enter Home 2  -CD 2  -ST    Stair Railings at Home none  -CD none  -ST    Type of Financial/Environmental Concern  none  -ST    Living Environment Comment WILL LIKELY NEED IRF AT D/C.   -CD pt lives alone however has family available and close by to assist   -ST    Clinical Impression    Date of Referral to PT 09/20/17  -CD     PT Diagnosis IMPAIRED FUNCTIONAL MOBILITY, CVA.   -CD     Patient/Family Goals Statement DID NOT STATE. SON WOULD LIKE IRF.   -CD     Criteria for Skilled Therapeutic Interventions Met yes  -CD     Rehab Potential good, to achieve stated therapy goals  -CD     Vital Signs    Pre Systolic BP Rehab --   VITALS PER O.T. NOTE.   -  -ST    Pre Treatment Diastolic BP  79  -ST    Posttreatment Heart Rate (beats/min)  --   71  -ST    Pre Patient Position  Sitting  -ST    Post Patient Position  Sitting  -ST    Pain Assessment    Pain Assessment 0-10  -CD [place] FACES  -ST    Allen-[place]olo FACES Pain Rating 4  -CD 4  -ST    Pain Score 10  -CD     Post Pain Score 10  -CD     Pain Type Chronic pain  -CD Chronic pain  -ST    Pain Location Foot  -CD Foot  -ST    Pain Orientation Right;Left  -CD     Pain Intervention(s) Repositioned;Ambulation/increased activity;Medication (See MAR)   NSG CALLED TO BRING PAIN MED.   -CD --   asked RN for pain meds  -ST    Vision Assessment/Intervention    Visual Impairment visual impairment, right  -CD WFL with corrective lenses  -ST    Visual Acuity Comment  dec. peripheral vision on R, tracking WFL   -ST    Vision Comment PER O.T. TRACKING. HAS GLASSESS.   -CD     Cognitive Assessment/Intervention    Current Cognitive/Communication Assessment functional  -CD functional  -ST    Orientation Status oriented to;person;place  -CD oriented to;person;place  -ST    Follows Commands/Answers Questions 100% of the time;able to follow single-step instructions;needs cueing  -% of the time  -ST    Personal Safety mild impairment;impulsive   -CD mild impairment  -ST    Personal Safety Interventions fall prevention program maintained;gait belt;muscle strengthening facilitated;nonskid shoes/slippers when out of bed;supervised activity  -CD fall prevention program maintained;gait belt;nonskid shoes/slippers when out of bed  -ST    ROM (Range of Motion)    General ROM lower extremity range of motion deficits identified   MILD DF LIMITATION B. OTHERWISE WFL'S   -CD     General ROM Detail  RUE shoulder flexion and abd impaired at baseline d/t shoulder injury/sx; LUE WFL  -ST    MMT (Manual Muscle Testing)    General MMT Assessment lower extremity strength deficits identified   GROSSLY 4/5 B LE.   -CD     General MMT Assessment Detail  R shoulder flexion n/t d/t pain w/ROM, flexion and extension elbow 4-/5,  ~3+/5  -ST    Bed Mobility, Assessment/Treatment    Bed Mobility, Assistive Device bed rails;head of bed elevated  -CD bed rails;head of bed elevated  -ST    Bed Mobility, Scoot/Bridge, [place]  supervision required  -    Bed Mob, Supine to Sit, [place] minimum assist (75% patient effort);2 person assist required;verbal cues required  -CD minimum assist (75% patient effort);2 person assist required  -ST    Bed Mobility, Safety Issues decreased use of arms for pushing/pulling;decreased use of legs for bridging/pushing  -CD decreased use of arms for pushing/pulling;decreased use of legs for bridging/pushing  -ST    Bed Mobility, Impairments strength decreased  -CD decreased flexibility;ROM decreased;strength decreased  -ST    Bed Mobility, Comment MIN ASSIST TO UPRIGHT TRUNK.   -CD increased time to complete, completed semi-log roll to side to grasp bed rail and assist with pushing up   -ST    Transfer Assessment/Treatment    Transfers, Sit-Stand [place] minimum assist (75% patient effort);2 person assist required;verbal cues required  -CD minimum assist (75% patient effort);2 person assist required  -ST    Transfers, Stand-Sit  [place] minimum assist (75% patient effort);verbal cues required  -CD minimum assist (75% patient effort);2 person assist required  -ST    Transfers, Sit-Stand-Sit, Assist Device --   VIA GAIT BELT AND B UE SUPPORT.   -CD     Gait Assessment/Treatment    Gait, [place] Level minimum assist (75% patient effort);2 person assist required;verbal cues required  -CD     Gait, Assistive Device --   VIA GAIT BELT AND B UE SUPPORT.   -CD     Gait, Distance (Feet) 62  -CD     Gait, Gait Deviations stride width increased;weight-shifting ability decreased;forward flexed posture;frannie decreased  -CD     Gait, Safety Issues weight-shifting ability decreased;step length decreased;balance decreased during turns  -CD     Gait, Impairments strength decreased;impaired balance;sensory feedback impaired  -CD     Gait, Comment NEEDS TO TRY R WALKER NEXT VISIT.   -CD     Motor Skills/Interventions    Additional Documentation Balance Skills Training (Group)  -CD Balance Skills Training (Group);Fine Motor Coordination Training (Group);Gross Motor Coordination Training (Group)  -ST    Balance Skills Training    Sitting-Level of Assistance Contact guard   PROGRESSED TO SBA. PT VERY FIDGETY IN SITTING, LEANING R/L.   -CD Contact guard   progression to SBA  -ST    Sitting-Balance Support Feet supported  -CD Feet supported  -ST    Standing-Level of Assistance Contact guard;x2  -CD Contact guard;x2  -ST    Static Standing Balance Support Right upper extremity supported;Left upper extremity supported  -CD Right upper extremity supported;Left upper extremity supported  -ST    Gait Balance-Level of Assistance Minimum assistance;x2  -CD Minimum assistance;x2  -ST    Gait Balance Support Right upper extremity supported;Left upper extremity supported  -CD Right upper extremity supported;Left upper extremity supported  -ST    Fine Motor Coordination Training    Opposition  Right:;Left:;intact  -ST    Gross Motor Coordination Training    Gross  Motor Skill, Impairments Detail  rapid alternating intact bilaterally   -ST    Sensory Assessment/Intervention    Light Touch LLE;RLE   ABSENT MID CALF DOWN. PT WITH PERIPHERAL NEUROPATHY.   -CD RUE  -ST    RUE Light Touch  mild impairment   numbness and tingling hand   -ST    Positioning and Restraints    Pre-Treatment Position in bed  -CD in bed  -ST    Post Treatment Position chair  -CD chair  -ST    In Chair notified nsg;call light within reach;encouraged to call for assist;reclined;exit alarm on;legs elevated;LUE elevated  -CD notified nsg;reclined;call light within reach;encouraged to call for assist;exit alarm on  -ST      09/21/17 1051 09/20/17 1415    Rehab Evaluation    Document Type  evaluation  -RD    Subjective Information  no complaints;agree to therapy  -RD    Patient Effort, Rehab Treatment  good  -RD    General Information    Equipment Currently Used at Home cane, straight;raised toilet  -AB     Living Environment    Lives With alone  -AB alone  -RD    Living Arrangements house   Lives in [facility]  -AB house  -RD    Home Accessibility no concerns;bed and bath on same level;stairs to enter home  -AB     Number of Stairs to Enter Home 2  -AB     Stair Railings at Home none  -AB     Type of Financial/Environmental Concern none  -AB     Transportation Available car;family or friend will provide  -AB     Pain Assessment    Pain Assessment  No/denies pain  -RD    Cognitive Assessment/Intervention    Current Cognitive/Communication Assessment  impaired  -RD    Orientation Status  oriented x 4  -RD    Follows Commands/Answers Questions  100% of the time;able to follow multi-step instructions  -RD    Short/Long Term Memory  mild impairment, short term memory  -RD    Additional Documentation  Cognitive Assessment Intervention (Group)  -RD    Cognitive Assessment Intervention    Behavior/Mood Observations  alert;cooperative;distractible  -RD    Attention  mild impairment  -RD    Pragmatics  WNL/WFL  -RD     Problem Solving  mild impairment  -RD    Executive Function Skills  mild impairment  -RD    Reasoning  mild impairment  -RD    Sequencing  mild impairment  -RD    Diffuse Language Characteristics  no concerns, diffuse language characteristics  -RD      09/20/17 1213 09/20/17 1204    General Information    Equipment Currently Used at Home  cane, straight;raised toilet  -AW    Living Environment    Lives With alone  -AW     Living Arrangements house  -AW     Home Accessibility no concerns  -AW     Stair Railings at Home none  -AW     Type of Financial/Environmental Concern none  -AW     Transportation Available car;family or friend will provide  -AW     Living Environment Comment na  -AW     Muscle Tone Assessment    Muscle Tone Assessment --  -AP     Right-Side Extremities Muscle Tone Assessment moderately decreased tone   right sided weakness  -AP       User Key  (r) = Recorded By, (t) = Taken By, (c) = Cosigned By    Initials Name Provider Type    CD Ifeoma Gould, PT Physical Therapist    ST Alexandrea Harrison, OTR Occupational Therapist    GILDARDO Russell, RN Registered Nurse    AB Shelly De La Cruz     AP Rehan Archer RN Registered Nurse    BEBETO Steinberg, MS CF-SLP Speech and Language Pathologist          Physical Therapy Education     Title: PT OT SLP Therapies (Done)     Topic: Physical Therapy (Done)     Point: Mobility training (Done)    Learning Progress Summary    Learner Readiness Method Response Comment Documented by Status   Patient Acceptance E VU,NR BENEFITS OF OOB ACTIVITY, SAFETY WITH MOBILITY, D/C PLANNING.  09/21/17 1411 Done               Point: Home exercise program (Done)    Learning Progress Summary    Learner Readiness Method Response Comment Documented by Status   Patient Acceptance E VU,NR BENEFITS OF OOB ACTIVITY, SAFETY WITH MOBILITY, D/C PLANNING.  09/21/17 1411 Done               Point: Body mechanics (Done)    Learning Progress Summary    Learner Readiness Method  Response Comment Documented by Status   Patient Acceptance E VU,NR BENEFITS OF OOB ACTIVITY, SAFETY WITH MOBILITY, D/C PLANNING.  09/21/17 1411 Done               Point: Precautions (Done)    Learning Progress Summary    Learner Readiness Method Response Comment Documented by Status   Patient Acceptance E VU,NR BENEFITS OF OOB ACTIVITY, SAFETY WITH MOBILITY, D/C PLANNING.  09/21/17 1411 Done                      User Key     Initials Effective Dates Name Provider Type Discipline     06/19/15 -  Ifeoma Gould, PT Physical Therapist PT                PT Recommendation and Plan  Anticipated Equipment Needs At Discharge: front wheeled walker (BATH BENCH)  Anticipated Discharge Disposition: inpatient rehabilitation facility  Planned Therapy Interventions: balance training, bed mobility training, gait training, home exercise program, strengthening, transfer training  PT Frequency: daily  Plan of Care Review  Plan Of Care Reviewed With: patient  Outcome Summary/Follow up Plan: PT PRESENST WITH EVOLVING SYMTPOMS S/P CVA TO INCLUDE, WEAKNESS, IMPAIRED BALANCE AND DECLINE IN FUNCITONAL MOBILITY. 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 RECOMMEND IRF AT D/C.           IP PT Goals       09/21/17 1411          Bed Mobility PT LTG    Bed Mobility PT LTG, Time to Achieve 2 wks  -CD      Bed Mobility PT LTG, Activity Type all bed mobility  -CD      Bed Mobility PT LTG, [place] Level independent  -CD      Transfer Training PT LTG    Transfer Training PT LTG, Time to Achieve 2 wks  -CD      Transfer Training PT LTG, Activity Type all transfers  -CD      Transfer Training PT LTG, [place] Level contact guard assist  -CD      Transfer Training PT LTG, Assist Device walker, rolling  -CD      Gait Training PT LTG    Gait Training Goal PT LTG, Time to Achieve 2 wks  -CD      Gait Training Goal PT LTG, [place] Level contact guard assist  -CD      Gait Training Goal PT LTG, Assist Device walker,  rolling  -CD      Gait Training Goal PT LTG, Distance to Achieve 250  -CD        User Key  (r) = Recorded By, (t) = Taken By, (c) = Cosigned By    Initials Name Provider Type    CD Ifeoma Gould, PT Physical Therapist                Outcome Measures       09/21/17 1310 09/21/17 1305       How much help from another person do you currently need...    Turning from your back to your side while in flat bed without using bedrails?  3  -CD     Moving from lying on back to sitting on the side of a flat bed without bedrails?  2  -CD     Moving to and from a bed to a chair (including a wheelchair)?  2  -CD     Standing up from a chair using your arms (e.g., wheelchair, bedside chair)?  2  -CD     Climbing 3-5 steps with a railing?  1  -CD     To walk in hospital room?  2  -CD     AM-PAC 6 Clicks Score  12  -CD     How much help from another is currently needed...    Putting on and taking off regular lower body clothing? 3  -ST      Bathing (including washing, rinsing, and drying) 3  -ST      Toileting (which includes using toilet bed pan or urinal) 3  -ST      Putting on and taking off regular upper body clothing 3  -ST      Taking care of personal grooming (such as brushing teeth) 3  -ST      Eating meals 3  -ST      Score 18  -ST      Modified Estella Scale    Modified Estella Scale 4 - Moderately severe disability.  Unable to walk without assistance, and unable to attend to own bodily needs without assistance.  -ST 4 - Moderately severe disability.  Unable to walk without assistance, and unable to attend to own bodily needs without assistance.  -CD     Functional Assessment    Outcome Measure Options AM-PAC 6 Clicks Daily Activity (OT);Modified [place]  -ST AM-[facility] 6 Clicks Basic Mobility (PT);Modified [place]ella  -CD       User Key  (r) = Recorded By, (t) = Taken By, (c) = Cosigned By    Initials Name Provider Type    CAROLYNN Gould, PT Physical Therapist    ST Alexandrea Harrison OTR Occupational Therapist           Time  Calculation:         PT Charges       09/21/17 1415          Time Calculation    Start Time 1315  -CD      PT Received On 09/21/17  -CD      PT Goal Re-Cert Due Date 10/01/17  -CD        User Key  (r) = Recorded By, (t) = Taken By, (c) = Cosigned By    Initials Name Provider Type    CD Ifeoma Gould, PT Physical Therapist          Therapy Charges for Today     Code Description Service Date Service Provider Modifiers Qty    65377698984 HC PT EVAL MOD COMPLEXITY 4 9/21/2017 Ifeoma Gould, PT GP 1          PT G-Codes  Outcome Measure Options: AM-PAC 6 Clicks Daily Activity (OT), Modified Estella Gould, PT  9/21/2017

## 2017-09-21 NOTE — PROGRESS NOTES
Discharge Planning Assessment  [facility]     Patient Name: Romy Arias  MRN: [id number]  Today's Date: 9/21/2017    Admit Date: 9/20/2017          Discharge Needs Assessment       09/21/17 1051    Living Environment    Lives With alone    Living Arrangements house   Lives in [facility] Accessibility no concerns;bed and bath on same level;stairs to enter home    Number of Stairs to Enter Home 2    Stair Railings at Home none    Type of Financial/Environmental Concern none    Transportation Available car;family or friend will provide    Living Environment    Provides Primary Care For no one    Quality Of Family Relationships supportive;helpful;involved   Son is at bedside and helpful    Able to Return to Prior Living Arrangements yes    Discharge Needs Assessment    Concerns To Be Addressed discharge planning concerns    Concerns Comments Pt unsure of her needs at this time    Anticipated Changes Related to Illness inability to care for self    Equipment Currently Used at Home cane, straight;raised toilet    Equipment Needed After Discharge walker, rolling    Discharge Facility/Level Of Care Needs home with home health;nursing facility, skilled    Current Discharge Risk lives alone    Discharge Disposition still a patient    Discharge Contact Information if Applicable [phone] or cell [phone]            Discharge Plan       09/21/17 1054    Case Management/Social Work Plan    Plan TBD    Patient/Family In Agreement With Plan yes    Additional Comments Referral received for stroke. Met with pt and her sons at . She lives alone and uses a cane. Pt was driving and independent. She feels she could benefit from a walker at home due to increased weakness. She does not have HH currently. She is agreeable to HH at d/c for PT with no preference. She is unsure at this time if she will need rehab. CM will wait for PT/OT eval for recommendations and equipment needs. She has Medicare A&B with [facility]  supplement with script coverage that is affordable. CM will follow.         Discharge Placement     No information found        Expected Discharge Date and Time     Expected Discharge Date Expected Discharge Time    Sep 23, 2017               Demographic Summary       09/21/17 1047    Referral Information    Referral Source admission list;physician   stroke    Contact Information    Permission Granted to Share Information With ;family/designee   Pt's benji Heath or Too    Primary Care Physician Information    Name Dr. Marina Bloom            Functional Status       09/21/17 1049    Functional Status Prior    Ambulation 1-->assistive equipment   Uses a rolling walker    Transferring 0-->independent    Toileting 1-->assistive equipment   Raised toilet    Bathing 0-->independent    Dressing 0-->independent    Eating 0-->independent    Communication 0-->understands/communicates without difficulty    Swallowing 0-->swallows foods/liquids without difficulty    IADL    Medications independent    Meal Preparation independent    Housekeeping independent    Laundry independent    Shopping independent    Oral Care independent    Activity Tolerance    Current Activity Limitations weakness severe, generalized;other (see comments)   Pt states she has some right sided weakness    Usual Activity Tolerance moderate    Current Activity Tolerance poor            Psychosocial     None            Abuse/Neglect     None            Legal     None            Substance Abuse     None            Patient Forms     None          Shelly De La Cruz

## 2017-09-21 NOTE — CONSULTS
"Romy Arias  1945  [id number]  9/21/2017      Referring Provider: Farhana Merlos DO  Reason for Consultation: Possible osteomyelitis R great toe      Subjective   History of present illness:    Patient is a very pleasant  71 y.o.  Yr old female with DM 2 with a three year hx of purulent drainage from her right great toe, worsening in the past few weeks. She reports no sensation in her feet or toes, denies pain, and denies swelling. She has tried topical medicinal honey applications as well as topical iodine, without improvement. She does not have fever, chills, or recent systemic infections. She has been hospitalized for a stroke. She also complains of a \"rash\" all over due to gardening aba bushes.    Antibiotics: None    Past Medical History:   Diagnosis Date   • Arthritis    • Cancer    • Colon polyps    • Gallstone    • Hypertension    • Rheumatic fever    • Sepsis due to urinary tract infection    • Stroke        Past Surgical History:   Procedure Laterality Date   • BLADDER SURGERY     • GALLBLADDER SURGERY     • HYSTERECTOMY      JUSTEN   • THYROID SURGERY         Pediatric History   Patient Guardian Status   • Not on file.     Other Topics Concern   • Not on file     Social History Narrative       family history includes Arthritis in her other; Diabetes in her other; Heart attack in her other; Hyperlipidemia in her other; Hypertension in her other; Kidney disease in her other; Lung cancer in her other; Osteoporosis in her other.    Allergies   Allergen Reactions   • Demerol [Meperidine]    • Dilantin [Phenytoin Sodium Extended]    • Lyrica [Pregabalin]    • Sulfa Antibiotics    • Tegretol [Carbamazepine]    • Tetracyclines & Related    • Ultram [Tramadol]    • Vioxx [Rofecoxib]        Medication:  Antibiotics:  IV Anti-Infectives     None          Please refer to the medical record for a full medication list    Review of Systems    General: No fever  HEENT:  No icterus  Resp:  No cough  CV: No chest " "pain  ABD:  No diarrhea  :  No dysuria  MS:  No pain  Neuro:  Co CVA  Skin:  Co rash, fell into a thorn bush  Endocrine:  Co DM  Psych:  Co anxiety  Hematological  No bleeding      Objective     Physical Exam:   Vital Signs   Temp:  [97 °F (36.1 °C)-98.1 °F (36.7 °C)] 97.6 °F (36.4 °C)  Heart Rate:  [60-66] 61  Resp:  [16] 16  BP: (139-167)/(53-87) 167/69    Blood pressure 167/69, pulse 61, temperature 97.6 °F (36.4 °C), temperature source Axillary, resp. rate 16, height 68\" (172.7 cm), weight 215 lb (97.5 kg), SpO2 95 %, not currently breastfeeding.  GENERAL: Awake and alert, in mild distress.   HEENT: Oropharynx without thrush. Dentition in good repair.  EYES: PERRLA. EOMI. No conjunctival injection. No icterus.   LYMPHATICS: No lymphadenopathy of the neck.   HEART: No murmur, gallop, or pericardial friction rub.   LUNGS: Clear to auscultation anteriorly. No respiratory distress.  ABDOMEN: Soft, nontender, nondistended. No appreciable HSM.    Neuro: Minimal drooping of left face. Right great toe fails proprioceptive testing.  SKIN: Multiple 1-2 cm circular scabs over arms, legs, abdomen and scalp, sparing mid-back.   PSYCHIATRIC: Mental status lucid. No confusion  EXT:  Right great toe is swollen. Nail bed is erythematous with missing nail. Proximal nail is yellow-green with induration to nail fold but lacking purulent drainage. Lack of odor, nail lifting, or scaling noted. Second digit nail is also shortened, with erythematous, and scaling nail bed.         Lab Results   Component Value Date    WBC 9.37 09/21/2017    HGB 13.7 09/21/2017    HCT 43.8 09/21/2017    MCV 86.6 09/21/2017     09/21/2017       Lab Results   Component Value Date    GLUCOSE 152 (H) 09/21/2017    BUN 16 09/21/2017    CREATININE 0.90 09/21/2017    EGFRIFNONA 62 09/21/2017    BCR 17.8 09/21/2017    CO2 32.0 (H) 09/21/2017    CALCIUM 8.9 09/21/2017    ALBUMIN 3.60 09/21/2017    LABIL2 1.1 (L) 09/21/2017    AST 32 09/21/2017    ALT 23 " 09/21/2017       Estimated Creatinine Clearance: 68.9 mL/min (by C-G formula based on Cr of 0.9).      Microbiology:  None to date      Radiology:  Imaging Results (last 72 hours)     Procedure Component Value Units Date/Time    CT Cerebral Perfusion With & Without Contrast [[id number]] Collected:  09/20/17 1151     Updated:  09/20/17 1246    Narrative:       EXAMINATION: CT CEREBRAL PERFUSION WITH AND WITHOUT CONTRAST-09/20/2017:        INDICATION: CVA.      TECHNIQUE: CT cerebral perfusion with and without intravenous contrast  administration. Multiple parametric maps including mean transit time,  time to drain, cerebral blood flow and cerebral blood volume were  performed.     The radiation dose reduction device was turned on for each scan per the  ALARA (As Low as Reasonably Achievable) protocol.     COMPARISON: CT head 03/12/2014.     FINDINGS: No focal defect in parametric maps of mean transit time or  time to drain. Cerebral blood flow and cerebral blood volume preserved  without focal defect to suggest acute ischemia involving a specific  vascular territory or ischemic penumbra.       Impression:       Normal CT cerebral perfusion without evidence for focal  defect to suggest acute ischemia of specific vascular territory.     D:  09/20/2017  E:  09/20/2017           This report was finalized on 9/20/2017 12:43 PM by Dr. Bryce Willis.       CT Angiogram Head With & Without Contrast [[id number]] Collected:  09/20/17 1212     Updated:  09/20/17 1246    Narrative:       EXAMINATION: CT ANGIOGRAM HEAD W WO CONTRAST-, CT ANGIOGRAM NECK W WO  CONTRAST- 09/20/2017     INDICATION: CVA      TECHNIQUE: CT angiogram of the head and neck with and without  intravenous contrast administration.     The radiation dose reduction device was turned on for each scan per the  ALARA (As Low as Reasonably Achievable) protocol.     COMPARISON: NONE     FINDINGS:      NECK: Normal 3 vessel arch with patent great vessel origins.  Right  dominant vertebral artery system demonstrating patency without  hemodynamically significant stenosis, aneurysm or occlusion. Bilateral  cervical carotid systems demonstrate severely retropharyngeal course in  the midportion. Minimal atherosclerotic involvement of the bilateral  carotid bulbs with approximately 15% right and 10% left luminal  narrowing.      Measurements performed utilizing NASCET criteria.      Head: Distal internal carotid arteries are patent without  hemodynamically significant stenosis, aneurysm or occlusion. [place] of  [place] and branch vessels including anterior, middle and posterior  cerebral arteries patent without hemodynamically significant stenosis,  aneurysm or occlusion. Fetal origin of the right PCA noted.  Vertebrobasilar system patent without hemodynamically significant  stenosis, aneurysm or occlusion. Tortuosity and high branching pattern  of the vertebral or persistent fetal origins.        Impression:       CTA of the head and neck without hemodynamically significant  stenosis, aneurysm or occlusion.     D:  09/20/2017  E:  09/20/2017     This report was finalized on 9/20/2017 12:43 PM by Dr. Bryce Willis.       CT Angiogram Neck With & Without Contrast [[id number]] Collected:  09/20/17 1212     Updated:  09/20/17 1246    Narrative:       EXAMINATION: CT ANGIOGRAM HEAD W WO CONTRAST-, CT ANGIOGRAM NECK W WO  CONTRAST- 09/20/2017     INDICATION: CVA      TECHNIQUE: CT angiogram of the head and neck with and without  intravenous contrast administration.     The radiation dose reduction device was turned on for each scan per the  ALARA (As Low as Reasonably Achievable) protocol.     COMPARISON: NONE     FINDINGS:      NECK: Normal 3 vessel arch with patent great vessel origins. Right  dominant vertebral artery system demonstrating patency without  hemodynamically significant stenosis, aneurysm or occlusion. Bilateral  cervical carotid systems demonstrate severely  retropharyngeal course in  the midportion. Minimal atherosclerotic involvement of the bilateral  carotid bulbs with approximately 15% right and 10% left luminal  narrowing.      Measurements performed utilizing NASCET criteria.      Head: Distal internal carotid arteries are patent without  hemodynamically significant stenosis, aneurysm or occlusion. [place] of  [place] and branch vessels including anterior, middle and posterior  cerebral arteries patent without hemodynamically significant stenosis,  aneurysm or occlusion. Fetal origin of the right PCA noted.  Vertebrobasilar system patent without hemodynamically significant  stenosis, aneurysm or occlusion. Tortuosity and high branching pattern  of the vertebral or persistent fetal origins.        Impression:       CTA of the head and neck without hemodynamically significant  stenosis, aneurysm or occlusion.     D:  09/20/2017  E:  09/20/2017     This report was finalized on 9/20/2017 12:43 PM by Dr. Bryce Willis.       MRI Brain Without Contrast [[id number]] Collected:  09/21/17 1310     Updated:  09/21/17 1316    Narrative:       EXAMINATION: MRI BRAIN WO CONTRAST-09/21/2017:     INDICATION: Stroke; R13.10-Dysphagia, unspecified; R41.841-Cognitive  communication deficit.         TECHNIQUE:  MR data sets of the brain are performed without intravenous  contrast.     COMPARISON: Compared to previous CT data sets and CT perfusion data sets  of yesterday.     FINDINGS:   1. There is a focal acute lacunar infarct in the leftward aspect of the  emerson with acute restricted diffusion and low signal on the ADC mapping  exam.     There is corresponding low signal in the leftward emerson on the ADC  mapping exam indicating an acute event.     2. There is mild tonsillar ectopia with minimal crowding of the  cervicomedullary junction without high-grade stenosis. Midline  structures are otherwise unremarkable. There is mild atrophy of the  pituitary gland. Trace mastoid fluid is seen  on the right. Paranasal  sinuses and conal contents are otherwise unremarkable.     3. The basilar artery and basilar summit are within normal limits. The  flow voids are unremarkable. Peripheral cortical atrophy is identified.  There is no significant central atrophy.     4.  Increased T2 and FLAIR signal is noted in the white matter and  periventricular areas suggesting microangiopathy diffusely.       Impression:       1.  Focal lacunar acute infarct in the leftward emerson with acute  restricted diffusion and low signal on ADC mapping exam. Associated  mass, edema or hemorrhage is not identified.  2.  Diffuse microangiopathic small vessel disease noted elsewhere  consistent with the patient's age.  3.  Edema, mass, hemorrhage or other acute focal insult is not  identified elsewhere.     D:  09/21/2017  E:  09/21/2017                    ASSESSMENT AND PLAN:    -- Bacterial infection of right great toe  -- Possible osteomyelitis  -- DM with polyneuropathy  -- Peripheral vascular disease  -- Dementia  -- Stroke  -- Anxious excoriations    X-ray R foot with focus on right foot  If purulence is noted, culture should be obtained, will order  Will follow and add antibiotics based on results  Would advise adjusting anxiolytic medications to reduce excoriations, to cut fingernails, and to monitor for impetiginization  Consider adding back home medication Bactroban  3 phase bone scan          I discussed the patients findings and my recommendations with the patient and her son.    Thank you for this consult.  Our group would be pleased to follow this patient over the course of their hospitalization and assist with outpatient antimicrobial therapy, as indicated.     Rishabh Kahn MD  9/21/2017

## 2017-09-21 NOTE — PLAN OF CARE
Problem: Patient Care Overview (Adult)  Goal: Plan of Care Review  Outcome: Ongoing (interventions implemented as appropriate)    09/21/17 8199   Coping/Psychosocial Response Interventions   Plan Of Care Reviewed With patient   Patient Care Overview   Progress (BS re-eval; S/L tx; FEES)   Outcome Evaluation   Outcome Summary/Follow up Plan Repeat clinical dysphagia eval complete. R labial droop. Trials of thins and nectar-thick via tsp, cup, and straw, pudding, and solid consistencies given. Overt s/s of aspiration c/b throat clearing w/ thins and nectar-thick via straw. Incr'd prep and lingual residue w/ solid. RECS: NPO, meds alt route, FEES today.         FEES complete. Pt noted w/ asymmetrical posterior pharyngeal wall w/ protrusion on L side of pharynx that did not appear to be edematous. Pt presents w/ severe oropharyngeal dysphagia. Trials of thins via tsp, and cup, nectar-thick via tsp, cup, and straw, pudding, and solid consistencies given. Swallow initiation was delayed to the lateral channels w/ thins and nectar and to the valleculae w/ pudding. Penetration observed before the swallow over the posterior portion of the epiglottis w/ resulting aspiration during the swallow w/ both thins and nectar-thick liquids 2' impaired timing. Suspect aspiration occurred during the swallow w/ pudding consistency, as significant residue was observed in the laryngeal vestibule and below the vocal folds after the swallow due to decr'd vestibular closure. Pt was unable to completely clear residue in vestibule, even w/ cued cough and multiple swallows. Incr'd prep and oral transit of solid cracker w/ penetration before and resulting aspiration during the swallow upon presentation of nectar-thick liquid wash. Moderate residue was observed inconsistently on the posterior commissure w/ pudding that pt was at risk of aspirating after the swallow due to decr'd excursion. Otherwise, vallecuar and pyriform sinus residue was mild w/  all consistencies. All aspiration on this exam was SILENT. Pt would benefit from temporary means of alt nutrition while completing dysphagia exercise program per MD discretion, although pt did not initially want keofeed per MD note. SLP will provide comfort diet recs depending on POC. RECS: Safest-NPO, meds alt route, temporary alt nutrition per MD discretion, initiate dysphagia tx. If pt wants comfort diet then: Nectar-thick liquids (no straws), Level 4 dysphagia diet-mech soft, meds crushed in pureed.         S/L tx complete. Pt w/ decr'd attention to task, required frequent cues to re-direct. Taught overarticulation strategy for dysarthria, which has significantly improved since initial eval. RECS: Cont S/L tx.          Problem: Stroke (Ischemic) (Adult)  Goal: Signs and Symptoms of Listed Potential Problems Will be Absent or Manageable (Stroke)  Outcome: Ongoing (interventions implemented as appropriate)    09/21/17 1729   Stroke (Ischemic)   Problems Assessed (Stroke (Ischemic)/TIA) eating/swallowing impairment   Problems Present (Stroke (Ischemic)/TIA) eating/swallowing impairment         Problem: Inpatient SLP  Goal: Dysarthria-Patient will improve motor speech skills to allow optimal participation in care  Outcome: Ongoing (interventions implemented as appropriate)    09/20/17 1606 09/21/17 1729   Dysarthria- Optimal Particpation in Care   Dysarthria Optimal Participation in Care- SLP, Date Established 09/20/17 --    Dysarthria Optimal Participation in Care- SLP, Time to Achieve by discharge --    Dysarthria Optimal Participation in Care- SLP, Date Goal Reviewed --  09/21/17   Dysarthria Optimal Participation in Care- SLP, Outcome --  goal ongoing       Goal: Cognitive-linguistic-Patient will improve Cognitive-linguistic skills to allow optimal participation in care  Outcome: Ongoing (interventions implemented as appropriate)    09/20/17 1606 09/21/17 1729   Cognitive Linguistic- Optimal Participation in  Care   Cognitive Linguistic Optimal Participation in Care- SLP, Date Established 09/20/17 --    Cognitive Linguistic Optimal Participation in Care- SLP, Time to Achieve by discharge --    Cognitive Linguistic Optimal Participation in Care- SLP, Date Goal Reviewed --  09/21/17   Cognitive Linguistic Optimal Participation in Care- SLP, Outcome --  goal ongoing       Goal: Dysphagia- Patient will improve swallowing skills to begin to take some PO safely  Outcome: Ongoing (interventions implemented as appropriate)

## 2017-09-21 NOTE — PROGRESS NOTES
Daily Progress Note  Neurology     LOS: 1 day     Subjective     Chief Complaint: Right hemiparesis and dysarthria    Interval History:  No acute events overnight.  Patient had difficulty tolerating MRI last evening but was able to do so today    ROS: Negative for fever    Objective     Vital signs in last 24 hours:  Temp:  [97 °F (36.1 °C)-98.1 °F (36.7 °C)] 97.6 °F (36.4 °C)  Heart Rate:  [60-66] 61  Resp:  [16] 16  BP: (139-167)/(53-87) 167/69      Physical Exam:   General: Sitting in bedside chair crossing and uncrossing her legs. In NAD.     Respiratory: Respirations unlabored   CV: RRR       Neurologic Exam:   Mental status: Awake, alert, Follows commands.  Speech mildly dysarthric   CN: Right lower facial droop                      Results from last 7 days  Lab Units 09/21/17  0525   WBC 10*3/mm3 9.37   HEMOGLOBIN g/dL 13.7   HEMATOCRIT % 43.8   PLATELETS 10*3/mm3 284           Results Review:  Labs reviewed  MRI brain personally reviewed.  There is a small area of restricted diffusion seen in the left anterior emerson  TTE report reviewed      Assessment/Plan     Active Problems:    Chronic pain    Dementia    Polyneuropathy    Peripheral vascular disease    Acid reflux    Chronic osteomyelitis    Depression    Emphysema/COPD    Hypertension    Diabetes mellitus type 2, uncontrolled, with complications    CVA (cerebral vascular accident)        1.  Acute ischemic stroke = Small left pontine infarct on MRI. On aspirin and atorvastatin.  TTE report unremarkable. Rehab eval. Pt currently NPO. Per notes she declined keofeed. Speech therapy following.       2.  Hypertension = continue meds     3.  Type 2 diabetes mellitus =  uncontrolled according to the patient.  A1c in AM.  Continue meds    4.  Hyperlipidemia = .  On atorvastatin      Analy Briggs MD  09/21/17  1:52 PM

## 2017-09-22 ENCOUNTER — APPOINTMENT (OUTPATIENT)
Dept: NUCLEAR MEDICINE | Facility: HOSPITAL | Age: 72
End: 2017-09-22

## 2017-09-22 LAB
ALBUMIN SERPL-MCNC: 3.5 G/DL (ref 3.2–4.8)
ALBUMIN/GLOB SERPL: 1.1 G/DL (ref 1.5–2.5)
ALP SERPL-CCNC: 125 U/L (ref 25–100)
ALT SERPL W P-5'-P-CCNC: 21 U/L (ref 7–40)
ANION GAP SERPL CALCULATED.3IONS-SCNC: 4 MMOL/L (ref 3–11)
AST SERPL-CCNC: 30 U/L (ref 0–33)
BILIRUB SERPL-MCNC: 0.7 MG/DL (ref 0.3–1.2)
BUN BLD-MCNC: 14 MG/DL (ref 9–23)
BUN/CREAT SERPL: 17.5 (ref 7–25)
CALCIUM SPEC-SCNC: 8.8 MG/DL (ref 8.7–10.4)
CHLORIDE SERPL-SCNC: 100 MMOL/L (ref 99–109)
CO2 SERPL-SCNC: 28 MMOL/L (ref 20–31)
CREAT BLD-MCNC: 0.8 MG/DL (ref 0.6–1.3)
DEPRECATED RDW RBC AUTO: 40.8 FL (ref 37–54)
ERYTHROCYTE [DISTWIDTH] IN BLOOD BY AUTOMATED COUNT: 13.2 % (ref 11.3–14.5)
GFR SERPL CREATININE-BSD FRML MDRD: 71 ML/MIN/1.73
GLOBULIN UR ELPH-MCNC: 3.3 GM/DL
GLUCOSE BLD-MCNC: 162 MG/DL (ref 70–100)
GLUCOSE BLDC GLUCOMTR-MCNC: 157 MG/DL (ref 70–130)
GLUCOSE BLDC GLUCOMTR-MCNC: 164 MG/DL (ref 70–130)
GLUCOSE BLDC GLUCOMTR-MCNC: 171 MG/DL (ref 70–130)
GLUCOSE BLDC GLUCOMTR-MCNC: 176 MG/DL (ref 70–130)
HCT VFR BLD AUTO: 43 % (ref 34.5–44)
HGB BLD-MCNC: 13.6 G/DL (ref 11.5–15.5)
MCH RBC QN AUTO: 27 PG (ref 27–31)
MCHC RBC AUTO-ENTMCNC: 31.6 G/DL (ref 32–36)
MCV RBC AUTO: 85.3 FL (ref 80–99)
PLATELET # BLD AUTO: 264 10*3/MM3 (ref 150–450)
PMV BLD AUTO: 9.7 FL (ref 6–12)
POTASSIUM BLD-SCNC: 3.9 MMOL/L (ref 3.5–5.5)
PROT SERPL-MCNC: 6.8 G/DL (ref 5.7–8.2)
RBC # BLD AUTO: 5.04 10*6/MM3 (ref 3.89–5.14)
SODIUM BLD-SCNC: 132 MMOL/L (ref 132–146)
WBC NRBC COR # BLD: 10.6 10*3/MM3 (ref 3.5–10.8)

## 2017-09-22 PROCEDURE — 99233 SBSQ HOSP IP/OBS HIGH 50: CPT | Performed by: INTERNAL MEDICINE

## 2017-09-22 PROCEDURE — 0 TECHNETIUM MEDRONATE KIT: Performed by: INTERNAL MEDICINE

## 2017-09-22 PROCEDURE — 82962 GLUCOSE BLOOD TEST: CPT

## 2017-09-22 PROCEDURE — 92507 TX SP LANG VOICE COMM INDIV: CPT

## 2017-09-22 PROCEDURE — 25010000002 HYDROMORPHONE PER 4 MG: Performed by: HOSPITALIST

## 2017-09-22 PROCEDURE — A9503 TC99M MEDRONATE: HCPCS | Performed by: INTERNAL MEDICINE

## 2017-09-22 PROCEDURE — 25010000002 ONDANSETRON PER 1 MG: Performed by: PHYSICIAN ASSISTANT

## 2017-09-22 PROCEDURE — 85027 COMPLETE CBC AUTOMATED: CPT | Performed by: NURSE PRACTITIONER

## 2017-09-22 PROCEDURE — 80053 COMPREHEN METABOLIC PANEL: CPT | Performed by: NURSE PRACTITIONER

## 2017-09-22 PROCEDURE — 63710000001 INSULIN LISPRO (HUMAN) PER 5 UNITS: Performed by: FAMILY MEDICINE

## 2017-09-22 PROCEDURE — 99232 SBSQ HOSP IP/OBS MODERATE 35: CPT | Performed by: PSYCHIATRY & NEUROLOGY

## 2017-09-22 PROCEDURE — 92526 ORAL FUNCTION THERAPY: CPT

## 2017-09-22 PROCEDURE — 78315 BONE IMAGING 3 PHASE: CPT

## 2017-09-22 RX ORDER — OXYCODONE HYDROCHLORIDE 15 MG/1
15 TABLET, FILM COATED, EXTENDED RELEASE ORAL EVERY 12 HOURS SCHEDULED
Status: DISCONTINUED | OUTPATIENT
Start: 2017-09-22 | End: 2017-09-23 | Stop reason: HOSPADM

## 2017-09-22 RX ORDER — TC 99M MEDRONATE 20 MG/10ML
21.2 INJECTION, POWDER, LYOPHILIZED, FOR SOLUTION INTRAVENOUS
Status: COMPLETED | OUTPATIENT
Start: 2017-09-22 | End: 2017-09-22

## 2017-09-22 RX ORDER — HYDROCHLOROTHIAZIDE 12.5 MG/1
12.5 TABLET ORAL DAILY
Status: DISCONTINUED | OUTPATIENT
Start: 2017-09-22 | End: 2017-09-23 | Stop reason: HOSPADM

## 2017-09-22 RX ORDER — LISINOPRIL 20 MG/1
20 TABLET ORAL
Status: DISCONTINUED | OUTPATIENT
Start: 2017-09-22 | End: 2017-09-23 | Stop reason: HOSPADM

## 2017-09-22 RX ORDER — LABETALOL HYDROCHLORIDE 5 MG/ML
10 INJECTION, SOLUTION INTRAVENOUS
Status: DISCONTINUED | OUTPATIENT
Start: 2017-09-22 | End: 2017-09-23 | Stop reason: HOSPADM

## 2017-09-22 RX ORDER — OXYCODONE AND ACETAMINOPHEN 10; 325 MG/1; MG/1
1 TABLET ORAL EVERY 8 HOURS PRN
Status: DISCONTINUED | OUTPATIENT
Start: 2017-09-22 | End: 2017-09-23 | Stop reason: HOSPADM

## 2017-09-22 RX ORDER — ONDANSETRON 2 MG/ML
4 INJECTION INTRAMUSCULAR; INTRAVENOUS ONCE
Status: COMPLETED | OUTPATIENT
Start: 2017-09-22 | End: 2017-09-22

## 2017-09-22 RX ADMIN — GABAPENTIN 300 MG: 300 CAPSULE ORAL at 13:07

## 2017-09-22 RX ADMIN — ONDANSETRON 4 MG: 2 INJECTION INTRAMUSCULAR; INTRAVENOUS at 06:48

## 2017-09-22 RX ADMIN — HYDROMORPHONE HYDROCHLORIDE 0.5 MG: 1 INJECTION, SOLUTION INTRAMUSCULAR; INTRAVENOUS; SUBCUTANEOUS at 02:31

## 2017-09-22 RX ADMIN — Medication 21.2 MILLICURIE: at 09:25

## 2017-09-22 RX ADMIN — LIDOCAINE 1 PATCH: 50 PATCH CUTANEOUS at 22:55

## 2017-09-22 RX ADMIN — ATORVASTATIN CALCIUM 80 MG: 40 TABLET, FILM COATED ORAL at 20:33

## 2017-09-22 RX ADMIN — ATENOLOL 25 MG: 25 TABLET ORAL at 15:21

## 2017-09-22 RX ADMIN — HYDROMORPHONE HYDROCHLORIDE 0.5 MG: 1 INJECTION, SOLUTION INTRAMUSCULAR; INTRAVENOUS; SUBCUTANEOUS at 00:33

## 2017-09-22 RX ADMIN — HYDROCHLOROTHIAZIDE 12.5 MG: 12.5 TABLET ORAL at 18:16

## 2017-09-22 RX ADMIN — LISINOPRIL 20 MG: 20 TABLET ORAL at 18:16

## 2017-09-22 RX ADMIN — HYDROMORPHONE HYDROCHLORIDE 0.5 MG: 1 INJECTION, SOLUTION INTRAMUSCULAR; INTRAVENOUS; SUBCUTANEOUS at 09:42

## 2017-09-22 RX ADMIN — HYDROMORPHONE HYDROCHLORIDE 0.5 MG: 1 INJECTION, SOLUTION INTRAMUSCULAR; INTRAVENOUS; SUBCUTANEOUS at 11:37

## 2017-09-22 RX ADMIN — GABAPENTIN 300 MG: 300 CAPSULE ORAL at 20:33

## 2017-09-22 RX ADMIN — OXYCODONE HYDROCHLORIDE 15 MG: 15 TABLET, FILM COATED, EXTENDED RELEASE ORAL at 13:07

## 2017-09-22 RX ADMIN — ASPIRIN 300 MG: 300 SUPPOSITORY RECTAL at 09:00

## 2017-09-22 RX ADMIN — OXYCODONE HYDROCHLORIDE 15 MG: 15 TABLET, FILM COATED, EXTENDED RELEASE ORAL at 20:33

## 2017-09-22 RX ADMIN — HYDROMORPHONE HYDROCHLORIDE 0.5 MG: 1 INJECTION, SOLUTION INTRAMUSCULAR; INTRAVENOUS; SUBCUTANEOUS at 13:37

## 2017-09-22 RX ADMIN — INSULIN LISPRO 2 UNITS: 100 INJECTION, SOLUTION INTRAVENOUS; SUBCUTANEOUS at 16:49

## 2017-09-22 RX ADMIN — INSULIN LISPRO 2 UNITS: 100 INJECTION, SOLUTION INTRAVENOUS; SUBCUTANEOUS at 11:36

## 2017-09-22 RX ADMIN — AMITRIPTYLINE HYDROCHLORIDE 50 MG: 50 TABLET, FILM COATED ORAL at 22:55

## 2017-09-22 RX ADMIN — HYDROMORPHONE HYDROCHLORIDE 0.5 MG: 1 INJECTION, SOLUTION INTRAMUSCULAR; INTRAVENOUS; SUBCUTANEOUS at 17:52

## 2017-09-22 RX ADMIN — HYDROMORPHONE HYDROCHLORIDE 0.5 MG: 1 INJECTION, SOLUTION INTRAMUSCULAR; INTRAVENOUS; SUBCUTANEOUS at 04:58

## 2017-09-22 RX ADMIN — OXYCODONE HYDROCHLORIDE AND ACETAMINOPHEN 1 TABLET: 10; 325 TABLET ORAL at 18:40

## 2017-09-22 RX ADMIN — INSULIN LISPRO 2 UNITS: 100 INJECTION, SOLUTION INTRAVENOUS; SUBCUTANEOUS at 08:10

## 2017-09-22 RX ADMIN — HYDROMORPHONE HYDROCHLORIDE 0.5 MG: 1 INJECTION, SOLUTION INTRAMUSCULAR; INTRAVENOUS; SUBCUTANEOUS at 07:40

## 2017-09-22 NOTE — PLAN OF CARE
Problem: Patient Care Overview (Adult)  Goal: Plan of Care Review  Outcome: Ongoing (interventions implemented as appropriate)    09/22/17 4987   Coping/Psychosocial Response Interventions   Plan Of Care Reviewed With patient   Patient Care Overview   Progress no change

## 2017-09-22 NOTE — PROGRESS NOTES
"Romy Arias  1945  [id number]  9/22/2017    CC: No chief complaint on file.      Romy Arias is a 71 y.o. female here for cc stroke        History of present illness:    Patient is a very pleasant  71 y.o.  Yr old female with DM 2 with a three year hx of purulent drainage from her right great toe, worsening in the past few weeks. She reports no sensation in her feet or toes, denies pain, and denies swelling. She has tried topical medicinal honey applications as well as topical iodine, without improvement. She does not have fever, chills, or recent systemic infections. She has been hospitalized for a stroke. She also complains of a \"rash\" all over due to gardening aba bushes.    9/22 - co toe drainage  No fever or chills  No rash      Past medical history:  Past Medical History:   Diagnosis Date   • Arthritis    • Cancer    • Colon polyps    • Gallstone    • Hypertension    • Rheumatic fever    • Sepsis due to urinary tract infection    • Stroke        Medications:   Current Facility-Administered Medications:   •  ALPRAZolam (XANAX) tablet 0.25 mg, 0.25 mg, Oral, BID PRN, Farhana L Atkins, DO  •  amitriptyline (ELAVIL) tablet 50 mg, 50 mg, Oral, Nightly PRN, Farhana L Atkins, DO  •  aspirin chewable tablet 81 mg, 81 mg, Oral, Daily **OR** aspirin suppository 300 mg, 300 mg, Rectal, Daily, Anona N Maynard, APRN, 300 mg at 09/22/17 0900  •  atenolol (TENORMIN) tablet 25 mg, 25 mg, Oral, Daily, Farhana L Atkins, DO  •  atorvastatin (LIPITOR) tablet 80 mg, 80 mg, Oral, Nightly, Anona N Maynard, APRN  •  dextrose (D50W) solution 25 g, 25 g, Intravenous, Q15 Min PRN, Farhana L Atkins, DO  •  dextrose (GLUTOSE) oral gel 15 g, 15 g, Oral, Q15 Min PRN, Farhana L Atkins, DO  •  gabapentin (NEURONTIN) capsule 300 mg, 300 mg, Oral, Q8H, Farhana L Ataide, DO, 300 mg at 09/22/17 1307  •  glucagon (GLUCAGEN) injection 1 mg, 1 mg, Subcutaneous, Q15 Min PRN, Farhana Merlos DO  •  HYDROmorphone (DILAUDID) injection 0.5 " "mg, 0.5 mg, Intravenous, Q2H PRN, Swetha CLEMENTS MD, 0.5 mg at 09/22/17 1337  •  insulin lispro (humaLOG) injection 0-7 Units, 0-7 Units, Subcutaneous, 4x Daily With Meals & Nightly, Farhanafabricio Merlos, DO, 2 Units at 09/22/17 1136  •  labetalol (NORMODYNE,TRANDATE) injection 20 mg, 20 mg, Intravenous, Q10 Min PRN, JJ Gasca  •  lidocaine (LIDODERM) 5 % 1 patch, 1 patch, Transdermal, Q24H, Farhana L Gaurang, DO, 1 patch at 09/21/17 2021  •  niCARdipine (CARDENE) 25 mg/250 mL (0.1 mg/mL) 0.9% NS VTB infusion, 5-15 mg/hr, Intravenous, Titrated, Farhana AKASH Merlos, DO  •  oxyCODONE ER (oxyCONTIN) 12 hr tablet 15 mg, 15 mg, Oral, Q12H, Cha Sanchez MD, 15 mg at 09/22/17 1307  •  oxyCODONE-acetaminophen (PERCOCET)  MG per tablet 1 tablet, 1 tablet, Oral, Q8H PRN, Cha Sanchez MD  •  sodium chloride 0.9 % flush 1-10 mL, 1-10 mL, Intravenous, PRN, Morena Maynard, APRN  Antibiotics:  IV Anti-Infectives     None          Allergies:  is allergic to demerol [meperidine]; dilantin [phenytoin sodium extended]; lyrica [pregabalin]; sulfa antibiotics; tegretol [carbamazepine]; tetracyclines & related; ultram [tramadol]; and vioxx [rofecoxib].    Family History: family history includes Arthritis in her other; Diabetes in her other; Heart attack in her other; Hyperlipidemia in her other; Hypertension in her other; Kidney disease in her other; Lung cancer in her other; Osteoporosis in her other.    Social History:  reports that she quit smoking about 20 years ago. She does not have any smokeless tobacco history on file. She reports that she does not drink alcohol or use illicit drugs.    Review of Systems: All other reviewed and negative except as per HPI    Blood pressure (!) 182/81, pulse 75, temperature 96.9 °F (36.1 °C), temperature source Oral, resp. rate 17, height 68\" (172.7 cm), weight 215 lb (97.5 kg), SpO2 94 %, not currently breastfeeding.  GENERAL: Awake and alert, in mild  distress.   HEENT: " Oropharynx without thrush. . No cervical adenopathy. No neck masses  EYES: . No conjunctival injection. No icterus.   LYMPHATICS: No lymphadenopathy of the neck   HEART: No murmur, gallop, or pericardial friction rub.   LUNGS: Clear to auscultation anteriorly. No respiratory distress  ABDOMEN: Soft, nontender, nondistended.    SKIN: Warm and dry without cutaneous eruptions. .   PSYCHIATRIC: Mental status lucid. Cranial nerve function intact.   EXT: toe without change      DIAGNOSTICS:  Lab Results   Component Value Date    WBC 10.60 09/22/2017    HGB 13.6 09/22/2017    HCT 43.0 09/22/2017     09/22/2017     Lab Results   Component Value Date    CRP 12.700 (H) 05/06/2014     Lab Results   Component Value Date    SEDRATE 6 05/06/2014     Lab Results   Component Value Date    GLUCOSE 162 (H) 09/22/2017    BUN 14 09/22/2017    CREATININE 0.80 09/22/2017    EGFRIFNONA 71 09/22/2017    BCR 17.5 09/22/2017    CO2 28.0 09/22/2017    CALCIUM 8.8 09/22/2017    ALBUMIN 3.50 09/22/2017    LABIL2 1.1 (L) 09/22/2017    AST 30 09/22/2017    ALT 21 09/22/2017       Microbiology: culture pending      RADIOLOGY:  Imaging Results (last 72 hours)     Procedure Component Value Units Date/Time    CT Cerebral Perfusion With & Without Contrast [[id number]] Collected:  09/20/17 1151     Updated:  09/20/17 1246    Narrative:       EXAMINATION: CT CEREBRAL PERFUSION WITH AND WITHOUT CONTRAST-09/20/2017:        INDICATION: CVA.      TECHNIQUE: CT cerebral perfusion with and without intravenous contrast  administration. Multiple parametric maps including mean transit time,  time to drain, cerebral blood flow and cerebral blood volume were  performed.     The radiation dose reduction device was turned on for each scan per the  ALARA (As Low as Reasonably Achievable) protocol.     COMPARISON: CT head 03/12/2014.     FINDINGS: No focal defect in parametric maps of mean transit time or  time to drain. Cerebral blood flow and cerebral blood  volume preserved  without focal defect to suggest acute ischemia involving a specific  vascular territory or ischemic penumbra.       Impression:       Normal CT cerebral perfusion without evidence for focal  defect to suggest acute ischemia of specific vascular territory.     D:  09/20/2017  E:  09/20/2017           This report was finalized on 9/20/2017 12:43 PM by Dr. Bryce Willis.       CT Angiogram Head With & Without Contrast [[id number]] Collected:  09/20/17 1212     Updated:  09/20/17 1246    Narrative:       EXAMINATION: CT ANGIOGRAM HEAD W WO CONTRAST-, CT ANGIOGRAM NECK W WO  CONTRAST- 09/20/2017     INDICATION: CVA      TECHNIQUE: CT angiogram of the head and neck with and without  intravenous contrast administration.     The radiation dose reduction device was turned on for each scan per the  ALARA (As Low as Reasonably Achievable) protocol.     COMPARISON: NONE     FINDINGS:      NECK: Normal 3 vessel arch with patent great vessel origins. Right  dominant vertebral artery system demonstrating patency without  hemodynamically significant stenosis, aneurysm or occlusion. Bilateral  cervical carotid systems demonstrate severely retropharyngeal course in  the midportion. Minimal atherosclerotic involvement of the bilateral  carotid bulbs with approximately 15% right and 10% left luminal  narrowing.      Measurements performed utilizing NASCET criteria.      Head: Distal internal carotid arteries are patent without  hemodynamically significant stenosis, aneurysm or occlusion. [place] of  [place] and branch vessels including anterior, middle and posterior  cerebral arteries patent without hemodynamically significant stenosis,  aneurysm or occlusion. Fetal origin of the right PCA noted.  Vertebrobasilar system patent without hemodynamically significant  stenosis, aneurysm or occlusion. Tortuosity and high branching pattern  of the vertebral or persistent fetal origins.        Impression:       CTA of the head  and neck without hemodynamically significant  stenosis, aneurysm or occlusion.     D:  09/20/2017  E:  09/20/2017     This report was finalized on 9/20/2017 12:43 PM by Dr. Bryce Willis.       CT Angiogram Neck With & Without Contrast [[id number]] Collected:  09/20/17 1212     Updated:  09/20/17 1246    Narrative:       EXAMINATION: CT ANGIOGRAM HEAD W WO CONTRAST-, CT ANGIOGRAM NECK W WO  CONTRAST- 09/20/2017     INDICATION: CVA      TECHNIQUE: CT angiogram of the head and neck with and without  intravenous contrast administration.     The radiation dose reduction device was turned on for each scan per the  ALARA (As Low as Reasonably Achievable) protocol.     COMPARISON: NONE     FINDINGS:      NECK: Normal 3 vessel arch with patent great vessel origins. Right  dominant vertebral artery system demonstrating patency without  hemodynamically significant stenosis, aneurysm or occlusion. Bilateral  cervical carotid systems demonstrate severely retropharyngeal course in  the midportion. Minimal atherosclerotic involvement of the bilateral  carotid bulbs with approximately 15% right and 10% left luminal  narrowing.      Measurements performed utilizing NASCET criteria.      Head: Distal internal carotid arteries are patent without  hemodynamically significant stenosis, aneurysm or occlusion. [place] of  [place] and branch vessels including anterior, middle and posterior  cerebral arteries patent without hemodynamically significant stenosis,  aneurysm or occlusion. Fetal origin of the right PCA noted.  Vertebrobasilar system patent without hemodynamically significant  stenosis, aneurysm or occlusion. Tortuosity and high branching pattern  of the vertebral or persistent fetal origins.        Impression:       CTA of the head and neck without hemodynamically significant  stenosis, aneurysm or occlusion.     D:  09/20/2017  E:  09/20/2017     This report was finalized on 9/20/2017 12:43 PM by Dr. Bryce Willis.       XR Toe 2+  View Right [[id number]] Collected:  09/21/17 1753     Updated:  09/21/17 1801    Narrative:       EXAMINATION: XR RIGHT TOE, 2 VIEWS - 09/21/2017     INDICATION:  R13.10-Dysphagia, unspecified; R41.841-Cognitive  communication deficit; Z74.09-Other reduced mobility; R13.12-Dysphagia,  oropharyngeal phase.      COMPARISON: None.     FINDINGS: Cortical irregularity of the distal tuft of the distal phalanx  of the great toe with somewhat hazy margins and surrounding edema. No  discrete soft tissue emphysema is identified.           Impression:       Cortical irregularity distal phalanx great toe with  surrounding edema concerning for osteomyelitis involvement however no  soft tissue emphysema is present.     DICTATED:     09/21/2017  EDITED:         09/21/2017     This report was finalized on 9/21/2017 5:59 PM by Dr. Bryce Willis.       MRI Brain Without Contrast [[id number]] Collected:  09/21/17 1310     Updated:  09/21/17 1826    Narrative:       EXAMINATION: MRI BRAIN WO CONTRAST-09/21/2017:     INDICATION: Stroke; R13.10-Dysphagia, unspecified; R41.841-Cognitive  communication deficit.         TECHNIQUE:  MR data sets of the brain are performed without intravenous  contrast.     COMPARISON: Compared to previous CT data sets and CT perfusion data sets  of yesterday.     FINDINGS:   1. There is a focal acute lacunar infarct in the leftward aspect of the  emerson with acute restricted diffusion and low signal on the ADC mapping  exam.     There is corresponding low signal in the leftward emerson on the ADC  mapping exam indicating an acute event.     2. There is mild tonsillar ectopia with minimal crowding of the  cervicomedullary junction without high-grade stenosis. Midline  structures are otherwise unremarkable. There is mild atrophy of the  pituitary gland. Trace mastoid fluid is seen on the right. Paranasal  sinuses and conal contents are otherwise unremarkable.     3. The basilar artery and basilar summit are within  normal limits. The  flow voids are unremarkable. Peripheral cortical atrophy is identified.  There is no significant central atrophy.     4.  Increased T2 and FLAIR signal is noted in the white matter and  periventricular areas suggesting microangiopathy diffusely.       Impression:       1.  Focal lacunar acute infarct in the leftward emerson with acute  restricted diffusion and low signal on ADC mapping exam. Associated  mass, edema or hemorrhage is not identified.  2.  Diffuse microangiopathic small vessel disease noted elsewhere  consistent with the patient's age.  3.  Edema, mass, hemorrhage or other acute focal insult is not  identified elsewhere.     D:  09/21/2017  E:  09/21/2017            This report was finalized on 9/21/2017 6:23 PM by Dr. Benjamin German MD.               ASSESSMENT AND PLAN:    -- Bacterial infection of right great toe  --  Osteomyelitis - new diagnosis, but chronic  -- DM with polyneuropathy  -- Peripheral vascular disease  -- Dementia  -- Stroke  -- Anxious excoriations     X-ray R foot with evidence of osteo    Will follow and add antibiotics based on results      3 phase bone scan pending, probably of no help, but may help determine extent of osteo    Can see in the office in 2 weeks and review options              I discussed the patients findings and my recommendations with the patient    Rishabh Kahn MD  9/22/2017

## 2017-09-22 NOTE — PROGRESS NOTES
Continued Stay Note  [facility]     Patient Name: Romy Arias  MRN: [id number]  Today's Date: 9/22/2017    Admit Date: 9/20/2017          Discharge Plan     Consent obtained for the participation in the [facility] Transitions Program. Sherri Mcadams RN                 Discharge Codes     None        Expected Discharge Date and Time     Expected Discharge Date Expected Discharge Time    Sep 23, 2017             Sherri Mcadams RN

## 2017-09-22 NOTE — PLAN OF CARE
Problem: Patient Care Overview (Adult)  Goal: Plan of Care Review  Outcome: Ongoing (interventions implemented as appropriate)    09/22/17 1131   Coping/Psychosocial Response Interventions   Plan Of Care Reviewed With patient  (son's girlfiriend present)   Patient Care Overview   Progress progress toward functional goals as expected   Outcome Evaluation   Outcome Summary/Follow up Plan Dys & S/L tx. Pt nearing her cognitive baseline, still has difficulty w/ attention. Continues to be distracted by leg discomfort and has trouble focusing on therapy tasks. Provided swallowing exercise handout for pt and taught dysphagia exercises, able to complete w/ 40-50% accuracy w/ consistent cues. Pt NPO as silently aspirating all consistencies on FEES yesterday, will likely need repeat instrumental (MBS) in 3-4 days if pt continues to improve.          Problem: Inpatient SLP  Goal: Dysarthria-Patient will improve motor speech skills to allow optimal participation in care  Outcome: Ongoing (interventions implemented as appropriate)    09/20/17 1606 09/22/17 1131   Dysarthria- Optimal Particpation in Care   Dysarthria Optimal Participation in Care- SLP, Date Established 09/20/17 --    Dysarthria Optimal Participation in Care- SLP, Time to Achieve by discharge --    Dysarthria Optimal Participation in Care- SLP, Date Goal Reviewed --  09/22/17   Dysarthria Optimal Participation in Care- SLP, Outcome --  goal ongoing       Goal: Cognitive-linguistic-Patient will improve Cognitive-linguistic skills to allow optimal participation in care  Outcome: Ongoing (interventions implemented as appropriate)    09/20/17 1606 09/22/17 1131   Cognitive Linguistic- Optimal Participation in Care   Cognitive Linguistic Optimal Participation in Care- SLP, Date Established 09/20/17 --    Cognitive Linguistic Optimal Participation in Care- SLP, Time to Achieve by discharge --    Cognitive Linguistic Optimal Participation in Care- SLP, Date Goal Reviewed  --  09/22/17   Cognitive Linguistic Optimal Participation in Care- SLP, Outcome --  goal ongoing       Goal: Dysphagia- Patient will improve swallowing skills to begin to take some PO safely  Outcome: Ongoing (interventions implemented as appropriate)    09/21/17 1729 09/22/17 1131   Begin to Take Some PO Safely   Begin to Take Some PO Safely- SLP, Date Established 09/21/17 --    Begin to Take Some PO Safely- SLP, Time to Achieve by discharge --    Begin to Take Some PO Safely- SLP, Date Goal Reviewed --  09/22/17   Begin to Take Some PO Safely- SLP, Outcome --  goal ongoing

## 2017-09-22 NOTE — PROGRESS NOTES
Continued Stay Note  [facility]     Patient Name: Romy Arias  MRN: [id number]  Today's Date: 9/22/2017    Admit Date: 9/20/2017          Discharge Plan       09/22/17 1523    Case Management/Social Work Plan    Plan [facility] Stroke [facility]    Patient/Family In Agreement With Plan yes    Additional Comments Ms. Arias has a bed at [facility] tomorrow, Saturday, 9/23/17, if medically ready.  Notified Ms. Arias at the bedside.  Patient's sister, Helen, will be transporting her to [facility].  Please call report to [facility] Stroke [facility] at ph [phone].  Please have a copy of the transfer summary and any scripts in the discharge packet.  Thank you.              Discharge Codes     None        Expected Discharge Date and Time     Expected Discharge Date Expected Discharge Time    Sep 23, 2017             Mirna Garcia

## 2017-09-22 NOTE — THERAPY TREATMENT NOTE
Acute Care - Speech Language Pathology Treatment Note  [facility]     Patient Name: Romy Arias  : 1945  MRN: [id number]  Today's Date: 2017         Admit Date: 2017    Visit Dx:      ICD-10-CM ICD-9-CM   1. Dysphagia, unspecified R13.10 787.20   2. Cognitive communication deficit R41.841 799.52   3. Impaired mobility and ADLs Z74.09 799.89   4. Impaired functional mobility, balance, gait, and endurance Z74.09 V49.89   5. Oropharyngeal dysphagia R13.12 787.22     Patient Active Problem List   Diagnosis   • Chronic pain   • Dementia   • Polyneuropathy   • Peripheral vascular disease   • Acid reflux   • Chronic osteomyelitis   • Depression   • Diverticulosis   • Emphysema/COPD   • Hypertension   • Diabetes mellitus type 2, uncontrolled, with complications   • CVA (cerebral vascular accident)              Adult Rehabilitation Note       17 1030 17 1330       Rehab Assessment/Intervention    Discipline speech language pathologist  -RD      Document Type evaluation  -RD      Subjective Information agree to therapy;complains of   leg discomfort  -RD      Patient Effort, Rehab Treatment good  -RD      Recorded by [RD] Nhi Steinberg, MS CF-SLP      Pain Assessment    Pain Assessment Allen-Solo FACES  -RD      Allen-Solo FACES Pain Rating 4  -RD      Recorded by [RD] Nhi Steinberg MS CF-SLP      Improve attention    Improve attention by:  complete sustained attention task;100%;without cues  -RD     Status: Improve attention  Progressing as expected  -RD     Attention Progress  30%;with consistent cues;continue to address  -RD     Comments: Improve attention  Pt w/ poor attention to task, easily distractible, required consistent cues to re-direct.  -RD     Recorded by  [RD] Nhi Steinberg MS CF-SLP     Improve memory skills    Improve memory skills through: recalling related word lists immediately;recalling unrelated word lists immediately;repeat sentence;80%;without cues  -RD  recalling related word lists immediately;recalling unrelated word lists immediately;repeat sentence;80%;without cues  -RD     Status: Improve memory skills Progressing as expected  -RD Progressing as expected  -RD     Memory Skills Progress 80%;with inconsistent cues;continue to address  -RD 30%;with consistent cues;continue to address  -RD     Comments: Improve memory skills Nearing her baseline, per pt  -RD      Recorded by [RD] Nhi Steinberg MS CF-SLP [RD] Nhi Steinberg MS CF-SLP     Improve functional problem solving    Improve functional problem solving through: complete high level reasoning task;80%;without cues  -RD complete high level reasoning task;80%;without cues  -RD     Status: Improve functional problem solving Achieved  -RD New  -RD     Functional Problem Solving Progress 80%;without cues;continue to address  -RD continue to address  -RD     Recorded by [RD] Nhi Steinberg MS CF-SLP [RD] Nhi Steinberg MS CF-SLP     Improve articulation    Improve articulation: of specific sounds in phrases;of specific sounds in connected speech;by over-articulating at phrase level;by over-articulating in connected speech;90%;without cues  -RD of specific sounds in phrases;of specific sounds in connected speech;by over-articulating at phrase level;by over-articulating in connected speech;90%;without cues  -RD     Status: Improve articulation Progressing as expected  -RD Progressing as expected  -RD     Articulation Progress 90%;with inconsistent cues;continue to address  -RD 70%;with inconsistent cues;continue to address  -RD     Comments: Improve articulation  Dysarthria significantly improved. Taught strategies for overarticulation.   -RD     Recorded by [RD] Nhi Steinberg MS CF-SLP [RD] Nhi Steinberg MS CF-SLP     Improve oral skills    To Improve Oral Skills, patient will: Increase lip closure;Increase tongue A-P movement;Increase back of tongue control;90%;without cues  -RD       Status: Improve Oral Skills Progressing as expected  -RD      Oral Skills Progress 50%;with consistent cues;continue to adress  -RD      Comments: Improve Oral Skills back of tongue  -RD      Recorded by [BEBETO] Nhi Steinberg MS CF-SLP      Improve timing of pharyngeal response    To improve timing of pharyngeal response, patient will: Swallow in timely way using suck-swallow response;Swallow in timely way using three second prep;80%;without cues  -RD      Status: Improve timing of pharyngeal response Progressing as expected  -RD      Timing of Pharyngeal Response Progress 50%;with consistent cues;continue to adress  -RD      Recorded by [BEBETO] Nhi Steinberg MS CF-SLP      Improve closure at entrance to airway    To improve closure at entrance to airway, patient will: Complete super-supraglottic swallow;80%;without cues  -RD      Status: Improve closure at entrance to airway Progressing as expected  -RD      Closure at Entrance to Airway Progress 40%;with consistent cues;following model;continue to adress  -RD      Recorded by [BEBETO] Nhi Steinberg MS CF-SLP      Improve hyolaryngeal excursion    To improve hyolaryngeal excursion, patient will: Complete chin tuck against resistance (comment number of repetitions);80%;without cues  -RD      Status: Improve hyolaryngeal excursion Progressing as expected  -RD      Hyolaryngeal Excursion Progress 40%;with consistent cues;following model;continue to adress  -RD      Recorded by [BEBETO] Nhi Steinberg MS CF-SLP      Improve tongue base & pharyngeal wall squeeze    To improve tongue base & pharyngeal wall squeeze, patient will: Complete effortful swallow;80%;without cues  -RD      Status: Improve tongue base & pharyngeal wall squeeze Progressing as expected  -RD      Tongue Base/Pharyngeal Wall Squeeze Progress 50%;with consistent cues;following model;continue to adress  -RD      Recorded by [BEBETO] Nhi Steinberg MS CF-SLP        User Key  (r) = Recorded By, (t)  = Taken By, (c) = Cosigned By    Initials Name Effective Dates    BEBETO Angeleshel JEFFERY Steinberg, MS CF-SLP 09/18/16 -               IP SLP Goals       09/22/17 1131 09/21/17 1729 09/20/17 1606    Begin to Take Some PO Safely    Begin to Take Some PO Safely- SLP, Date Established  09/21/17  -RD     Begin to Take Some PO Safely- SLP, Time to Achieve  by discharge  -RD     Begin to Take Some PO Safely- SLP, Date Goal Reviewed 09/22/17  -RD 09/21/17  -RD     Begin to Take Some PO Safely- SLP, Outcome goal ongoing  -RD goal ongoing  -RD     Cognitive Linguistic- Optimal Participation in Care    Cognitive Linguistic Optimal Participation in Care- SLP, Date Established   09/20/17  -RD    Cognitive Linguistic Optimal Participation in Care- SLP, Time to Achieve   by discharge  -RD    Cognitive Linguistic Optimal Participation in Care- SLP, Date Goal Reviewed 09/22/17  -RD 09/21/17  -RD 09/20/17  -RD    Cognitive Linguistic Optimal Participation in Care- SLP, Outcome goal ongoing  -RD goal ongoing  -RD goal ongoing  -RD    Dysarthria- Optimal Particpation in Care    Dysarthria Optimal Participation in Care- SLP, Date Established   09/20/17  -RD    Dysarthria Optimal Participation in Care- SLP, Time to Achieve   by discharge  -RD    Dysarthria Optimal Participation in Care- SLP, Date Goal Reviewed 09/22/17  -RD 09/21/17  -RD 09/20/17  -RD    Dysarthria Optimal Participation in Care- SLP, Outcome goal ongoing  -RD goal ongoing  -RD goal ongoing  -RD      User Key  (r) = Recorded By, (t) = Taken By, (c) = Cosigned By    Initials Name Provider Type    BEBETO Nhi GIL Shyannmarilou, MS CF-SLP Speech and Language Pathologist          EDUCATION  The patient has been educated in the following areas:   Cognitive Impairment Communication Impairment.    SLP Recommendation and Plan  SLP Diagnosis: Mild dysarthria c/b imprecise articulation. Speech was 90% intelligible in conversation. Mild cognitive-linguistic deficit c/b difficulty w/ immediate memory  recall, attention, and high level reasoning/problem solving. Pt reports that these deficits are not at baseline and new since this admission. Expressive and receptive language, reading, and writing were all found to be WFL.      Rehab Potential: good, to achieve stated therapy goals  Criteria for Skilled Therapeutic Interventions Met: skilled criteria for cognitive linguistic intervention met, skilled criteria for speech language intervention met        Therapy Frequency: 5 times/wk  Predicted Duration of Therapy Intervention (days/wks): until discharge       Plan of Care Review  Plan Of Care Reviewed With: patient (son's girlfiriend present)  Progress: progress toward functional goals as expected  Outcome Summary/Follow up Plan: Dys & S/L tx. Pt nearing her cognitive baseline, still has difficulty w/ attention. Continues to be distracted by leg discomfort and has trouble focusing on therapy tasks. Provided swallowing exercise handout for pt and taught dysphagia exercises, able to complete w/ 40-50% accuracy w/ consistent cues. Pt NPO as aspirating all consistencies on FEES yesterday, will likely need repeat instrumental in 3-4 days.           Time Calculation:         Time Calculation- SLP       09/22/17 1140          Time Calculation- SLP    SLP Start Time 1030  -RD      SLP Received On 09/22/17  -RD        User Key  (r) = Recorded By, (t) = Taken By, (c) = Cosigned By    Initials Name Provider Type    BEBETO Steinberg MS CF-SLP Speech and Language Pathologist          Therapy Charges for Today     Code Description Service Date Service Provider Modifiers Qty    48424570672 HC ST EVAL ORAL PHARYNG SWALLOW 4 9/21/2017 MS PONCHO Riojas-SLP GN 1    34691878914 HC ST TREATMENT SPEECH 2 9/21/2017 Nhi Steinberg MS CF-SLP GN 1    84724903594 HC ST FIBEROPTIC ENDO EVAL SWALL 8 9/21/2017 Nhi Steinberg MS CF-SLP GN 1    03249316879 HC ST TREATMENT SWALLOW 2 9/22/2017 Nhi Steinberg MS CF-SLP GN 1     [id number] [facility] TREATMENT SPEECH 2 2017 Nhi Steinberg MS CF-SLP GN 1               Nhi Steinberg MS CF-SLP  2017 and Acute Care - Speech Language Pathology   Swallow Treatment Note [facility]     Patient Name: Romy Arias  : 1945  MRN: [id number]  Today's Date: 2017  Onset of Illness/Injury or Date of Surgery Date: 17            Admit Date: 2017    Visit Dx:      ICD-10-CM ICD-9-CM   1. Dysphagia, unspecified R13.10 787.20   2. Cognitive communication deficit R41.841 799.52   3. Impaired mobility and ADLs Z74.09 799.89   4. Impaired functional mobility, balance, gait, and endurance Z74.09 V49.89   5. Oropharyngeal dysphagia R13.12 787.22     Patient Active Problem List   Diagnosis   • Chronic pain   • Dementia   • Polyneuropathy   • Peripheral vascular disease   • Acid reflux   • Chronic osteomyelitis   • Depression   • Diverticulosis   • Emphysema/COPD   • Hypertension   • Diabetes mellitus type 2, uncontrolled, with complications   • CVA (cerebral vascular accident)             Adult Rehabilitation Note       17 1030 17 1330       Rehab Assessment/Intervention    Discipline speech language pathologist  -RD      Document Type evaluation  -RD      Subjective Information agree to therapy;complains of   leg discomfort  -RD      Patient Effort, Rehab Treatment good  -RD      Recorded by [RD] Nhi Steinberg MS CF-SLP      Pain Assessment    Pain Assessment Allen-Solo FACES  -RD      Allen-Solo FACES Pain Rating 4  -RD      Recorded by [RD] Nhi Steinberg MS CF-SLP      Improve attention    Improve attention by:  complete sustained attention task;100%;without cues  -RD     Status: Improve attention  Progressing as expected  -RD     Attention Progress  30%;with consistent cues;continue to address  -RD     Comments: Improve attention  Pt w/ poor attention to task, easily distractible, required consistent cues to re-direct.  -RD     Recorded by  [RD]  Nhi Steinberg MS CF-SLP     Improve memory skills    Improve memory skills through: recalling related word lists immediately;recalling unrelated word lists immediately;repeat sentence;80%;without cues  -RD recalling related word lists immediately;recalling unrelated word lists immediately;repeat sentence;80%;without cues  -RD     Status: Improve memory skills Progressing as expected  -RD Progressing as expected  -RD     Memory Skills Progress 80%;with inconsistent cues;continue to address  -RD 30%;with consistent cues;continue to address  -RD     Comments: Improve memory skills Nearing her baseline, per pt  -RD      Recorded by [RD] Nhi Steinberg MS CF-SLP [RD] Nhi Steinberg MS CF-SLP     Improve functional problem solving    Improve functional problem solving through: complete high level reasoning task;80%;without cues  -RD complete high level reasoning task;80%;without cues  -RD     Status: Improve functional problem solving Achieved  -RD New  -RD     Functional Problem Solving Progress 80%;without cues;continue to address  -RD continue to address  -RD     Recorded by [RD] Nhi Steinberg MS CF-SLP [RD] Nhi Steinberg, MS CF-SLP     Improve articulation    Improve articulation: of specific sounds in phrases;of specific sounds in connected speech;by over-articulating at phrase level;by over-articulating in connected speech;90%;without cues  -RD of specific sounds in phrases;of specific sounds in connected speech;by over-articulating at phrase level;by over-articulating in connected speech;90%;without cues  -RD     Status: Improve articulation Progressing as expected  -RD Progressing as expected  -RD     Articulation Progress 90%;with inconsistent cues;continue to address  -RD 70%;with inconsistent cues;continue to address  -RD     Comments: Improve articulation  Dysarthria significantly improved. Taught strategies for overarticulation.   -RD     Recorded by [RD] Nhi Steinberg MS CF-SLP  [BEBETO] Nhi Steinberg MS CF-SLP     Improve oral skills    To Improve Oral Skills, patient will: Increase lip closure;Increase tongue A-P movement;Increase back of tongue control;90%;without cues  -RD      Status: Improve Oral Skills Progressing as expected  -RD      Oral Skills Progress 50%;with consistent cues;continue to adress  -RD      Comments: Improve Oral Skills back of tongue  -RD      Recorded by [BEBETO] Nhi Steinberg MS CF-SLP      Improve timing of pharyngeal response    To improve timing of pharyngeal response, patient will: Swallow in timely way using suck-swallow response;Swallow in timely way using three second prep;80%;without cues  -RD      Status: Improve timing of pharyngeal response Progressing as expected  -RD      Timing of Pharyngeal Response Progress 50%;with consistent cues;continue to adress  -RD      Recorded by [BEBETO] Nhi Steinberg MS CF-SLP      Improve closure at entrance to airway    To improve closure at entrance to airway, patient will: Complete super-supraglottic swallow;80%;without cues  -RD      Status: Improve closure at entrance to airway Progressing as expected  -RD      Closure at Entrance to Airway Progress 40%;with consistent cues;following model;continue to adress  -RD      Recorded by [BEBETO] Nhi Steinberg MS CF-SLP      Improve hyolaryngeal excursion    To improve hyolaryngeal excursion, patient will: Complete chin tuck against resistance (comment number of repetitions);80%;without cues  -RD      Status: Improve hyolaryngeal excursion Progressing as expected  -RD      Hyolaryngeal Excursion Progress 40%;with consistent cues;following model;continue to adress  -RD      Recorded by [BEBETO] Nhi Steinberg MS CF-SLP      Improve tongue base & pharyngeal wall squeeze    To improve tongue base & pharyngeal wall squeeze, patient will: Complete effortful swallow;80%;without cues  -RD      Status: Improve tongue base & pharyngeal wall squeeze Progressing as expected   -RD      Tongue Base/Pharyngeal Wall Squeeze Progress 50%;with consistent cues;following model;continue to adress  -RD      Recorded by [BEBETO] Nhi Steinberg MS CF-SLP        User Key  (r) = Recorded By, (t) = Taken By, (c) = Cosigned By    Initials Name Effective Dates    BEBETO Steinberg MS CF-SLP 09/18/16 -                   IP SLP Goals       09/22/17 1131 09/21/17 1729 09/20/17 1606    Begin to Take Some PO Safely    Begin to Take Some PO Safely- SLP, Date Established  09/21/17  -RD     Begin to Take Some PO Safely- SLP, Time to Achieve  by discharge  -RD     Begin to Take Some PO Safely- SLP, Date Goal Reviewed 09/22/17  -RD 09/21/17  -RD     Begin to Take Some PO Safely- SLP, Outcome goal ongoing  -RD goal ongoing  -RD     Cognitive Linguistic- Optimal Participation in Care    Cognitive Linguistic Optimal Participation in Care- SLP, Date Established   09/20/17  -RD    Cognitive Linguistic Optimal Participation in Care- SLP, Time to Achieve   by discharge  -RD    Cognitive Linguistic Optimal Participation in Care- SLP, Date Goal Reviewed 09/22/17  -RD 09/21/17  -RD 09/20/17  -RD    Cognitive Linguistic Optimal Participation in Care- SLP, Outcome goal ongoing  -RD goal ongoing  -RD goal ongoing  -RD    Dysarthria- Optimal Particpation in Care    Dysarthria Optimal Participation in Care- SLP, Date Established   09/20/17  -RD    Dysarthria Optimal Participation in Care- SLP, Time to Achieve   by discharge  -RD    Dysarthria Optimal Participation in Care- SLP, Date Goal Reviewed 09/22/17  -RD 09/21/17  -RD 09/20/17  -RD    Dysarthria Optimal Participation in Care- SLP, Outcome goal ongoing  -RD goal ongoing  -RD goal ongoing  -RD      User Key  (r) = Recorded By, (t) = Taken By, (c) = Cosigned By    Initials Name Provider Type    BEBETO Steinberg MS CF-SLP Speech and Language Pathologist          EDUCATION  The patient has been educated in the following areas:   Dysphagia (Swallowing Impairment) Oral  Care/Hydration NPO rationale.    SLP Recommendation and Plan                                           Plan of Care Review  Plan Of Care Reviewed With: patient (son's girlfiriend present)  Progress: progress toward functional goals as expected  Outcome Summary/Follow up Plan: Dys & S/L tx. Pt nearing her cognitive baseline, still has difficulty w/ attention. Continues to be distracted by leg discomfort and has trouble focusing on therapy tasks. Provided swallowing exercise handout for pt and taught dysphagia exercises, able to complete w/ 40-50% accuracy w/ consistent cues. Pt NPO as aspirating all consistencies on FEES yesterday, will likely need repeat instrumental in 3-4 days.            Time Calculation:         Time Calculation- SLP       09/22/17 1140          Time Calculation- SLP    SLP Start Time 1030  -RD      SLP Received On 09/22/17  -RD        User Key  (r) = Recorded By, (t) = Taken By, (c) = Cosigned By    Initials Name Provider Type    BEBETO Steinberg, MS CF-SLP Speech and Language Pathologist          Therapy Charges for Today     Code Description Service Date Service Provider Modifiers Qty    08659743638 HC ST EVAL ORAL PHARYNG SWALLOW 4 9/21/2017 Nhi Steinberg MS CF-SLP GN 1    [id number] HC ST TREATMENT SPEECH 2 9/21/2017 Nhi Steinberg MS CF-SLP GN 1    75942902576 HC ST FIBEROPTIC ENDO EVAL SWALL 8 9/21/2017 Nhi Steinberg MS CF-SLP GN 1    93523953864 HC ST TREATMENT SWALLOW 2 9/22/2017 Nhi Steinberg MS CF-SLP GN 1    [id number] HC ST TREATMENT SPEECH 2 9/22/2017 Nhi Steinberg MS CF-SLP GN 1                 Nhi Steinberg MS CF-SLP  9/22/2017

## 2017-09-22 NOTE — PROGRESS NOTES
Daily Progress Note  Neurology     LOS: 2 days     Subjective     Chief Complaint: Right hemiparesis and dysarthria    Interval History:  No acute events overnight.      ROS: Negative for fever    Objective     Vital signs in last 24 hours:  Temp:  [96.8 °F (36 °C)-97.1 °F (36.2 °C)] 96.9 °F (36.1 °C)  Heart Rate:  [71-75] 75  Resp:  [16-18] 17  BP: (172-198)/(77-85) 182/81      Physical Exam:   General: Sitting in bed with eyes open. In NAD.     Respiratory: Respirations unlabored   CV: RRR       Neurologic Exam:   Mental status: Awake, alert, Follows commands.  Speech mildly dysarthric   CN: Right lower facial droop                      Results from last 7 days  Lab Units 09/22/17  0531   WBC 10*3/mm3 10.60   HEMOGLOBIN g/dL 13.6   HEMATOCRIT % 43.0   PLATELETS 10*3/mm3 264           Results Review:  Labs reviewed      Assessment/Plan     Active Problems:    Chronic pain    Dementia    Polyneuropathy    Peripheral vascular disease    Acid reflux    Chronic osteomyelitis    Depression    Emphysema/COPD    Hypertension    Diabetes mellitus type 2, uncontrolled, with complications    CVA (cerebral vascular accident)        1.  Acute ischemic stroke = Small left pontine infarct on MRI.Mechanism likely due to small vessel disease.  On aspirin and atorvastatin.  TTE report unremarkable. Rehab eval.        2.  Hypertension = continue meds     3.  Type 2 diabetes mellitus =  Uncontrolled. A1c 9%.  Continue meds and glycemic monitoring    4.  Hyperlipidemia = .  On atorvastatin    No further recommendations at this time.  Neuro will follow as needed.      Analy Briggs MD  09/22/17  2:17 PM

## 2017-09-22 NOTE — PROGRESS NOTES
[facility] Medicine Services  INPATIENT PROGRESS NOTE    Date of Admission: 9/20/2017  Length of Stay: 2  Primary Care Physician: Marina Bloom MD    Subjective     Chief Complaint: pain and withdrawal concern  HPI:  Patient is in pain and restless, has a clear speech and no other complaints    Review Of Systems:   Review of Systems  Patient denies headaches, fever, chills, shortness of breath, chest pain, cough, nausea or vomiting, diarrhea, rash, itching or bleeding. She says her feet are killing her and she is unable to rest      Objective      Temp:  [96.8 °F (36 °C)-97.1 °F (36.2 °C)] 96.9 °F (36.1 °C)  Heart Rate:  [71-75] 75  Resp:  [16-18] 17  BP: (172-198)/(77-85) 182/81  Physical Exam  Patient is alert and talkative in no distress lying in the bed able to get up easily  Neck is without mass or JVD  Heart is Reg wo murmur  Lungs are clear wo wheeze or crackle  Abd is soft without HSM or mass, not distended or tender  MAEW  Skin is without rash  Neurologic exam is nonfocal   Mood is appropriate      Results Review:    I have reviewed the labs, radiology results and diagnostic studies.      Results from last 7 days  Lab Units 09/22/17  0531   WBC 10*3/mm3 10.60   HEMOGLOBIN g/dL 13.6   PLATELETS 10*3/mm3 264       Results from last 7 days  Lab Units 09/22/17  0531   SODIUM mmol/L 132   POTASSIUM mmol/L 3.9   CO2 mmol/L 28.0   CREATININE mg/dL 0.80   GLUCOSE mg/dL 162*       Culture Data:   Wound Culture   Date Value Ref Range Status   09/21/2017 Culture in progress  Preliminary     Radiology Data:     I have reviewed the medications.    Assessment/Plan     Assessment/Problem List    Hospital Problem List     * (Principal)CVA (cerebral vascular accident)-small left pontine    Chronic pain    Hypertension    Diabetes mellitus type 2, uncontrolled, with complications    Dementia    Polyneuropathy    Peripheral vascular disease    Acid reflux    Chronic osteomyelitis    Depression     Emphysema/COPD        Plan  70 yo admitted 9/20 with right side weakness and dysarthria admitted for stroke evaluation, now with persistent hypertension and uncontrolled pain from being NPO for 2 days.  On asa and plavix,   Restarting oxycodone as she is on chronically, goal to get BP 160s today.  Echo OK with EF 70 and NO PFO  CTA head and neck are negative  Patient chapof go home tomorrow with HH PT and OT  Cleared to eat today with normal speech and neuro exam.  DVT prophylaxis:start Kristine Sanchez MD 09/22/17 2:56 PM

## 2017-09-22 NOTE — PLAN OF CARE
Problem: Patient Care Overview (Adult)  Goal: Plan of Care Review  Outcome: Ongoing (interventions implemented as appropriate)    Problem: Fall Risk (Adult)  Goal: Identify Related Risk Factors and Signs and Symptoms  Outcome: Ongoing (interventions implemented as appropriate)    09/21/17 2035   Fall Risk   Fall Risk: Related Risk Factors gait/mobility problems   Fall Risk: Signs and Symptoms presence of risk factors

## 2017-09-23 VITALS
RESPIRATION RATE: 18 BRPM | WEIGHT: 215 LBS | OXYGEN SATURATION: 98 % | DIASTOLIC BLOOD PRESSURE: 73 MMHG | TEMPERATURE: 97.6 F | HEART RATE: 68 BPM | BODY MASS INDEX: 32.58 KG/M2 | SYSTOLIC BLOOD PRESSURE: 181 MMHG | HEIGHT: 68 IN

## 2017-09-23 PROBLEM — I10 ACCELERATED HYPERTENSION: Status: ACTIVE | Noted: 2017-09-23

## 2017-09-23 LAB
ALBUMIN SERPL-MCNC: 3.4 G/DL (ref 3.2–4.8)
ALBUMIN/GLOB SERPL: 1.1 G/DL (ref 1.5–2.5)
ALP SERPL-CCNC: 120 U/L (ref 25–100)
ALT SERPL W P-5'-P-CCNC: 24 U/L (ref 7–40)
ANION GAP SERPL CALCULATED.3IONS-SCNC: 4 MMOL/L (ref 3–11)
AST SERPL-CCNC: 43 U/L (ref 0–33)
BILIRUB SERPL-MCNC: 0.6 MG/DL (ref 0.3–1.2)
BUN BLD-MCNC: 13 MG/DL (ref 9–23)
BUN/CREAT SERPL: 14.4 (ref 7–25)
CALCIUM SPEC-SCNC: 8.7 MG/DL (ref 8.7–10.4)
CHLORIDE SERPL-SCNC: 104 MMOL/L (ref 99–109)
CO2 SERPL-SCNC: 30 MMOL/L (ref 20–31)
CREAT BLD-MCNC: 0.9 MG/DL (ref 0.6–1.3)
DEPRECATED RDW RBC AUTO: 41.2 FL (ref 37–54)
ERYTHROCYTE [DISTWIDTH] IN BLOOD BY AUTOMATED COUNT: 13.1 % (ref 11.3–14.5)
GFR SERPL CREATININE-BSD FRML MDRD: 62 ML/MIN/1.73
GLOBULIN UR ELPH-MCNC: 3 GM/DL
GLUCOSE BLD-MCNC: 144 MG/DL (ref 70–100)
GLUCOSE BLDC GLUCOMTR-MCNC: 138 MG/DL (ref 70–130)
GLUCOSE BLDC GLUCOMTR-MCNC: 200 MG/DL (ref 70–130)
HCT VFR BLD AUTO: 43.6 % (ref 34.5–44)
HGB BLD-MCNC: 13.5 G/DL (ref 11.5–15.5)
MCH RBC QN AUTO: 26.5 PG (ref 27–31)
MCHC RBC AUTO-ENTMCNC: 31 G/DL (ref 32–36)
MCV RBC AUTO: 85.7 FL (ref 80–99)
PLATELET # BLD AUTO: 258 10*3/MM3 (ref 150–450)
PMV BLD AUTO: 9.6 FL (ref 6–12)
POTASSIUM BLD-SCNC: 3.9 MMOL/L (ref 3.5–5.5)
PROT SERPL-MCNC: 6.4 G/DL (ref 5.7–8.2)
RBC # BLD AUTO: 5.09 10*6/MM3 (ref 3.89–5.14)
SODIUM BLD-SCNC: 138 MMOL/L (ref 132–146)
WBC NRBC COR # BLD: 9.56 10*3/MM3 (ref 3.5–10.8)

## 2017-09-23 PROCEDURE — 99239 HOSP IP/OBS DSCHRG MGMT >30: CPT | Performed by: INTERNAL MEDICINE

## 2017-09-23 PROCEDURE — 82962 GLUCOSE BLOOD TEST: CPT

## 2017-09-23 PROCEDURE — 85027 COMPLETE CBC AUTOMATED: CPT | Performed by: NURSE PRACTITIONER

## 2017-09-23 PROCEDURE — 80053 COMPREHEN METABOLIC PANEL: CPT | Performed by: NURSE PRACTITIONER

## 2017-09-23 RX ORDER — ATORVASTATIN CALCIUM 80 MG/1
80 TABLET, FILM COATED ORAL NIGHTLY
Qty: 30 TABLET | Refills: 1 | Status: SHIPPED | OUTPATIENT
Start: 2017-09-23 | End: 2021-09-14 | Stop reason: HOSPADM

## 2017-09-23 RX ORDER — AMLODIPINE BESYLATE 5 MG/1
5 TABLET ORAL
Status: DISCONTINUED | OUTPATIENT
Start: 2017-09-23 | End: 2017-09-23 | Stop reason: HOSPADM

## 2017-09-23 RX ORDER — ASPIRIN 81 MG/1
81 TABLET, CHEWABLE ORAL DAILY
Qty: 100 TABLET | Refills: 0 | Status: SHIPPED | OUTPATIENT
Start: 2017-09-24

## 2017-09-23 RX ORDER — AMLODIPINE BESYLATE 5 MG/1
5 TABLET ORAL
Qty: 30 TABLET | Refills: 1 | Status: SHIPPED | OUTPATIENT
Start: 2017-09-23 | End: 2017-12-13

## 2017-09-23 RX ADMIN — LISINOPRIL 20 MG: 20 TABLET ORAL at 08:21

## 2017-09-23 RX ADMIN — ATENOLOL 25 MG: 25 TABLET ORAL at 08:21

## 2017-09-23 RX ADMIN — ASPIRIN 81 MG 81 MG: 81 TABLET ORAL at 08:21

## 2017-09-23 RX ADMIN — INSULIN LISPRO 3 UNITS: 100 INJECTION, SOLUTION INTRAVENOUS; SUBCUTANEOUS at 11:56

## 2017-09-23 RX ADMIN — OXYCODONE HYDROCHLORIDE AND ACETAMINOPHEN 1 TABLET: 10; 325 TABLET ORAL at 02:17

## 2017-09-23 RX ADMIN — HYDROCHLOROTHIAZIDE 12.5 MG: 12.5 TABLET ORAL at 08:21

## 2017-09-23 RX ADMIN — GABAPENTIN 300 MG: 300 CAPSULE ORAL at 05:50

## 2017-09-23 RX ADMIN — LIDOCAINE 1 PATCH: 50 PATCH CUTANEOUS at 09:48

## 2017-09-23 RX ADMIN — OXYCODONE HYDROCHLORIDE 15 MG: 15 TABLET, FILM COATED, EXTENDED RELEASE ORAL at 08:21

## 2017-09-23 NOTE — PROGRESS NOTES
Continued Stay Note  [facility]     Patient Name: Romy Arias  MRN: [id number]  Today's Date: 9/23/2017    Admit Date: 9/20/2017          Discharge Plan       09/23/17 0941    Final Note    Final Note Per Kaity at [facility], Ok to accept today. See CM note.              Discharge Codes     None        Expected Discharge Date and Time     Expected Discharge Date Expected Discharge Time    Sep 23, 2017             Joan Thomas RN

## 2017-09-23 NOTE — DISCHARGE SUMMARY
[facility] Medicine Services  DISCHARGE SUMMARY       Date of Admission: 9/20/2017  Date of Discharge:  9/23/2017  Primary Care Physician: Marina Bloom MD  Consulting Physician(s)     Provider Relationship    Rishabh Kahn MD Consulting Physician    Analy Briggs MD Consulting Physician          Discharge Diagnoses:  Active Hospital Problems (** Indicates Principal Problem)    Diagnosis Date Noted   • **CVA (cerebral vascular accident)-small left pontine [I63.9] 09/20/2017   • Chronic pain [G89.29] 07/19/2016     Priority: High   • Hypertension [I10] 07/19/2016     Priority: High   • Diabetes mellitus type 2, uncontrolled, with complications [E11.8, E11.65] 08/27/2016     Priority: Medium   • Accelerated hypertension [I10] 09/23/2017   • Acid reflux [K21.9] 07/19/2016   • Chronic osteomyelitis-right great toe followed by Dr. Kahn [M86.60] 07/19/2016   • Dementia [F03.90] 07/19/2016   • Depression [F32.9] 07/19/2016   • Emphysema/COPD [J43.9] 07/19/2016   • Peripheral vascular disease [I73.9] 07/19/2016   • Polyneuropathy [G62.9] 07/19/2016      Resolved Hospital Problems    Diagnosis Date Noted Date Resolved   No resolved problems to display.       Presenting Problem/History of Present Illness  CVA (cerebral vascular accident) [I63.9]     Chief Complaint on Day of Discharge: pain and elevated BP    History of Present Illness on Day of Discharge:   Patient is doing well, able to ambulate, feeding herself. BP has remained elevated, pain is better, but still present.  She is eager to go to [facility].    Hospital Course  70 yo WF with hx of Neuropathy who awakened on date of admission at 6 am with sudden onset of RLE and RUE weakness as well as dysarthria.  She was transfer from [facility] where she was found to be in NSR and CT head was found to be negative.  She was transferred to [facility] for workup.  She has pmx of chronic neuropathic pain as well as IDDM and HTN.    Patients neurologic deficits resolved.  Her MRI revealed a lacunar infarct.  She was still clumsy with her right hand but strength had improved.  Her chronic pain was her biggest concern.   She was also only treated with low dose correction insulin with glucoses in the normal range.  She is stable and ready to be discharge her BP medications have been changed as has her insulin regimen.     Procedures Performed  Results for orders placed during the hospital encounter of 09/20/17   Adult Transthoracic Echo Complete W/ Cont if Necessary Per Protocol (With Agitated Saline)    Narrative · Left ventricular systolic function is hyperdynamic (EF > 70).  · saline study negative for pfo        Imaging Results (last 72 hours)     Procedure Component Value Units Date/Time    CT Cerebral Perfusion With & Without Contrast [[id number]] Collected:  09/20/17 1151     Updated:  09/20/17 1246    Narrative:       EXAMINATION: CT CEREBRAL PERFUSION WITH AND WITHOUT CONTRAST-09/20/2017:        INDICATION: CVA.      TECHNIQUE: CT cerebral perfusion with and without intravenous contrast  administration. Multiple parametric maps including mean transit time,  time to drain, cerebral blood flow and cerebral blood volume were  performed.     The radiation dose reduction device was turned on for each scan per the  ALARA (As Low as Reasonably Achievable) protocol.     COMPARISON: CT head 03/12/2014.     FINDINGS: No focal defect in parametric maps of mean transit time or  time to drain. Cerebral blood flow and cerebral blood volume preserved  without focal defect to suggest acute ischemia involving a specific  vascular territory or ischemic penumbra.       Impression:       Normal CT cerebral perfusion without evidence for focal  defect to suggest acute ischemia of specific vascular territory.     D:  09/20/2017  E:  09/20/2017           This report was finalized on 9/20/2017 12:43 PM by Dr. Bryce Willis.       CT Angiogram Head With & Without  Contrast [[id number]] Collected:  09/20/17 1212     Updated:  09/20/17 1246    Narrative:               Impression:       CTA of the head and neck without hemodynamically significant  stenosis, aneurysm or occlusion.     D:  09/20/2017  E:  09/20/2017     This report was finalized on 9/20/2017 12:43 PM by Dr. Bryce Willis.       CT Angiogram Neck With & Without Contrast [[id number]] Collected:  09/20/17 1212     Updated:  09/20/17 1246    Narrative:         NECK: Normal 3 vessel arch with patent great vessel origins. Right  dominant vertebral artery system demonstrating patency without  hemodynamically significant stenosis, aneurysm or occlusion. Bilateral  cervical carotid systems demonstrate severely retropharyngeal course in  the midportion. Minimal atherosclerotic involvement of the bilateral  carotid bulbs with approximately 15% right and 10% left luminal  narrowing.      Measurements performed utilizing NASCET criteria.      Head: Distal internal carotid arteries are patent without  hemodynamically significant stenosis, aneurysm or occlusion. [place] of  Montiel and branch vessels including anterior, middle and posterior  cerebral arteries patent without hemodynamically significant stenosis,  aneurysm or occlusion. Fetal origin of the right PCA noted.  Vertebrobasilar system patent without hemodynamically significant  stenosis, aneurysm or occlusion. Tortuosity and high branching pattern  of the vertebral or persistent fetal origins.        Impression:       CTA of the head and neck without hemodynamically significant  stenosis, aneurysm or occlusion.     D:  09/20/2017  E:  09/20/2017     This report was finalized on 9/20/2017 12:43 PM by Dr. Bryce Willis.       XR Toe 2+ View Right [[id number]] Collected:  09/21/17 1753     Updated:  09/21/17 1801    Narrative:       EXAMINATION: XR RIGHT TOE, 2 VIEWS - 09/21/2017     INDICATION:  R13.10-Dysphagia, unspecified; R41.841-Cognitive  communication deficit;  Z74.09-Other reduced mobility; R13.12-Dysphagia,  oropharyngeal phase.      COMPARISON: None.     FINDINGS: Cortical irregularity of the distal tuft of the distal phalanx  of the great toe with somewhat hazy margins and surrounding edema. No  discrete soft tissue emphysema is identified.           Impression:       Cortical irregularity distal phalanx great toe with  surrounding edema concerning for osteomyelitis involvement however no  soft tissue emphysema is present.     DICTATED:     09/21/2017  EDITED:         09/21/2017     This report was finalized on 9/21/2017 5:59 PM by Dr. Bryce Willis.       MRI Brain Without Contrast [[id number]] Collected:  09/21/17 1310     Updated:  09/21/17 1826    Narrative:          FINDINGS:   1. There is a focal acute lacunar infarct in the leftward aspect of the  emerson with acute restricted diffusion and low signal on the ADC mapping  exam.     There is corresponding low signal in the leftward emerson on the ADC  mapping exam indicating an acute event.     2. There is mild tonsillar ectopia with minimal crowding of the  cervicomedullary junction without high-grade stenosis. Midline  structures are otherwise unremarkable. There is mild atrophy of the  pituitary gland. Trace mastoid fluid is seen on the right. Paranasal  sinuses and conal contents are otherwise unremarkable.     3. The basilar artery and basilar summit are within normal limits. The  flow voids are unremarkable. Peripheral cortical atrophy is identified.  There is no significant central atrophy.     4.  Increased T2 and FLAIR signal is noted in the white matter and  periventricular areas suggesting microangiopathy diffusely.       Impression:       1.  Focal lacunar acute infarct in the leftward emerson with acute  restricted diffusion and low signal on ADC mapping exam. Associated  mass, edema or hemorrhage is not identified.  2.  Diffuse microangiopathic small vessel disease noted elsewhere  consistent with the patient's  "age.  3.  Edema, mass, hemorrhage or other acute focal insult is not  identified elsewhere.     D:  09/21/2017  E:  09/21/2017            This report was finalized on 9/21/2017 6:23 PM by Dr. Benjamin German MD.       NM Bone Scan 3 Phase [[id number]] Updated:  09/22/17 1433        penging bone scan results         Consults:   Consults     Date and Time Order Name Status Description    9/20/2017 1810 Inpatient Consult to Infectious Diseases Completed     9/20/2017 1145 Inpatient Consult to Neurology Completed           Pertinent Test Results:    Results from last 7 days  Lab Units 09/23/17  0649   SODIUM mmol/L 138   POTASSIUM mmol/L 3.9   CHLORIDE mmol/L 104   CO2 mmol/L 30.0   BUN mg/dL 13   CREATININE mg/dL 0.90   CALCIUM mg/dL 8.7   BILIRUBIN mg/dL 0.6   ALK PHOS U/L 120*   ALT (SGPT) U/L 24   AST (SGOT) U/L 43*   GLUCOSE mg/dL 144*       Results from last 7 days  Lab Units 09/23/17  0649 09/22/17  0531 09/21/17  0525   WBC 10*3/mm3 9.56 10.60 9.37   HEMOGLOBIN g/dL 13.5 13.6 13.7   HEMATOCRIT % 43.6 43.0 43.8   PLATELETS 10*3/mm3 258 264 284       Total Cholesterol (mg/dL)   Date/Time Value   09/21/2017 0525 152     Triglycerides (mg/dL)   Date/Time Value   09/21/2017 0525 210 (H)     HDL Cholesterol (mg/dL)   Date/Time Value   09/21/2017 0525 27 (L)     LDL Cholesterol  (mg/dL)   Date/Time Value   09/21/2017 0525 117         Condition on Discharge:fair  Physical Exam on Discharge:BP (!) 181/73 (BP Location: Left arm, Patient Position: Lying)  Pulse 68  Temp 97.6 °F (36.4 °C) (Oral)   Resp 18  Ht 68\" (172.7 cm)  Wt 215 lb (97.5 kg)  SpO2 98%  BMI 32.69 kg/m2  Physical Exam  Patient is alert and talkative in no distress at rest, feeding herself breakfast on the edge of the bed.. Slowly but successfully  Neck is without mass or JVD  Heart is Reg wo murmur  Lungs are clear wo wheeze or crackle  Abd is soft without HSM or mass  MAEW  Skin is without rash-- ulcer right great toe  Neurologic exam in nonfocal , " "speech is clear  Mood is appropriate-- though a touch ornery      Discharge Disposition  Rehab Facility or Unit (DC - External)    Discharge Medications   Romy Arias   Home Medication Instructions ADAM:[id number]    Printed on:09/23/17 0850   Medication Information                      ALPRAZolam (XANAX) 0.25 MG tablet  Take  by mouth.             amitriptyline (ELAVIL) 75 MG tablet  Take  by mouth.             amLODIPine (NORVASC) 5 MG tablet  Take 1 tablet by mouth Daily.             aspirin 81 MG chewable tablet  Chew 1 tablet Daily.             atorvastatin (LIPITOR) 80 MG tablet  Take 1 tablet by mouth Every Night.             Cholecalciferol (VITAMIN D) 2000 UNITS capsule  Take  by mouth.             docusate sodium (DULCOLAX) 100 MG capsule  Take  by mouth 3 (three) times a day.             gabapentin (NEURONTIN) 400 MG capsule  900 mg 3 (Three) Times a Day.             insulin lispro (humaLOG) 100 UNIT/ML injection  Inject 0-7 Units under the skin 4 (Four) Times a Day With Meals & at Bedtime.             Insulin Pen Needle (BD PEN NEEDLE SAUL U/F) 32G X 4 MM misc  1 each 5 (Five) Times a Day.             Insulin Syringe-Needle U-100 (BD INSULIN SYRINGE ULTRAFINE) 31G X 5/16\" 1 ML misc  1 each 3 (Three) Times a Day.             lidocaine (LIDODERM) 5 %  Apply  topically.             lisinopril-hydrochlorothiazide (PRINZIDE,ZESTORETIC) 20-12.5 MG per tablet  Take  by mouth daily.             metFORMIN (GLUCOPHAGE) 500 MG tablet  Take 1 tablet by mouth Daily.             Multiple Vitamin (MULTI-VITAMINS PO)  Take  by mouth daily.             omeprazole OTC (PRILOSEC OTC) 20 MG EC tablet  Take  by mouth daily.             ondansetron (ZOFRAN) 8 MG tablet  Take  by mouth.             oxyCODONE ER (oxyCONTIN) 15 MG tablet extended-release 12 hour  Take 15 mg by mouth Every 12 (Twelve) Hours.             oxyCODONE-acetaminophen (PERCOCET)  MG per tablet  Take 1 tablet by mouth Every 8 (Eight) Hours As " Needed for Moderate Pain .             vitamin B-12 (CYANOCOBALAMIN) 100 MCG tablet  Take  by mouth daily.                 Discharge Diet: cardiac, no concentrated sweets    Discharge Care Plan / Instructions: to [facility] for brief stay hopefully    Activity at Discharge: as tolerated    Follow-up Appointments  Future Appointments  Date Time Provider Department Center   10/3/2017 1:30 PM Shelly CLEMENTS MD [facility] None     Additional Instructions for the Follow-ups that You Need to Schedule     Discharge Follow-up with Specified Provider    As directed    To:  see Dr. Kahn   Follow Up:  2 Weeks   Follow Up Details:  Final plan for osteomyelitis of her right 1st toe                 Test Results Pending at Discharge   Order Current Status    Wound Culture Preliminary result      AND BONE SCAN     Cha Sanchez MD 09/23/17 8:50 AM    Time: 35 minutes spent on DC

## 2017-09-23 NOTE — SIGNIFICANT NOTE
Called for SBP persistently in 190s-200s. Highest . Chart reviewed. Received multiple doses of IV Dilaudid. Oxy ER restarted during the day. Restarted home lisinopril and HCTZ. Has labetalol PRN. Repeat  with .

## 2017-09-23 NOTE — PLAN OF CARE
Problem: Patient Care Overview (Adult)  Goal: Plan of Care Review  Outcome: Ongoing (interventions implemented as appropriate)    Problem: Stroke (Ischemic) (Adult)  Goal: Signs and Symptoms of Listed Potential Problems Will be Absent or Manageable (Stroke)  Outcome: Ongoing (interventions implemented as appropriate)    09/23/17 0402   Stroke (Ischemic)   Problems Assessed (Stroke (Ischemic)/TIA) all   Problems Present (Stroke (Ischemic)/TIA) none

## 2017-09-25 LAB
BACTERIA SPEC AEROBE CULT: ABNORMAL
GRAM STN SPEC: ABNORMAL

## 2017-10-03 ENCOUNTER — TELEPHONE (OUTPATIENT)
Dept: ENDOCRINOLOGY | Facility: CLINIC | Age: 72
End: 2017-10-03

## 2017-10-03 ENCOUNTER — OFFICE VISIT (OUTPATIENT)
Dept: ENDOCRINOLOGY | Facility: CLINIC | Age: 72
End: 2017-10-03

## 2017-10-03 VITALS
BODY MASS INDEX: 31.89 KG/M2 | OXYGEN SATURATION: 96 % | DIASTOLIC BLOOD PRESSURE: 74 MMHG | HEIGHT: 68 IN | HEART RATE: 64 BPM | SYSTOLIC BLOOD PRESSURE: 132 MMHG | WEIGHT: 210.4 LBS

## 2017-10-03 DIAGNOSIS — G62.9 POLYNEUROPATHY: ICD-10-CM

## 2017-10-03 DIAGNOSIS — I10 ESSENTIAL HYPERTENSION: ICD-10-CM

## 2017-10-03 DIAGNOSIS — E11.65 UNCONTROLLED TYPE 2 DIABETES MELLITUS WITH COMPLICATION, UNSPECIFIED LONG TERM INSULIN USE STATUS: Primary | ICD-10-CM

## 2017-10-03 DIAGNOSIS — M86.60 CHRONIC OSTEOMYELITIS (HCC): ICD-10-CM

## 2017-10-03 DIAGNOSIS — E11.8 UNCONTROLLED TYPE 2 DIABETES MELLITUS WITH COMPLICATION, UNSPECIFIED LONG TERM INSULIN USE STATUS: Primary | ICD-10-CM

## 2017-10-03 PROCEDURE — 99214 OFFICE O/P EST MOD 30 MIN: CPT | Performed by: INTERNAL MEDICINE

## 2017-10-03 NOTE — TELEPHONE ENCOUNTER
----- Message from Shelly CLEMENTS MD sent at 10/3/2017  1:19 PM EDT -----  Could you please start patient on the patient assistance program for [employer]. She got approved for Toujeo and Apidra is same company.

## 2017-10-03 NOTE — PROGRESS NOTES
"Subjective:     Chief Complaint   Patient presents with   • Diabetes     F/u for type 2 diabetes, pt stated she had a stroke 2 weeks ago.       Romy Arias is a 71 y.o. female who is is here today for follow-up   for Type 2 diabetes mellitus.    The initial diagnosis of diabetes was made in [phone]  Diabetic complications: peripheral neuropathy and peripheral vascular disease, CVA, chronic osteomyelitis of the big toe right foot.     Current diabetic medications include  -Basal/bolus, the doses decreased significantly during hospitalization.     Eye exam current (within one year): cataracts    Monitoring   2 times daily.    She brought her meter and log book. Glucose is 315 since she left the hospital.   Hypoglycemia: none     Nutrition:   discussed  carb consistent diet 45-60 gm carb per meal. Doesnt eat breakfast, 2-3 pm she has lunch and a snack in the afternoon. Her schedule is inconsistent.   Current diet: in general, an \"unhealthy\" diet  Current exercise: none    Foot care and dental care: discussed. Right toe osteomyelitis was diagnosed and she is going to see DR Kahn in 1 week, will need surgery.    Patient and a CVA 2 weeks ago with right sided weakness, speech and difficulty finding words. She is able to walk. Also concerned about osteomyelitis and treatment of the bone infection.       Past Medical History:   Diagnosis Date   • Arthritis    • Cancer    • Colon polyps    • Gallstone    • Hypertension    • Rheumatic fever    • Sepsis due to urinary tract infection    • Stroke      The following portions of the patient's history were reviewed and updated as appropriate: allergies, current medications, past family history, past social history, past surgical history and problem list.    MEDICATIONS    Current Outpatient Prescriptions:   •  ALPRAZolam (XANAX) 0.25 MG tablet, Take  by mouth., Disp: , Rfl:   •  amitriptyline (ELAVIL) 75 MG tablet, Take  by mouth., Disp: , Rfl:   •  amLODIPine (NORVASC) 5 MG " "tablet, Take 1 tablet by mouth Daily., Disp: 30 tablet, Rfl: 1  •  aspirin 81 MG chewable tablet, Chew 1 tablet Daily., Disp: 100 tablet, Rfl: 0  •  atorvastatin (LIPITOR) 80 MG tablet, Take 1 tablet by mouth Every Night., Disp: 30 tablet, Rfl: 1  •  Cholecalciferol (VITAMIN D) 2000 UNITS capsule, Take  by mouth., Disp: , Rfl:   •  docusate sodium (DULCOLAX) 100 MG capsule, Take  by mouth 3 (three) times a day., Disp: , Rfl:   •  gabapentin (NEURONTIN) 400 MG capsule, 900 mg 3 (Three) Times a Day., Disp: , Rfl: 0  •  Insulin Glulisine 100 UNIT/ML solution pen-injector, Inject 30 Units under the skin 3 (Three) Times a Day., Disp: 10 mL, Rfl: 3  •  insulin lispro (humaLOG) 100 UNIT/ML injection, Inject 0-7 Units under the skin 4 (Four) Times a Day With Meals & at Bedtime., Disp: 100 Units, Rfl: 1  •  Insulin Pen Needle (BD PEN NEEDLE SAUL U/F) 32G X 4 MM misc, 1 each 5 (Five) Times a Day., Disp: 200 each, Rfl: 6  •  Insulin Syringe-Needle U-100 (BD INSULIN SYRINGE ULTRAFINE) 31G X 5/16\" 1 ML misc, 1 each 3 (Three) Times a Day., Disp: 100 each, Rfl: 3  •  lidocaine (LIDODERM) 5 %, Apply  topically., Disp: , Rfl:   •  lisinopril-hydrochlorothiazide (PRINZIDE,ZESTORETIC) 20-12.5 MG per tablet, Take  by mouth daily., Disp: , Rfl:   •  metFORMIN (GLUCOPHAGE) 500 MG tablet, Take 1 tablet by mouth Daily., Disp: 90 tablet, Rfl: 0  •  Multiple Vitamin (MULTI-VITAMINS PO), Take  by mouth daily., Disp: , Rfl:   •  omeprazole OTC (PRILOSEC OTC) 20 MG EC tablet, Take  by mouth daily., Disp: , Rfl:   •  ondansetron (ZOFRAN) 8 MG tablet, Take  by mouth., Disp: , Rfl:   •  oxyCODONE ER (oxyCONTIN) 15 MG tablet extended-release 12 hour, Take 15 mg by mouth Every 12 (Twelve) Hours., Disp: , Rfl:   •  oxyCODONE-acetaminophen (PERCOCET)  MG per tablet, Take 1 tablet by mouth Every 8 (Eight) Hours As Needed for Moderate Pain ., Disp: , Rfl:   •  vitamin B-12 (CYANOCOBALAMIN) 100 MCG tablet, Take  by mouth daily., Disp: , Rfl: " "    Review of Systems  Review of Systems   Constitutional: Positive for fatigue and unexpected weight change.   HENT: Negative for congestion.    Eyes: Negative for visual disturbance.   Respiratory: Negative.  Negative for shortness of breath.    Cardiovascular: Negative.  Negative for chest pain and leg swelling.   Gastrointestinal: Negative for constipation, diarrhea and nausea.   Endocrine: Negative for cold intolerance, polydipsia and polyuria.   Musculoskeletal: Positive for arthralgias, back pain and gait problem. Negative for joint swelling and myalgias.   Skin: Negative for rash and wound.   Allergic/Immunologic: Positive for environmental allergies.   Neurological: Positive for speech difficulty, weakness and numbness. Negative for headaches.   Hematological: Negative.    Psychiatric/Behavioral: Positive for confusion (memory loss). Negative for behavioral problems and self-injury.        Anxiety          Objective:        /74  Pulse 64  Ht 68\" (172.7 cm)  Wt 210 lb 6.4 oz (95.4 kg)  SpO2 96%  BMI 31.99 kg/m2  Physical Exam   Constitutional: She is oriented to person, place, and time. She appears well-developed and well-nourished.   HENT:   Head: Normocephalic and atraumatic.   Neck: No tracheal deviation present. No thyromegaly present.   Cardiovascular: Normal rate, regular rhythm and normal heart sounds.    Pulmonary/Chest: Effort normal and breath sounds normal.   Musculoskeletal: Normal range of motion. She exhibits no edema, tenderness or deformity.    Romy had a diabetic foot exam performed today.    Vascular Status -  Her exam exhibits right foot vasculature normal. Her exam exhibits no right foot edema. Her exam exhibits left foot vasculature normal. Her exam exhibits no left foot edema.   Skin Integrity  -  Her right foot skin is intact (right big toe - small healed ulcer, no erythema, drainage or pain with palpation ). .  Neurological: She is alert and oriented to person, place, and " time.   Weakness of the right side of the face with lowered of the corner of the  Mouth on te right, speech difficulty.   Right sided weakness, uses cane with ambulation   Skin: Skin is warm and dry.   Psychiatric: She has a normal mood and affect. Her behavior is normal. Judgment and thought content normal.   Nursing note and vitals reviewed.          LABS AND IMAGING    Admission on 09/20/2017, Discharged on 09/23/2017   No results displayed because visit has over 200 results.                    Assessment:         Diagnoses and all orders for this visit:    Uncontrolled type 2 diabetes mellitus with complication, unspecified long term insulin use status    Polyneuropathy    Essential hypertension    Chronic osteomyelitis-right great toe followed by Dr. Kahn    Other orders  -     Insulin Glulisine 100 UNIT/ML solution pen-injector; Inject 30 Units under the skin 3 (Three) Times a Day.        Plan:   New insulin instructions provided. Hypoglycemia precautions reviewed and insulin titration explained. It is important to keep glycemic control in good shape in anticipation for foot surgery and best outcome of it. Patient has appointment with infection disease specialist next week.   We have helped her to fill out forms for the financial assistance regarding the prandial insulin Apidra.             Patient Instructions     Basal insulin Toujeo (Levemir) 30 Units daily, every 3 days increase by 5 units if glucose is > 250 in the morning, increase by 2 units if morning glucose 200-250    Meal Insulin  Novolog (Apidra)  30 units units before meals, 15 units before snack.        Correction insulin (add to meal insulin)   0 unit if BS  less than 150   2unit if BS  150 - 199   4 units if  - 249   6 units if  - 299   8 units if  - 349   10 units if BS Greater than 350              Follow up:  3 months.

## 2017-10-03 NOTE — PATIENT INSTRUCTIONS
Basal insulin Toujeo (Levemir) 30 Units daily, every 3 days increase by 5 units if glucose is > 250 in the morning, increase by 2 units if morning glucose 200-250    Meal Insulin  Novolog (Apidra)  30 units units before meals, 15 units before snack.        Correction insulin (add to meal insulin)   0 unit if BS  less than 150   2unit if BS  150 - 199   4 units if  - 249   6 units if  - 299   8 units if  - 349   10 units if BS Greater than 350

## 2017-10-09 ENCOUNTER — TELEPHONE (OUTPATIENT)
Dept: INTERNAL MEDICINE | Facility: CLINIC | Age: 72
End: 2017-10-09

## 2017-10-09 NOTE — TELEPHONE ENCOUNTER
THEY ARE CALLING ABOUT PATIENTS APRIDRA APPLICATION. THEY RECEIVED A 2015 APPLICATION FOR APREDRA WE SENT A 2015 FORM. THEY NEED THE 2017 APPLICATION FOR APREDRA. SHE HAS FAXED SEVERAL TIMES AND IS RE-FAXING THE APPLICATION AGAIN TODAY. THEY NEED IT COMPLETED AND FAXED BACK FOR 2017.

## 2017-10-10 NOTE — TELEPHONE ENCOUNTER
Returned pt's call to inform her that I do have the new application filled out but needing for her to come in and sign her signature before I can fax it. No answer. Left message requesting for her to return my call.

## 2017-10-16 ENCOUNTER — TELEPHONE (OUTPATIENT)
Dept: INTERNAL MEDICINE | Facility: CLINIC | Age: 72
End: 2017-10-16

## 2017-10-16 NOTE — TELEPHONE ENCOUNTER
Returned pt's call and informed her that I need for her to sign the new application for her [employer] pt assistance. Pt stated that she cannot drive and had requested for me to mail her the application for her to sign and she will mail it back to our office for us to fax.

## 2017-10-16 NOTE — TELEPHONE ENCOUNTER
PATIENT STATES SHE RECEIVED A CALL Friday FROM US. SHE IS RETURNING OUR CALL. SHE NEEDS YOU TO CALL HER BACK [phone]

## 2017-10-25 ENCOUNTER — LAB REQUISITION (OUTPATIENT)
Dept: LAB | Facility: HOSPITAL | Age: 72
End: 2017-10-25

## 2017-10-25 DIAGNOSIS — R30.0 DYSURIA: ICD-10-CM

## 2017-10-25 LAB
BACTERIA UR QL AUTO: ABNORMAL /HPF
BILIRUB UR QL STRIP: NEGATIVE
CLARITY UR: ABNORMAL
COLOR UR: YELLOW
GLUCOSE UR STRIP-MCNC: NEGATIVE MG/DL
HGB UR QL STRIP.AUTO: ABNORMAL
HYALINE CASTS UR QL AUTO: ABNORMAL /LPF
KETONES UR QL STRIP: NEGATIVE
LEUKOCYTE ESTERASE UR QL STRIP.AUTO: ABNORMAL
NITRITE UR QL STRIP: NEGATIVE
PH UR STRIP.AUTO: 6 [PH] (ref 5–8)
PROT UR QL STRIP: ABNORMAL
RBC # UR: ABNORMAL /HPF
REF LAB TEST METHOD: ABNORMAL
SP GR UR STRIP: 1.02 (ref 1–1.03)
SQUAMOUS #/AREA URNS HPF: ABNORMAL /HPF
UROBILINOGEN UR QL STRIP: ABNORMAL
WBC UR QL AUTO: ABNORMAL /HPF

## 2017-10-25 PROCEDURE — 81001 URINALYSIS AUTO W/SCOPE: CPT | Performed by: INTERNAL MEDICINE

## 2017-10-25 PROCEDURE — 87077 CULTURE AEROBIC IDENTIFY: CPT | Performed by: INTERNAL MEDICINE

## 2017-10-25 PROCEDURE — 87186 SC STD MICRODIL/AGAR DIL: CPT | Performed by: INTERNAL MEDICINE

## 2017-10-25 PROCEDURE — 87086 URINE CULTURE/COLONY COUNT: CPT | Performed by: INTERNAL MEDICINE

## 2017-10-27 LAB
BACTERIA SPEC AEROBE CULT: ABNORMAL

## 2017-11-09 ENCOUNTER — TELEPHONE (OUTPATIENT)
Dept: INTERNAL MEDICINE | Facility: CLINIC | Age: 72
End: 2017-11-09

## 2017-11-09 NOTE — TELEPHONE ENCOUNTER
Returned pt's call and advised her that she would need to call the physician who is doing the procedure

## 2017-11-09 NOTE — TELEPHONE ENCOUNTER
PATIENT IS CALLING TO ASK IF SHE NEEDS TO STOP HER INSULIN DOSE ON 11/10/2017 FOR HER BIOPSY THAT SHE IS HAVING DONE ON THE MORNING OF 11/10/2017/

## 2017-12-12 ENCOUNTER — TRANSCRIBE ORDERS (OUTPATIENT)
Dept: LAB | Facility: HOSPITAL | Age: 72
End: 2017-12-12

## 2017-12-12 ENCOUNTER — APPOINTMENT (OUTPATIENT)
Dept: LAB | Facility: HOSPITAL | Age: 72
End: 2017-12-12

## 2017-12-12 DIAGNOSIS — C80.1 DILATED CARDIOMYOPATHY SECONDARY TO MALIGNANCY (HCC): ICD-10-CM

## 2017-12-12 DIAGNOSIS — F03.90 SENILE DEMENTIA, UNCOMPLICATED (HCC): Primary | ICD-10-CM

## 2017-12-12 DIAGNOSIS — Z88.2 PERSONAL HISTORY OF ALLERGY TO SULFONAMIDES: ICD-10-CM

## 2017-12-12 DIAGNOSIS — I42.0 DILATED CARDIOMYOPATHY SECONDARY TO MALIGNANCY (HCC): ICD-10-CM

## 2017-12-12 DIAGNOSIS — L03.031 ABSCESS OF FIFTH TOENAIL OF RIGHT FOOT: ICD-10-CM

## 2017-12-12 DIAGNOSIS — R30.0 DYSURIA: ICD-10-CM

## 2017-12-12 LAB
ANION GAP SERPL CALCULATED.3IONS-SCNC: 7 MMOL/L (ref 3–11)
BUN BLD-MCNC: 15 MG/DL (ref 9–23)
BUN/CREAT SERPL: 15 (ref 7–25)
CALCIUM SPEC-SCNC: 9 MG/DL (ref 8.7–10.4)
CHLORIDE SERPL-SCNC: 95 MMOL/L (ref 99–109)
CO2 SERPL-SCNC: 32 MMOL/L (ref 20–31)
CREAT BLD-MCNC: 1 MG/DL (ref 0.6–1.3)
GFR SERPL CREATININE-BSD FRML MDRD: 55 ML/MIN/1.73
GLUCOSE BLD-MCNC: 217 MG/DL (ref 70–100)
POTASSIUM BLD-SCNC: 4.4 MMOL/L (ref 3.5–5.5)
SODIUM BLD-SCNC: 134 MMOL/L (ref 132–146)

## 2017-12-12 PROCEDURE — 36415 COLL VENOUS BLD VENIPUNCTURE: CPT | Performed by: INTERNAL MEDICINE

## 2017-12-12 PROCEDURE — 80048 BASIC METABOLIC PNL TOTAL CA: CPT | Performed by: INTERNAL MEDICINE

## 2017-12-13 ENCOUNTER — OFFICE VISIT (OUTPATIENT)
Dept: ENDOCRINOLOGY | Facility: CLINIC | Age: 72
End: 2017-12-13

## 2017-12-13 VITALS
HEIGHT: 68 IN | SYSTOLIC BLOOD PRESSURE: 130 MMHG | BODY MASS INDEX: 32.46 KG/M2 | HEART RATE: 64 BPM | WEIGHT: 214.2 LBS | DIASTOLIC BLOOD PRESSURE: 76 MMHG | OXYGEN SATURATION: 98 %

## 2017-12-13 DIAGNOSIS — G62.9 POLYNEUROPATHY: ICD-10-CM

## 2017-12-13 DIAGNOSIS — IMO0002 UNCONTROLLED TYPE 2 DIABETES MELLITUS WITH COMPLICATION, WITH LONG-TERM CURRENT USE OF INSULIN: Primary | ICD-10-CM

## 2017-12-13 DIAGNOSIS — M86.60 CHRONIC OSTEOMYELITIS (HCC): ICD-10-CM

## 2017-12-13 LAB
GLUCOSE BLDC GLUCOMTR-MCNC: 156 MG/DL (ref 70–130)
HBA1C MFR BLD: 10.1 %

## 2017-12-13 PROCEDURE — 99214 OFFICE O/P EST MOD 30 MIN: CPT | Performed by: INTERNAL MEDICINE

## 2017-12-13 PROCEDURE — 83036 HEMOGLOBIN GLYCOSYLATED A1C: CPT | Performed by: INTERNAL MEDICINE

## 2017-12-13 PROCEDURE — 82947 ASSAY GLUCOSE BLOOD QUANT: CPT | Performed by: INTERNAL MEDICINE

## 2017-12-13 RX ORDER — INSULIN DETEMIR 100 [IU]/ML
40 INJECTION, SOLUTION SUBCUTANEOUS NIGHTLY
Qty: 20 ML | Refills: 6 | Status: SHIPPED | OUTPATIENT
Start: 2017-12-13 | End: 2018-08-20 | Stop reason: CLARIF

## 2017-12-13 RX ORDER — GABAPENTIN 300 MG/1
CAPSULE ORAL
Status: ON HOLD | COMMUNITY
Start: 2017-12-13 | End: 2021-09-11

## 2017-12-13 RX ORDER — OXYCODONE HYDROCHLORIDE 10 MG/1
TABLET ORAL
Refills: 0 | Status: ON HOLD | COMMUNITY
Start: 2017-11-19 | End: 2021-09-11

## 2017-12-13 RX ORDER — INSULIN DETEMIR 100 [IU]/ML
INJECTION, SOLUTION SUBCUTANEOUS
Refills: 0 | COMMUNITY
Start: 2017-09-30 | End: 2017-12-13 | Stop reason: SDUPTHER

## 2017-12-13 RX ORDER — OMEPRAZOLE 20 MG/1
CAPSULE, DELAYED RELEASE ORAL
Refills: 0 | Status: ON HOLD | COMMUNITY
Start: 2017-11-15 | End: 2021-09-11

## 2017-12-13 RX ORDER — ATENOLOL 25 MG/1
25 TABLET ORAL
Refills: 0 | COMMUNITY
Start: 2017-12-07 | End: 2021-09-14 | Stop reason: HOSPADM

## 2017-12-13 NOTE — PROGRESS NOTES
"Subjective:     Chief Complaint   Patient presents with   • Diabetes     Follow Up       Romy Arias is a 71 y.o. female who is is here today for follow-up for   Type 2 diabetes mellitus.    The initial diagnosis of diabetes was made in [id number].  Diabetic complications: peripheral neuropathy and peripheral vascular disease, CVA, chronic osteomyelitis of the big toe right foot. IV abx now.    Current diabetic medications include  -Basal/bolus.     Eye exam current (within one year): cataracts    Monitoring   2 times daily.    She brought her meter and log book. Glucose is 315.   Hypoglycemia: none     Nutrition:   discussed  carb consistent diet 45-60 gm carb per meal. Doesnt eat breakfast, 2-3 pm she has lunch and a snack in the afternoon. Her schedule is inconsistent.   Current diet: in general, an \"unhealthy\" diet  Current exercise: none    Foot care and dental care: discussed. Right toe osteomyelitis was diagnosed and she is going to see DR Kahn in 1 week, will need surgery.    Patient and a CVA with right sided weakness, speech and difficulty finding words. She is able to walk with cane.   She was diagnosed with the small cell carcinoma of the back, planned for surgery.     Past Medical History:   Diagnosis Date   • Arthritis    • Cancer    • Colon polyps    • Gallstone    • Hypertension    • Rheumatic fever    • Sepsis due to urinary tract infection    • Stroke      The following portions of the patient's history were reviewed and updated as appropriate: allergies, current medications, past family history, past social history, past surgical history and problem list.    MEDICATIONS    Current Outpatient Prescriptions:   •  ALPRAZolam (XANAX) 0.25 MG tablet, Take  by mouth., Disp: , Rfl:   •  amitriptyline (ELAVIL) 75 MG tablet, Take  by mouth., Disp: , Rfl:   •  aspirin 81 MG chewable tablet, Chew 1 tablet Daily., Disp: 100 tablet, Rfl: 0  •  atenolol (TENORMIN) 50 MG tablet, , Disp: , Rfl: 0  •  " "Cholecalciferol (VITAMIN D) 2000 UNITS capsule, Take  by mouth., Disp: , Rfl:   •  docusate sodium (DULCOLAX) 100 MG capsule, Take  by mouth 3 (three) times a day., Disp: , Rfl:   •  Insulin Glulisine 100 UNIT/ML solution pen-injector, Inject 30 Units under the skin 3 (Three) Times a Day., Disp: 10 mL, Rfl: 3  •  insulin lispro (humaLOG) 100 UNIT/ML injection, Inject 0-7 Units under the skin 4 (Four) Times a Day With Meals & at Bedtime., Disp: 100 Units, Rfl: 1  •  Insulin Pen Needle (BD PEN NEEDLE SAUL U/F) 32G X 4 MM misc, 1 each 5 (Five) Times a Day., Disp: 200 each, Rfl: 6  •  Insulin Syringe-Needle U-100 (BD INSULIN SYRINGE ULTRAFINE) 31G X 5/16\" 1 ML misc, 1 each 3 (Three) Times a Day., Disp: 100 each, Rfl: 3  •  LEVEMIR 100 UNIT/ML injection, inject 20 units once daily for 21 DAYS, Disp: , Rfl: 0  •  lidocaine (LIDODERM) 5 %, Apply  topically., Disp: , Rfl:   •  lisinopril-hydrochlorothiazide (PRINZIDE,ZESTORETIC) 20-12.5 MG per tablet, Take  by mouth daily., Disp: , Rfl:   •  metFORMIN (GLUCOPHAGE) 500 MG tablet, Take 1 tablet by mouth Daily., Disp: 90 tablet, Rfl: 0  •  Multiple Vitamin (MULTI-VITAMINS PO), Take  by mouth daily., Disp: , Rfl:   •  omeprazole (priLOSEC) 20 MG capsule, , Disp: , Rfl: 0  •  ondansetron (ZOFRAN) 8 MG tablet, Take  by mouth., Disp: , Rfl:   •  vitamin B-12 (CYANOCOBALAMIN) 100 MCG tablet, Take  by mouth daily., Disp: , Rfl:   •  atorvastatin (LIPITOR) 80 MG tablet, Take 1 tablet by mouth Every Night., Disp: 30 tablet, Rfl: 1  •  gabapentin (NEURONTIN) 300 MG capsule, , Disp: , Rfl:   •  oxyCODONE (ROXICODONE) 10 MG tablet, take 1 tablet by mouth three times a day, Disp: , Rfl: 0  •  oxyCODONE-acetaminophen (PERCOCET)  MG per tablet, Take 1 tablet by mouth Every 8 (Eight) Hours As Needed for Moderate Pain ., Disp: , Rfl:     Review of Systems  Review of Systems   Constitutional: Positive for fatigue and unexpected weight change.   HENT: Negative for congestion.    Eyes: " "Negative for visual disturbance.   Respiratory: Negative.  Negative for shortness of breath.    Cardiovascular: Negative.  Negative for chest pain and leg swelling.   Gastrointestinal: Negative for constipation, diarrhea and nausea.   Endocrine: Negative for cold intolerance, polydipsia and polyuria.   Musculoskeletal: Positive for arthralgias, back pain and gait problem. Negative for joint swelling and myalgias.   Skin: Negative for rash and wound.   Allergic/Immunologic: Positive for environmental allergies.   Neurological: Positive for speech difficulty, weakness and numbness. Negative for headaches.   Hematological: Negative.    Psychiatric/Behavioral: Positive for confusion (memory loss). Negative for behavioral problems and self-injury.        Anxiety          Objective:        /76  Pulse 64  Ht 172.7 cm (68\")  Wt 97.2 kg (214 lb 3.2 oz)  SpO2 98%  BMI 32.57 kg/m2  Physical Exam   Constitutional: She is oriented to person, place, and time. She appears well-developed and well-nourished.   HENT:   Head: Normocephalic and atraumatic.   Neck: No tracheal deviation present. No thyromegaly present.   Cardiovascular: Normal rate, regular rhythm and normal heart sounds.    Pulmonary/Chest: Effort normal and breath sounds normal.   Musculoskeletal: Normal range of motion. She exhibits no edema, tenderness or deformity.    Romy had a diabetic foot exam performed today.    Vascular Status -  Her exam exhibits right foot vasculature normal. Her exam exhibits no right foot edema. Her exam exhibits left foot vasculature normal. Her exam exhibits no left foot edema.   Skin Integrity  -  Her right foot skin is intact (right big toe - small healed ulcer, no erythema, drainage or pain with palpation ). .  Neurological: She is alert and oriented to person, place, and time.   Weakness of the right side of the face with lowered of the corner of the  Mouth on te right, speech difficulty.   Right sided weakness, uses " cane with ambulation   Skin: Skin is warm and dry.   Psychiatric: She has a normal mood and affect. Her behavior is normal. Judgment and thought content normal.   Nursing note and vitals reviewed.          LABS AND IMAGING    Office Visit on 12/13/2017   Component Date Value Ref Range Status   • Glucose 12/13/2017 156* 70 - 130 mg/dL Final   • Hemoglobin A1C 12/13/2017 10.1  % Final   Transcribe Orders on 12/12/2017   Component Date Value Ref Range Status   • Glucose 12/12/2017 217* 70 - 100 mg/dL Final   • BUN 12/12/2017 15  9 - 23 mg/dL Final   • Creatinine 12/12/2017 1.00  0.60 - 1.30 mg/dL Final   • Sodium 12/12/2017 134  132 - 146 mmol/L Final   • Potassium 12/12/2017 4.4  3.5 - 5.5 mmol/L Final   • Chloride 12/12/2017 95* 99 - 109 mmol/L Final   • CO2 12/12/2017 32.0* 20.0 - 31.0 mmol/L Final   • Calcium 12/12/2017 9.0  8.7 - 10.4 mg/dL Final   • eGFR Non African Amer 12/12/2017 55* >60 mL/min/1.73 Final   • BUN/Creatinine Ratio 12/12/2017 15.0  7.0 - 25.0 Final   • Anion Gap 12/12/2017 7.0  3.0 - 11.0 mmol/L Final   Lab Requisition on 10/25/2017   Component Date Value Ref Range Status   • Urine Culture 10/25/2017 50,000-60,000 CFU/mL Escherichia coli*  Final   • Urine Culture 10/25/2017 50,000-60,000 CFU/mL Proteus mirabilis*  Final   • Color, UA 10/25/2017 Yellow  Yellow, Straw Final   • Appearance, UA 10/25/2017 Cloudy* Clear Final   • pH, UA 10/25/2017 6.0  5.0 - 8.0 Final   • Specific Gravity, UA 10/25/2017 1.018  1.001 - 1.030 Final   • Glucose, UA 10/25/2017 Negative  Negative Final   • Ketones, UA 10/25/2017 Negative  Negative Final   • Bilirubin, UA 10/25/2017 Negative  Negative Final   • Blood, UA 10/25/2017 Large (3+)* Negative Final   • Protein, UA 10/25/2017 100 mg/dL (2+)* Negative Final   • Leuk Esterase, UA 10/25/2017 Small (1+)* Negative Final   • Nitrite, UA 10/25/2017 Negative  Negative Final   • Urobilinogen, UA 10/25/2017 0.2 E.U./dL  0.2 - 1.0 E.U./dL Final   • RBC, UA 10/25/2017 0-2   None Seen, 0-2 /HPF Final   • WBC, UA 10/25/2017 Too Numerous to Count* None Seen /HPF Final   • Bacteria, UA 10/25/2017 None Seen  None Seen, Trace /HPF Final   • Squamous Epithelial Cells, UA 10/25/2017 7-12* None Seen, 0-2 /HPF Final   • Hyaline Casts, UA 10/25/2017 None Seen  0 - 6 /LPF Final   • Methodology 10/25/2017 Manual Light Microscopy   Final   Admission on 09/20/2017, Discharged on 09/23/2017   No results displayed because visit has over 200 results.                    Assessment:         Diagnoses and all orders for this visit:    Uncontrolled type 2 diabetes mellitus with complication, with long-term current use of insulin  -     POC Glucose Fingerstick  -     POC Glycosylated Hemoglobin (Hb A1C)    Polyneuropathy    Chronic osteomyelitis-right great toe followed by Dr. Kahn    Other orders  -     atenolol (TENORMIN) 50 MG tablet;   -     gabapentin (NEURONTIN) 300 MG capsule;   -     LEVEMIR 100 UNIT/ML injection; inject 20 units once daily for 21 DAYS  -     omeprazole (priLOSEC) 20 MG capsule;   -     oxyCODONE (ROXICODONE) 10 MG tablet; take 1 tablet by mouth three times a day      Plan:   New insulin instructions provided. Hypoglycemia precautions reviewed and insulin titration explained.      Patient Instructions     Basal insulin Levemir 35 Units daily, and then increase to 40 Units on Saturday. Continue increasing the dose every 3 days by 5 units if glucose is > 250 in the morning, increase by 2 units if morning glucose 200-250    Meal Insulin  Novolog (Apidra)  30 units units before meals, 15 units before snack.        Correction insulin (add to meal insulin)   0 unit if BS  less than 150   2unit if BS  150 - 199   4 units if  - 249   6 units if  - 299   8 units if  - 349   10 units if BS Greater than 350              Follow up:  3 months.

## 2017-12-13 NOTE — PATIENT INSTRUCTIONS
Results for orders placed or performed in visit on 12/13/17   POC Glucose Fingerstick   Result Value Ref Range    Glucose 156 (A) 70 - 130 mg/dL   POC Glycosylated Hemoglobin (Hb A1C)   Result Value Ref Range    Hemoglobin A1C 10.1 %       Basal insulin Levemir 35 Units daily, and then increase to 40 Units on Saturday. Continue increasing the dose every 3 days by 5 units if glucose is > 250 in the morning, increase by 2 units if morning glucose 200-250    Meal Insulin  Novolog (Apidra)  30 units units before meals, 15 units before snack.        Correction insulin (add to meal insulin)   0 unit if BS  less than 150   2unit if BS  150 - 199   4 units if  - 249   6 units if  - 299   8 units if  - 349   10 units if BS Greater than 350

## 2018-01-29 RX ORDER — SYRINGE-NEEDLE,INSULIN,0.5 ML 27GX1/2"
1 SYRINGE, EMPTY DISPOSABLE MISCELLANEOUS 3 TIMES DAILY
Qty: 100 EACH | Refills: 3 | Status: ON HOLD | OUTPATIENT
Start: 2018-01-29 | End: 2021-09-11

## 2018-01-29 NOTE — TELEPHONE ENCOUNTER
PATIENT NEEDS TO KNOW WHAT TO DO ABOUT HER INSULIN. SHE IS HAVING SURGERY ON 2/15/2018 AND IS UNSURE WHETHER OR NOT SHE SHOULD TAKE HER INSULIN.    CALL BACK [phone]

## 2018-05-04 ENCOUNTER — OFFICE VISIT (OUTPATIENT)
Dept: ENDOCRINOLOGY | Facility: CLINIC | Age: 73
End: 2018-05-04

## 2018-05-04 VITALS
WEIGHT: 205.6 LBS | HEIGHT: 68 IN | SYSTOLIC BLOOD PRESSURE: 138 MMHG | OXYGEN SATURATION: 98 % | DIASTOLIC BLOOD PRESSURE: 80 MMHG | BODY MASS INDEX: 31.16 KG/M2 | HEART RATE: 78 BPM

## 2018-05-04 DIAGNOSIS — I10 ACCELERATED HYPERTENSION: ICD-10-CM

## 2018-05-04 DIAGNOSIS — I73.9 PERIPHERAL VASCULAR DISEASE (HCC): ICD-10-CM

## 2018-05-04 DIAGNOSIS — IMO0002 UNCONTROLLED TYPE 2 DIABETES MELLITUS WITH COMPLICATION, WITH LONG-TERM CURRENT USE OF INSULIN: Primary | ICD-10-CM

## 2018-05-04 DIAGNOSIS — M86.60 CHRONIC OSTEOMYELITIS (HCC): ICD-10-CM

## 2018-05-04 DIAGNOSIS — G62.9 POLYNEUROPATHY: ICD-10-CM

## 2018-05-04 LAB
ALBUMIN SERPL-MCNC: 4 G/DL (ref 3.2–4.8)
ALBUMIN/GLOB SERPL: 1.3 G/DL (ref 1.5–2.5)
ALP SERPL-CCNC: 135 U/L (ref 25–100)
ALT SERPL W P-5'-P-CCNC: 22 U/L (ref 7–40)
ANION GAP SERPL CALCULATED.3IONS-SCNC: 7 MMOL/L (ref 3–11)
AST SERPL-CCNC: 29 U/L (ref 0–33)
BILIRUB SERPL-MCNC: 0.3 MG/DL (ref 0.3–1.2)
BUN BLD-MCNC: 17 MG/DL (ref 9–23)
BUN/CREAT SERPL: 15.5 (ref 7–25)
CALCIUM SPEC-SCNC: 9 MG/DL (ref 8.7–10.4)
CHLORIDE SERPL-SCNC: 96 MMOL/L (ref 99–109)
CO2 SERPL-SCNC: 31 MMOL/L (ref 20–31)
CREAT BLD-MCNC: 1.1 MG/DL (ref 0.6–1.3)
GFR SERPL CREATININE-BSD FRML MDRD: 49 ML/MIN/1.73
GLOBULIN UR ELPH-MCNC: 3.2 GM/DL
GLUCOSE BLD-MCNC: 220 MG/DL (ref 70–100)
GLUCOSE BLDC GLUCOMTR-MCNC: 129 MG/DL (ref 70–130)
HBA1C MFR BLD: 9.8 %
POTASSIUM BLD-SCNC: 4.7 MMOL/L (ref 3.5–5.5)
PROT SERPL-MCNC: 7.2 G/DL (ref 5.7–8.2)
SODIUM BLD-SCNC: 134 MMOL/L (ref 132–146)

## 2018-05-04 PROCEDURE — 99214 OFFICE O/P EST MOD 30 MIN: CPT | Performed by: INTERNAL MEDICINE

## 2018-05-04 PROCEDURE — 80053 COMPREHEN METABOLIC PANEL: CPT | Performed by: INTERNAL MEDICINE

## 2018-05-04 PROCEDURE — 82947 ASSAY GLUCOSE BLOOD QUANT: CPT | Performed by: INTERNAL MEDICINE

## 2018-05-04 PROCEDURE — 83036 HEMOGLOBIN GLYCOSYLATED A1C: CPT | Performed by: INTERNAL MEDICINE

## 2018-05-04 RX ORDER — MUPIROCIN CALCIUM 20 MG/G
CREAM TOPICAL
Qty: 30 G | Refills: 0 | Status: SHIPPED | OUTPATIENT
Start: 2018-05-04 | End: 2019-05-04

## 2018-05-04 NOTE — PATIENT INSTRUCTIONS
Results for orders placed or performed in visit on 12/13/17   POC Glucose Fingerstick   Result Value Ref Range    Glucose 156 (A) 70 - 130 mg/dL   POC Glycosylated Hemoglobin (Hb A1C)   Result Value Ref Range    Hemoglobin A1C 10.1 %       Basal insulin Levemir or TResiba  40 Units.     Meal Insulin  Novolog (Apidra)  30 units units before meals, 15 units before snack.        Correction insulin (add to meal insulin)   0 unit if BS  less than 150   2 unit if BS  150 - 199   4 units if  - 249   6 units if  - 299   8 units if  - 349   10 units if BS Greater than 350

## 2018-05-04 NOTE — PROGRESS NOTES
"Subjective:     Chief Complaint   Patient presents with   • Diabetes     F/u for type 2 diabetes      Romy Arias is a 72 y.o. female who is is here today for follow-up for   Type 2 diabetes mellitus.    The initial diagnosis of diabetes was made in [id number].  Diabetic complications: peripheral neuropathy and peripheral vascular disease, CVA, chronic osteomyelitis of the big toe right foot. IV abx now.    Current diabetic medications include    -Basal/bolus.     Eye exam current (within one year): cataracts    Monitoring   2 times daily.    She brought her meter and log book. Glucose is 315.   Hypoglycemia: none     Nutrition:   discussed  carb consistent diet 45-60 gm carb per meal. Doesnt eat breakfast, 2-3 pm she has lunch and a snack in the afternoon. Her schedule is inconsistent.   Current diet: in general, an \"unhealthy\" diet  Current exercise: none    Foot care and dental care: discussed. Right toe osteomyelitis was diagnosed and she is following with the infectious disease specialist - Vancomycin and rocephin.   she had surgery 2/15/2018, tumor from under the ribs removed - postop wound doesn't heal. (teratoma?)    Patient and a CVA with right sided weakness, speech and difficulty finding words. She is able to walk with cane.       Past Medical History:   Diagnosis Date   • Arthritis    • Cancer    • Colon polyps    • Gallstone    • Hypertension    • Rheumatic fever    • Sepsis due to urinary tract infection    • Stroke      The following portions of the patient's history were reviewed and updated as appropriate: allergies, current medications, past family history, past social history, past surgical history and problem list.    MEDICATIONS    Current Outpatient Prescriptions:   •  ALPRAZolam (XANAX) 0.25 MG tablet, Take  by mouth., Disp: , Rfl:   •  amitriptyline (ELAVIL) 75 MG tablet, Take  by mouth., Disp: , Rfl:   •  aspirin 81 MG chewable tablet, Chew 1 tablet Daily., Disp: 100 tablet, Rfl: 0  •  " "atenolol (TENORMIN) 50 MG tablet, , Disp: , Rfl: 0  •  atorvastatin (LIPITOR) 80 MG tablet, Take 1 tablet by mouth Every Night., Disp: 30 tablet, Rfl: 1  •  Cholecalciferol (VITAMIN D) 2000 UNITS capsule, Take  by mouth., Disp: , Rfl:   •  docusate sodium (DULCOLAX) 100 MG capsule, Take  by mouth 3 (three) times a day., Disp: , Rfl:   •  gabapentin (NEURONTIN) 300 MG capsule, , Disp: , Rfl:   •  Insulin Glulisine 100 UNIT/ML solution pen-injector, Inject 30 Units under the skin 3 (Three) Times a Day., Disp: 10 mL, Rfl: 3  •  insulin lispro (humaLOG) 100 UNIT/ML injection, Inject 0-7 Units under the skin 4 (Four) Times a Day With Meals & at Bedtime., Disp: 100 Units, Rfl: 1  •  Insulin Pen Needle (BD PEN NEEDLE SAUL U/F) 32G X 4 MM misc, 1 each 5 (Five) Times a Day., Disp: 200 each, Rfl: 6  •  Insulin Syringe-Needle U-100 (BD INSULIN SYRINGE ULTRAFINE) 31G X 5/16\" 1 ML misc, 1 each 3 (Three) Times a Day., Disp: 100 each, Rfl: 3  •  LEVEMIR 100 UNIT/ML injection, Inject 40 Units under the skin Every Night., Disp: 20 mL, Rfl: 6  •  lidocaine (LIDODERM) 5 %, Apply  topically., Disp: , Rfl:   •  lisinopril-hydrochlorothiazide (PRINZIDE,ZESTORETIC) 20-12.5 MG per tablet, Take  by mouth daily., Disp: , Rfl:   •  metFORMIN (GLUCOPHAGE) 500 MG tablet, Take 1 tablet by mouth Daily., Disp: 90 tablet, Rfl: 0  •  Multiple Vitamin (MULTI-VITAMINS PO), Take  by mouth daily., Disp: , Rfl:   •  omeprazole (priLOSEC) 20 MG capsule, , Disp: , Rfl: 0  •  ondansetron (ZOFRAN) 8 MG tablet, Take  by mouth., Disp: , Rfl:   •  oxyCODONE (ROXICODONE) 10 MG tablet, take 1 tablet by mouth three times a day, Disp: , Rfl: 0  •  vitamin B-12 (CYANOCOBALAMIN) 100 MCG tablet, Take  by mouth daily., Disp: , Rfl:     Review of Systems  Review of Systems   Constitutional: Positive for fatigue and unexpected weight change.   HENT: Negative for congestion.    Eyes: Negative for visual disturbance.   Respiratory: Negative.  Negative for shortness of " "breath.    Cardiovascular: Negative.  Negative for chest pain and leg swelling.   Gastrointestinal: Negative for constipation, diarrhea and nausea.   Endocrine: Negative for cold intolerance, polydipsia and polyuria.   Musculoskeletal: Positive for arthralgias, back pain and gait problem. Negative for joint swelling and myalgias.   Skin: Negative for rash and wound.        Postop wound is not healing.    Allergic/Immunologic: Positive for environmental allergies.   Neurological: Positive for speech difficulty, weakness and numbness. Negative for headaches.   Hematological: Negative.    Psychiatric/Behavioral: Positive for confusion (memory loss). Negative for behavioral problems and self-injury.        Anxiety          Objective:        /80   Pulse 78   Ht 172.7 cm (68\")   Wt 93.3 kg (205 lb 9.6 oz)   SpO2 98%   BMI 31.26 kg/m²   Physical Exam   Constitutional: She is oriented to person, place, and time. She appears well-developed and well-nourished.   HENT:   Head: Normocephalic and atraumatic.   Neck: No tracheal deviation present. No thyromegaly present.   Cardiovascular: Normal rate, regular rhythm and normal heart sounds.    Pulmonary/Chest: Effort normal and breath sounds normal.   Musculoskeletal: Normal range of motion. She exhibits no edema, tenderness or deformity.    Romy had a diabetic foot exam performed today.  Vascular Status -  Her right foot exhibits abnormal foot vasculature . Her right foot exhibits no edema. Her left foot exhibits abnormal foot vasculature . Her left foot exhibits no edema.  Skin Integrity  -  Her right foot skin is not intact (right big toe - small healed ulcer, no erythema, drainage or pain with palpation )..  Neurological: She is alert and oriented to person, place, and time.   Weakness of the right side of the face with lowered of the corner of the  Mouth on te right, speech difficulty.   Right sided weakness, uses cane with ambulation   Skin: Skin is warm and " dry.   Abdominal postop wound - non healed area 1 x 1.5 cm with clear edges and granulation tissue.    Psychiatric: She has a normal mood and affect. Her behavior is normal. Judgment and thought content normal.   Nursing note and vitals reviewed.          LABS AND IMAGING    No visits with results within 3 Month(s) from this visit.   Latest known visit with results is:   Office Visit on 12/13/2017   Component Date Value Ref Range Status   • Glucose 12/13/2017 156* 70 - 130 mg/dL Final   • Hemoglobin A1C 12/13/2017 10.1  % Final                 Assessment:         Diagnoses and all orders for this visit:    Uncontrolled type 2 diabetes mellitus with complication, with long-term current use of insulin  -     POC Glucose Fingerstick  -     POC Glycosylated Hemoglobin (Hb A1C)    Peripheral vascular disease    Accelerated hypertension    Polyneuropathy    Chronic osteomyelitis-right great toe followed by Dr. Kahn        Plan:   New insulin instructions provided. Hypoglycemia precautions reviewed and insulin titration explained.      Patient Instructions     Results for orders placed or performed in visit on 12/13/17   POC Glucose Fingerstick   Result Value Ref Range    Glucose 156 (A) 70 - 130 mg/dL   POC Glycosylated Hemoglobin (Hb A1C)   Result Value Ref Range    Hemoglobin A1C 10.1 %       Basal insulin Levemir 40 Units.     Meal Insulin  Novolog (Apidra)  30 units units before meals, 15 units before snack.        Correction insulin (add to meal insulin)   0 unit if BS  less than 150   2 unit if BS  150 - 199   4 units if  - 249   6 units if  - 299   8 units if  - 349   10 units if BS Greater than 350         Patient was recommended to not use steroid cream for the wound, it will impair healing. Mupirocin abx cream send to use on the wound. New insulin instructions given.      Follow up:  3 months.

## 2018-05-07 ENCOUNTER — TELEPHONE (OUTPATIENT)
Dept: ENDOCRINOLOGY | Facility: CLINIC | Age: 73
End: 2018-05-07

## 2018-05-07 NOTE — TELEPHONE ENCOUNTER
----- Message from Shelly CLEMENTS MD sent at 5/7/2018  6:48 AM EDT -----  Please call with results. Kidney function and electrolytes are normal, liver enzymes are fine.

## 2018-05-09 ENCOUNTER — TELEPHONE (OUTPATIENT)
Dept: INTERNAL MEDICINE | Facility: CLINIC | Age: 73
End: 2018-05-09

## 2018-05-09 NOTE — TELEPHONE ENCOUNTER
Returned pt's call and informed her that we are unable to mail medication samples. Rx has been sent to [facility] per pt's request

## 2018-05-09 NOTE — TELEPHONE ENCOUNTER
PATIENT IS NEEDING NOVALOG REFILLS. SHE WILL BE COMPLETELY OUT OF THIS MEDICATION TODAY. SHE NEEDS US TO MAIL HER SOME TODAY SO SHE CAN HAVE IT TOMORROW. SHE IS NOT ABLE TO COME IN TO OFFICE TO  SAMPLES. SHE CANT AFFORD MEDICATION FROM PHARMACY. SHE NEEDS US TO CONTACT HER BACK ABOUT GETTING NOVALOG REFILLS AND SAMPLES. YOU CAN REACH PATIENT BACK [phone]

## 2018-06-11 ENCOUNTER — TELEPHONE (OUTPATIENT)
Dept: INTERNAL MEDICINE | Facility: CLINIC | Age: 73
End: 2018-06-11

## 2018-08-08 ENCOUNTER — TELEPHONE (OUTPATIENT)
Dept: INTERNAL MEDICINE | Facility: CLINIC | Age: 73
End: 2018-08-08

## 2018-08-08 NOTE — TELEPHONE ENCOUNTER
PT CALLED TO LEAVE A MESSAGE STATING SHE HAS RAN OUT OF HER NOVOLOG MEDICATION. THE PT RAN OUT AS OF THIS AFTERNOON. PT NEEDING REFILL CALLED INTO [facility], NOT HER [facility]. PT WOULD LIKE CALL BACK FOR CONFIRMATION [phone]

## 2018-08-14 DIAGNOSIS — E11.65 UNCONTROLLED TYPE 2 DIABETES MELLITUS WITH COMPLICATION, UNSPECIFIED LONG TERM INSULIN USE STATUS: ICD-10-CM

## 2018-08-14 DIAGNOSIS — E11.8 UNCONTROLLED TYPE 2 DIABETES MELLITUS WITH COMPLICATION, UNSPECIFIED LONG TERM INSULIN USE STATUS: ICD-10-CM

## 2018-08-17 DIAGNOSIS — E11.8 UNCONTROLLED TYPE 2 DIABETES MELLITUS WITH COMPLICATION, UNSPECIFIED LONG TERM INSULIN USE STATUS: ICD-10-CM

## 2018-08-17 DIAGNOSIS — E11.65 UNCONTROLLED TYPE 2 DIABETES MELLITUS WITH COMPLICATION, UNSPECIFIED LONG TERM INSULIN USE STATUS: ICD-10-CM

## 2018-08-17 RX ORDER — INSULIN ASPART 100 [IU]/ML
INJECTION, SOLUTION INTRAVENOUS; SUBCUTANEOUS
Qty: 30 ML | Refills: 6 | Status: SHIPPED | OUTPATIENT
Start: 2018-08-17 | End: 2018-08-20 | Stop reason: CLARIF

## 2018-08-17 NOTE — TELEPHONE ENCOUNTER
PATIENT CALLED TO FOLLOW UP ON REQUEST FOR REFILL. SHE STATES SHE IS NOW OUT OF INSULIN AND NEEDS TO GET THIS FILLED AS SOON AS POSSIBLE.

## 2018-08-20 ENCOUNTER — TELEPHONE (OUTPATIENT)
Dept: INTERNAL MEDICINE | Facility: CLINIC | Age: 73
End: 2018-08-20

## 2018-08-20 NOTE — TELEPHONE ENCOUNTER
PATIENTS INSULIN IS GOING TO COST HER OVER $300 THIS MONTH AND SHE IS WONDERING IF WE HAVE ANY SAMPLES THAT SHE COULD COME BY AND . SHE IS COMPLETELY OUT INSULIN. PLEASE ADVISE.

## 2018-08-20 NOTE — TELEPHONE ENCOUNTER
Recommendations are in patient instructions section of the note. Ok to change Humalog or nOvolog to Reli-on Novolin R - take 30 min before meals.

## 2018-08-20 NOTE — TELEPHONE ENCOUNTER
Spoke with patient Dr. Nichole changed RX to Novolin 70/30 bid 40 units with breakfast and 40 units with dinners, and follow up appt is scheduled with Saul for follow up on rx change

## 2018-08-20 NOTE — TELEPHONE ENCOUNTER
Patient can not afford her insulin.  She has not taken her short acting for the past week.  She would like to know if there is a cheaper version.  I advised her of the [employer]s relion brand.  She would like a script for BOTH long acting and short acting sent to [facility] in [place].  Please advise and I will send both for her.

## 2018-09-04 ENCOUNTER — OFFICE VISIT (OUTPATIENT)
Dept: ENDOCRINOLOGY | Facility: CLINIC | Age: 73
End: 2018-09-04

## 2018-09-04 VITALS
SYSTOLIC BLOOD PRESSURE: 162 MMHG | OXYGEN SATURATION: 97 % | WEIGHT: 200.44 LBS | HEART RATE: 64 BPM | DIASTOLIC BLOOD PRESSURE: 72 MMHG | BODY MASS INDEX: 30.48 KG/M2

## 2018-09-04 DIAGNOSIS — Z79.4 UNCONTROLLED TYPE 2 DIABETES MELLITUS WITH HYPERGLYCEMIA, WITH LONG-TERM CURRENT USE OF INSULIN (HCC): Primary | ICD-10-CM

## 2018-09-04 DIAGNOSIS — E11.65 UNCONTROLLED TYPE 2 DIABETES MELLITUS WITH HYPERGLYCEMIA, WITH LONG-TERM CURRENT USE OF INSULIN (HCC): Primary | ICD-10-CM

## 2018-09-04 LAB
GLUCOSE BLDC GLUCOMTR-MCNC: 225 MG/DL (ref 70–130)
HBA1C MFR BLD: 9.2 %

## 2018-09-04 PROCEDURE — 82947 ASSAY GLUCOSE BLOOD QUANT: CPT | Performed by: PHYSICIAN ASSISTANT

## 2018-09-04 PROCEDURE — 99214 OFFICE O/P EST MOD 30 MIN: CPT | Performed by: PHYSICIAN ASSISTANT

## 2018-09-04 PROCEDURE — 83036 HEMOGLOBIN GLYCOSYLATED A1C: CPT | Performed by: PHYSICIAN ASSISTANT

## 2018-09-04 NOTE — PROGRESS NOTES
"Subjective:     Chief Complaint   Patient presents with   • Follow-up     DM 2       Romy Arias is a 72 y.o. female who is is here today for follow-up for   Type 2 diabetes mellitus.    The initial diagnosis of diabetes was made in [id number].  Diabetic complications: peripheral neuropathy and peripheral vascular disease, CVA, chronic osteomyelitis of the big toe right foot. IV abx now.    Current diabetic medications include    novolin 70/30    Eye exam current (within one year): cataracts    Monitoring   2 times daily.    She brought her meter and log book. Glucose is 315.   Hypoglycemia: none     Nutrition:   discussed  carb consistent diet 45-60 gm carb per meal. Doesnt eat breakfast, 2-3 pm she has lunch and a snack in the afternoon. Her schedule is inconsistent.   Current diet: in general, an \"unhealthy\" diet  Current exercise: none    Foot care and dental care: discussed. Right toe osteomyelitis was diagnosed and she is following with the infectious disease specialist - Vancomycin and rocephin.   she had surgery 2/15/2018, tumor from under the ribs removed. (teratoma?)       Insulin was changed from basal/bolus to Novolin 70/30 40U ac BID 8/20/18 due to cost.   She's taking 35-45U BID to TID. Not always taking AC. Doesn't take insulin if BS is >180.   BS log reviewed- -300, mid-day upper 100s-mid 200s, evening 200-300      Past Medical History:   Diagnosis Date   • Arthritis    • Cancer (CMS/HCC)    • Colon polyps    • Gallstone    • Hypertension    • Rheumatic fever    • Sepsis due to urinary tract infection (CMS/HCC)    • Stroke (CMS/HCC)      The following portions of the patient's history were reviewed and updated as appropriate: allergies, current medications, past family history, past social history, past surgical history and problem list.    MEDICATIONS    Current Outpatient Prescriptions:   •  ALPRAZolam (XANAX) 0.25 MG tablet, Take  by mouth., Disp: , Rfl:   •  amitriptyline (ELAVIL) 75 MG " "tablet, Take  by mouth., Disp: , Rfl:   •  aspirin 81 MG chewable tablet, Chew 1 tablet Daily., Disp: 100 tablet, Rfl: 0  •  Cholecalciferol (VITAMIN D) 2000 UNITS capsule, Take  by mouth., Disp: , Rfl:   •  docusate sodium (DULCOLAX) 100 MG capsule, Take  by mouth 3 (three) times a day., Disp: , Rfl:   •  gabapentin (NEURONTIN) 300 MG capsule, , Disp: , Rfl:   •  insulin NPH-insulin regular (NOVOLIN 70/30) (70-30) 100 UNIT/ML injection, Inject 40 units before breakfast and 40 units before before dinner, Disp: 24 each, Rfl: 01  •  Insulin Pen Needle (BD PEN NEEDLE SAUL U/F) 32G X 4 MM misc, 1 each 5 (Five) Times a Day., Disp: 200 each, Rfl: 6  •  Insulin Syringe-Needle U-100 (BD INSULIN SYRINGE ULTRAFINE) 31G X 5/16\" 1 ML misc, 1 each 3 (Three) Times a Day., Disp: 100 each, Rfl: 3  •  lidocaine (LIDODERM) 5 %, Apply  topically., Disp: , Rfl:   •  lisinopril-hydrochlorothiazide (PRINZIDE,ZESTORETIC) 20-12.5 MG per tablet, Take  by mouth daily., Disp: , Rfl:   •  metFORMIN (GLUCOPHAGE) 500 MG tablet, Take 1 tablet by mouth Daily., Disp: 90 tablet, Rfl: 0  •  metFORMIN (GLUCOPHAGE) 500 MG tablet, take 1 tablet by mouth twice a day with food, Disp: 180 tablet, Rfl: 3  •  mupirocin (BACTROBAN) 2 % cream, Apply to affected area 3 times daily, Disp: 30 g, Rfl: 0  •  omeprazole (priLOSEC) 20 MG capsule, , Disp: , Rfl: 0  •  oxyCODONE (ROXICODONE) 10 MG tablet, take 1 tablet by mouth three times a day, Disp: , Rfl: 0  •  vitamin B-12 (CYANOCOBALAMIN) 100 MCG tablet, Take  by mouth daily., Disp: , Rfl:   •  atenolol (TENORMIN) 50 MG tablet, , Disp: , Rfl: 0  •  atorvastatin (LIPITOR) 80 MG tablet, Take 1 tablet by mouth Every Night., Disp: 30 tablet, Rfl: 1  •  Multiple Vitamin (MULTI-VITAMINS PO), Take  by mouth daily., Disp: , Rfl:   •  ondansetron (ZOFRAN) 8 MG tablet, Take  by mouth., Disp: , Rfl:     Review of Systems  Review of Systems   Constitutional: Positive for fatigue and unexpected weight change.   HENT: Negative " for congestion.    Eyes: Negative for visual disturbance.   Respiratory: Negative.  Negative for shortness of breath.    Cardiovascular: Negative.  Negative for chest pain and leg swelling.   Gastrointestinal: Negative for constipation, diarrhea and nausea.   Endocrine: Negative for cold intolerance, polydipsia and polyuria.   Musculoskeletal: Positive for arthralgias, back pain and gait problem. Negative for joint swelling and myalgias.   Skin: Negative for rash and wound.        Postop wound is not healing.    Allergic/Immunologic: Positive for environmental allergies.   Neurological: Positive for speech difficulty, weakness and numbness. Negative for headaches.   Hematological: Negative.    Psychiatric/Behavioral: Positive for confusion (memory loss). Negative for behavioral problems and self-injury.        Anxiety          Objective:        /72   Pulse 64   Wt 90.9 kg (200 lb 7 oz)   SpO2 97%   BMI 30.48 kg/m²   Physical Exam   Constitutional: She is oriented to person, place, and time. She appears well-developed and well-nourished.   HENT:   Head: Normocephalic and atraumatic.   Neck: No tracheal deviation present. No thyromegaly present.   Cardiovascular: Normal rate, regular rhythm and normal heart sounds.    Pulmonary/Chest: Effort normal and breath sounds normal.   Musculoskeletal: Normal range of motion. She exhibits no edema, tenderness or deformity.    Romy had a diabetic foot exam performed today.  Vascular Status -  Her right foot exhibits abnormal foot vasculature . Her right foot exhibits no edema. Her left foot exhibits abnormal foot vasculature . Her left foot exhibits no edema.  Skin Integrity  -  Her right foot skin is not intact (right big toe - small healed ulcer, no erythema, drainage or pain with palpation )..  Neurological: She is alert and oriented to person, place, and time.   Weakness of the right side of the face with lowered of the corner of the  Mouth on te right, speech  difficulty.   Right sided weakness, uses cane with ambulation   Skin: Skin is warm and dry.   Abdominal postop wound - non healed area 1 x 1.5 cm with clear edges and granulation tissue.    Psychiatric: She has a normal mood and affect. Her behavior is normal. Judgment and thought content normal.   Nursing note and vitals reviewed.          LABS AND IMAGING    Office Visit on 09/04/2018   Component Date Value Ref Range Status   • Glucose 09/04/2018 225* 70 - 130 mg/dL Final   • Hemoglobin A1C 09/04/2018 9.2  % Final                 Assessment:         Diagnoses and all orders for this visit:    Uncontrolled type 2 diabetes mellitus with hyperglycemia, with long-term current use of insulin (CMS/[facility])  -     POC Glucose Fingerstick  -     POC Glycosylated Hemoglobin (Hb A1C)        Plan:   Basa;/bolus regimen changed to premixed insulin due to cost  Discussed importance of taking a consistent dose on insulin- advised not to guess how much insulin to take  Novolin 70/30- take 40 units ac breakfast and 40 units ac dinner. Reviewed need to take insulin 30 min before meals.  Check BS before breakfast and dinner. Bring log back to f/u.         Patient Instructions   Take your Novolin 70/30 40 units before breakfast and dinner. Keep insulin dose consistent and Dr Leon will adjust at your next appointment. Take your insulin 30 minutes before your meals.         Follow up:  F/u with dr leon 9/25/18.

## 2018-09-04 NOTE — PATIENT INSTRUCTIONS
Take your Novolin 70/30 40 units before breakfast and dinner. Keep insulin dose consistent and Dr Nichole will adjust at your next appointment. Take your insulin 30 minutes before your meals.

## 2018-09-25 ENCOUNTER — OFFICE VISIT (OUTPATIENT)
Dept: ENDOCRINOLOGY | Facility: CLINIC | Age: 73
End: 2018-09-25

## 2018-09-25 VITALS
DIASTOLIC BLOOD PRESSURE: 82 MMHG | HEIGHT: 68 IN | HEART RATE: 67 BPM | SYSTOLIC BLOOD PRESSURE: 160 MMHG | BODY MASS INDEX: 30.07 KG/M2 | OXYGEN SATURATION: 97 % | WEIGHT: 198.4 LBS

## 2018-09-25 DIAGNOSIS — I10 ACCELERATED HYPERTENSION: ICD-10-CM

## 2018-09-25 DIAGNOSIS — IMO0002 UNCONTROLLED TYPE 2 DIABETES MELLITUS WITH DIABETIC POLYNEUROPATHY, WITH LONG-TERM CURRENT USE OF INSULIN: ICD-10-CM

## 2018-09-25 DIAGNOSIS — G62.9 POLYNEUROPATHY: ICD-10-CM

## 2018-09-25 DIAGNOSIS — IMO0002 UNCONTROLLED TYPE 2 DIABETES MELLITUS WITH COMPLICATION, WITH LONG-TERM CURRENT USE OF INSULIN: Primary | ICD-10-CM

## 2018-09-25 LAB — GLUCOSE BLDC GLUCOMTR-MCNC: 190 MG/DL (ref 70–130)

## 2018-09-25 PROCEDURE — 82947 ASSAY GLUCOSE BLOOD QUANT: CPT | Performed by: INTERNAL MEDICINE

## 2018-09-25 PROCEDURE — 99214 OFFICE O/P EST MOD 30 MIN: CPT | Performed by: INTERNAL MEDICINE

## 2018-09-25 RX ORDER — NYSTATIN 100000 [USP'U]/G
POWDER TOPICAL
Refills: 0 | COMMUNITY
Start: 2018-09-19 | End: 2021-09-14 | Stop reason: HOSPADM

## 2018-09-25 NOTE — PATIENT INSTRUCTIONS
Results for orders placed or performed in visit on 09/25/18   POC Glucose Fingerstick   Result Value Ref Range    Glucose 190 (A) 70 - 130 mg/dL       Basal insulin Toujeo or Lantus  45 Units.     Meal Insulin  Apidra 30 units units before meals, 15 units before snack.        Correction insulin (add to meal insulin)   0 unit if BS  less than 150   2 unit if BS  150 - 199   4 units if  - 249   6 units if  - 299   8 units if  - 349   10 units if BS Greater than 350      __________________________________________________________________________________    Alternative therapy.     Novolog 70/30   - 40 Units in am 45 Units with supper.

## 2018-09-25 NOTE — PROGRESS NOTES
"Subjective:     Chief Complaint   Patient presents with   • Diabetes     Follow Up Feet and toes are bothering her today.  Having steriod injections in back in October       Romy Arias is a 72 y.o. female who is is here today for follow-up for   Type 2 diabetes mellitus.    The initial diagnosis of diabetes was made in [id number].  Diabetic complications: peripheral neuropathy and peripheral vascular disease, CVA, chronic osteomyelitis of the big toe right foot s/p surgery in 2/2018 and now followed by infectious disease specialists.     Current diabetic medications include premixed insulin    -Basal/bolus insulin regimen changed to premixed insulin due to cost.      Eye exam current (within one year): cataracts    Monitoring   2 times daily.    She brought her meter and log book. Glucose is 315.   Hypoglycemia: none     Nutrition:   discussed  carb consistent diet 45-60 gm carb per meal. Doesnt eat breakfast, 2-3 pm she has lunch and a snack in the afternoon. Her schedule is inconsistent.   Current diet: in general, an \"unhealthy\" diet  Current exercise: none    Patient and a CVA with right sided weakness, speech and difficulty finding words. She is able to walk with cane.   Patient ran out of her insulin and last visit she was switched to 70/30 premixed insulin Novolin 70/30. Her glucose has been variable with a lot of high and low numbers.      Past Medical History:   Diagnosis Date   • Arthritis    • Cancer (CMS/HCC)    • Colon polyps    • Gallstone    • Hypertension    • Rheumatic fever    • Sepsis due to urinary tract infection (CMS/HCC)    • Stroke (CMS/HCC)      The following portions of the patient's history were reviewed and updated as appropriate: allergies, current medications, past family history, past social history, past surgical history and problem list.    MEDICATIONS    Current Outpatient Prescriptions:   •  ALPRAZolam (XANAX) 0.25 MG tablet, Take  by mouth., Disp: , Rfl:   •  amitriptyline " "(ELAVIL) 75 MG tablet, Take  by mouth., Disp: , Rfl:   •  aspirin 81 MG chewable tablet, Chew 1 tablet Daily., Disp: 100 tablet, Rfl: 0  •  atenolol (TENORMIN) 50 MG tablet, , Disp: , Rfl: 0  •  atorvastatin (LIPITOR) 80 MG tablet, Take 1 tablet by mouth Every Night., Disp: 30 tablet, Rfl: 1  •  Cholecalciferol (VITAMIN D) 2000 UNITS capsule, Take  by mouth., Disp: , Rfl:   •  docusate sodium (DULCOLAX) 100 MG capsule, Take  by mouth 3 (three) times a day., Disp: , Rfl:   •  gabapentin (NEURONTIN) 300 MG capsule, , Disp: , Rfl:   •  insulin NPH-insulin regular (NOVOLIN 70/30) (70-30) 100 UNIT/ML injection, Inject 40 units before breakfast and 40 units before before dinner, Disp: 24 each, Rfl: 01  •  Insulin Pen Needle (BD PEN NEEDLE SAUL U/F) 32G X 4 MM misc, 1 each 5 (Five) Times a Day., Disp: 200 each, Rfl: 6  •  Insulin Syringe-Needle U-100 (BD INSULIN SYRINGE ULTRAFINE) 31G X 5/16\" 1 ML misc, 1 each 3 (Three) Times a Day., Disp: 100 each, Rfl: 3  •  lidocaine (LIDODERM) 5 %, Apply  topically., Disp: , Rfl:   •  lisinopril-hydrochlorothiazide (PRINZIDE,ZESTORETIC) 20-12.5 MG per tablet, Take  by mouth daily., Disp: , Rfl:   •  metFORMIN (GLUCOPHAGE) 500 MG tablet, take 1 tablet by mouth twice a day with food, Disp: 180 tablet, Rfl: 3  •  Multiple Vitamin (MULTI-VITAMINS PO), Take  by mouth daily., Disp: , Rfl:   •  mupirocin (BACTROBAN) 2 % cream, Apply to affected area 3 times daily, Disp: 30 g, Rfl: 0  •  NYSTATIN [id number] UNIT/GM powder, apply to affected area twice a day, Disp: , Rfl: 0  •  omeprazole (priLOSEC) 20 MG capsule, , Disp: , Rfl: 0  •  ondansetron (ZOFRAN) 8 MG tablet, Take  by mouth., Disp: , Rfl:   •  oxyCODONE (ROXICODONE) 10 MG tablet, take 1 tablet by mouth three times a day, Disp: , Rfl: 0  •  vitamin B-12 (CYANOCOBALAMIN) 100 MCG tablet, Take  by mouth daily., Disp: , Rfl:     Review of Systems  Review of Systems   Constitutional: Positive for fatigue and unexpected weight change.   HENT: " "Negative for congestion.    Eyes: Negative for visual disturbance.   Respiratory: Negative.  Negative for shortness of breath.    Cardiovascular: Negative.  Negative for chest pain and leg swelling.   Gastrointestinal: Negative for constipation, diarrhea and nausea.   Endocrine: Negative for cold intolerance, polydipsia and polyuria.   Musculoskeletal: Positive for arthralgias, back pain and gait problem. Negative for joint swelling and myalgias.   Skin: Negative for rash and wound.        Postop wound is not healing.    Allergic/Immunologic: Positive for environmental allergies.   Neurological: Positive for speech difficulty, weakness and numbness. Negative for headaches.   Hematological: Negative.    Psychiatric/Behavioral: Positive for confusion (memory loss). Negative for behavioral problems and self-injury.        Anxiety          Objective:        /82   Pulse 67   Ht 172.7 cm (68\")   Wt 90 kg (198 lb 6.4 oz)   SpO2 97%   BMI 30.17 kg/m²   Physical Exam   Constitutional: She is oriented to person, place, and time. She appears well-developed and well-nourished.   HENT:   Head: Normocephalic and atraumatic.   Neck: No tracheal deviation present. No thyromegaly present.   Cardiovascular: Normal rate, regular rhythm and normal heart sounds.    Pulmonary/Chest: Effort normal and breath sounds normal.   Musculoskeletal: Normal range of motion. She exhibits no edema, tenderness or deformity.   Neurological: She is alert and oriented to person, place, and time.   Weakness of the right side of the face with lowered of the corner of the  Mouth on te right, speech difficulty.   Right sided weakness, uses cane with ambulation   Skin: Skin is warm and dry.   Psychiatric: She has a normal mood and affect. Her behavior is normal. Judgment and thought content normal.   Nursing note and vitals reviewed.          LABS AND IMAGING    Office Visit on 09/25/2018   Component Date Value Ref Range Status   • Glucose " 09/25/2018 190* 70 - 130 mg/dL Final   Office Visit on 09/04/2018   Component Date Value Ref Range Status   • Glucose 09/04/2018 225* 70 - 130 mg/dL Final   • Hemoglobin A1C 09/04/2018 9.2  % Final                 Assessment:         Diagnoses and all orders for this visit:    Uncontrolled type 2 diabetes mellitus with complication, with long-term current use of insulin (CMS/[facility])  -     POC Glucose Fingerstick    Polyneuropathy    Accelerated hypertension    Uncontrolled type 2 diabetes mellitus with diabetic polyneuropathy, with long-term current use of insulin (CMS/[facility])    Other orders  -     NYSTATIN [id number] UNIT/GM powder; apply to affected area twice a day        Plan:   New insulin instructions provided. Hypoglycemia precautions reviewed and insulin titration explained.      09/4/2018 she was changed ot premixed insulin and presented for follow-up    Novolin 70/30- take 40 units ac breakfast and 40 units ac dinner.  Her patient assistance has approved toujeo and apidra, but patient didn't receive the medication. I have given her new instructions on the basal bolus as the numbers were better on [facility].     Patient Instructions     Results for orders placed or performed in visit on 09/25/18   POC Glucose Fingerstick   Result Value Ref Range    Glucose 190 (A) 70 - 130 mg/dL       Basal insulin Toujeo or Lantus  45 Units.     Meal Insulin  Apidra 30 units units before meals, 15 units before snack.        Correction insulin (add to meal insulin)   0 unit if BS  less than 150   2 unit if BS  150 - 199   4 units if  - 249   6 units if  - 299   8 units if  - 349   10 units if BS Greater than 350      __________________________________________________________________________________    Alternative therapy.     Novolog 70/30   - 40 Units in am 45 Units with supper.    Patient was recommended to not use steroid cream for the wound, it will impair healing. Mupirocin abx cream send to use on the wound. New  insulin instructions given.      Follow up:  3 months.

## 2018-10-09 ENCOUNTER — TELEPHONE (OUTPATIENT)
Dept: INTERNAL MEDICINE | Facility: CLINIC | Age: 73
End: 2018-10-09

## 2018-10-09 NOTE — TELEPHONE ENCOUNTER
Patient received a letter from [employer] that says her [place] will be delivered to our office.      The patient would like a phone call when they arrive so that she can come and pick it up.

## 2018-10-17 ENCOUNTER — OFFICE VISIT (OUTPATIENT)
Dept: ENDOCRINOLOGY | Facility: CLINIC | Age: 73
End: 2018-10-17

## 2018-10-17 VITALS
SYSTOLIC BLOOD PRESSURE: 160 MMHG | HEART RATE: 70 BPM | BODY MASS INDEX: 30.12 KG/M2 | WEIGHT: 198.13 LBS | OXYGEN SATURATION: 98 % | DIASTOLIC BLOOD PRESSURE: 72 MMHG

## 2018-10-17 DIAGNOSIS — E11.65 UNCONTROLLED TYPE 2 DIABETES MELLITUS WITH HYPERGLYCEMIA (HCC): Primary | ICD-10-CM

## 2018-10-17 LAB — GLUCOSE BLDC GLUCOMTR-MCNC: 400 MG/DL (ref 70–130)

## 2018-10-17 PROCEDURE — 99214 OFFICE O/P EST MOD 30 MIN: CPT | Performed by: PHYSICIAN ASSISTANT

## 2018-10-17 PROCEDURE — 82947 ASSAY GLUCOSE BLOOD QUANT: CPT | Performed by: PHYSICIAN ASSISTANT

## 2018-10-17 NOTE — PROGRESS NOTES
"Subjective:     Chief Complaint   Patient presents with   • Follow-up     DM 2      Romy Arias is a 72 y.o. female who is is here today for follow-up for   Type 2 diabetes mellitus.    The initial diagnosis of diabetes was made in [id number].  Diabetic complications: peripheral neuropathy and peripheral vascular disease, CVA, chronic osteomyelitis of the big toe right foot s/p surgery in 2/2018 and now followed by infectious disease specialists.     Current diabetic medications :    -Premixed insulin changed Basal/bolus insulin regimen last visit  -toujeo 45u before dinner. Sometimes takes 50u  -apidra 40u before breakfast and also takes random amount to correct high sugars. (generally eats breakfast and dinner)    Eye exam current (within one year): cataracts    Monitoring   2 times daily.    She brought her meter and log book. FBS mostly 200-300s, evening 200-300s.   Hypoglycemia: none     Nutrition:   discussed  carb consistent diet 45-60 gm carb per meal. Eats breakfast and dinner.    Current diet: in general, an \"unhealthy\" diet  Current exercise: none          Past Medical History:   Diagnosis Date   • Arthritis    • Cancer (CMS/HCC)    • Colon polyps    • Gallstone    • Hypertension    • Rheumatic fever    • Sepsis due to urinary tract infection (CMS/HCC)    • Stroke (CMS/HCC)      The following portions of the patient's history were reviewed and updated as appropriate: allergies, current medications, past family history, past social history, past surgical history and problem list.    MEDICATIONS    Current Outpatient Prescriptions:   •  ALPRAZolam (XANAX) 0.25 MG tablet, Take  by mouth., Disp: , Rfl:   •  amitriptyline (ELAVIL) 75 MG tablet, Take  by mouth., Disp: , Rfl:   •  aspirin 81 MG chewable tablet, Chew 1 tablet Daily., Disp: 100 tablet, Rfl: 0  •  atenolol (TENORMIN) 50 MG tablet, , Disp: , Rfl: 0  •  atorvastatin (LIPITOR) 80 MG tablet, Take 1 tablet by mouth Every Night., Disp: 30 tablet, Rfl: " "1  •  Cholecalciferol (VITAMIN D) 2000 UNITS capsule, Take  by mouth., Disp: , Rfl:   •  docusate sodium (DULCOLAX) 100 MG capsule, Take  by mouth 3 (three) times a day., Disp: , Rfl:   •  gabapentin (NEURONTIN) 300 MG capsule, , Disp: , Rfl:   •  insulin NPH-insulin regular (NOVOLIN 70/30) (70-30) 100 UNIT/ML injection, Inject 40 units before breakfast and 40 units before before dinner, Disp: 24 each, Rfl: 01  •  Insulin Pen Needle (BD PEN NEEDLE SAUL U/F) 32G X 4 MM misc, 1 each 5 (Five) Times a Day., Disp: 200 each, Rfl: 6  •  Insulin Syringe-Needle U-100 (BD INSULIN SYRINGE ULTRAFINE) 31G X 5/16\" 1 ML misc, 1 each 3 (Three) Times a Day., Disp: 100 each, Rfl: 3  •  lidocaine (LIDODERM) 5 %, Apply  topically., Disp: , Rfl:   •  lisinopril-hydrochlorothiazide (PRINZIDE,ZESTORETIC) 20-12.5 MG per tablet, Take  by mouth daily., Disp: , Rfl:   •  metFORMIN (GLUCOPHAGE) 500 MG tablet, take 1 tablet by mouth twice a day with food, Disp: 180 tablet, Rfl: 3  •  Multiple Vitamin (MULTI-VITAMINS PO), Take  by mouth daily., Disp: , Rfl:   •  mupirocin (BACTROBAN) 2 % cream, Apply to affected area 3 times daily, Disp: 30 g, Rfl: 0  •  NYSTATIN [id number] UNIT/GM powder, apply to affected area twice a day, Disp: , Rfl: 0  •  omeprazole (priLOSEC) 20 MG capsule, , Disp: , Rfl: 0  •  ondansetron (ZOFRAN) 8 MG tablet, Take  by mouth., Disp: , Rfl:   •  oxyCODONE (ROXICODONE) 10 MG tablet, take 1 tablet by mouth three times a day, Disp: , Rfl: 0  •  vitamin B-12 (CYANOCOBALAMIN) 100 MCG tablet, Take  by mouth daily., Disp: , Rfl:     Review of Systems  Review of Systems   Constitutional: Positive for fatigue and unexpected weight change.   HENT: Negative for congestion.    Eyes: Negative for visual disturbance.   Respiratory: Negative.  Negative for shortness of breath.    Cardiovascular: Negative.  Negative for chest pain and leg swelling.   Gastrointestinal: Negative for constipation, diarrhea and nausea.   Endocrine: Negative for " cold intolerance, polydipsia and polyuria.   Musculoskeletal: Positive for arthralgias, back pain and gait problem. Negative for joint swelling and myalgias.   Skin: Negative for rash and wound.        Postop wound is not healing.    Allergic/Immunologic: Positive for environmental allergies.   Neurological: Positive for speech difficulty, weakness and numbness. Negative for headaches.   Hematological: Negative.    Psychiatric/Behavioral: Positive for confusion (memory loss). Negative for behavioral problems and self-injury.        Anxiety          Objective:        /72   Pulse 70   Wt 89.9 kg (198 lb 2 oz)   SpO2 98%   BMI 30.12 kg/m²   Physical Exam   Constitutional: She is oriented to person, place, and time. She appears well-developed and well-nourished.   HENT:   Head: Normocephalic and atraumatic.   Neck: No tracheal deviation present. No thyromegaly present.   Cardiovascular: Normal rate, regular rhythm and normal heart sounds.    Pulmonary/Chest: Effort normal and breath sounds normal.   Musculoskeletal: Normal range of motion. She exhibits no edema, tenderness or deformity.   Neurological: She is alert and oriented to person, place, and time.   Weakness of the right side of the face with lowered of the corner of the  Mouth on te right, speech difficulty.   Right sided weakness, uses cane with ambulation   Skin: Skin is warm and dry.   Psychiatric: She has a normal mood and affect. Her behavior is normal. Judgment and thought content normal.   Nursing note and vitals reviewed.          LABS AND IMAGING    Office Visit on 10/17/2018   Component Date Value Ref Range Status   • Glucose 10/17/2018 400* 70 - 130 mg/dL Final   Office Visit on 09/25/2018   Component Date Value Ref Range Status   • Glucose 09/25/2018 190* 70 - 130 mg/dL Final   Office Visit on 09/04/2018   Component Date Value Ref Range Status   • Glucose 09/04/2018 225* 70 - 130 mg/dL Final   • Hemoglobin A1C 09/04/2018 9.2  % Final                  Assessment:         Diagnoses and all orders for this visit:    Uncontrolled type 2 diabetes mellitus with hyperglycemia (CMS/[facility])  -     POC Glucose Fingerstick        Plan:   Toujeo 45U QHS, not before dinner. Asked her to take a consistent amount daily.  Apidra- decrease to 30U before breakfast, add 20U before dinner. Take 15U for snacks. CF 2:50 for BS >150  Samples of apidra provided.  Will need more basal insulin but making one change at a time.     Patient Instructions     Insulin dosing:  Basal insulin - Toujeo 45 units at bedtime      Meal Insulin- Apidra 30 units before breakfast, 20 units before dinner, 15 units before snacks. Use chart below to add extra insulin for high sugars.          Correction insulin (add to meal insulin)   0 unit  if  Blood sugar (BS)  less than 150   2 unit   if BS  150 - 199   4 units if  - 249   6 units if  - 299   8 units if  - 349   10 units if -399   12 units if BS >400          Patient was recommended to not use steroid cream for the wound, it will impair healing. Mupirocin abx cream send to use on the wound. New insulin instructions given.      Follow up:  2 months.

## 2018-10-17 NOTE — PATIENT INSTRUCTIONS
Insulin dosing:  Basal insulin - Toujeo 45 units at bedtime      Meal Insulin- Apidra 30 units before breakfast, 20 units before dinner, 15 units before snacks. Use chart below to add extra insulin for high sugars.          Correction insulin (add to meal insulin)   0 unit  if  Blood sugar (BS)  less than 150   2 unit   if BS  150 - 199   4 units if  - 249   6 units if  - 299   8 units if  - 349   10 units if -399   12 units if BS >400

## 2018-12-17 ENCOUNTER — OFFICE VISIT (OUTPATIENT)
Dept: ENDOCRINOLOGY | Facility: CLINIC | Age: 73
End: 2018-12-17

## 2018-12-17 VITALS
BODY MASS INDEX: 29.7 KG/M2 | HEIGHT: 68 IN | OXYGEN SATURATION: 98 % | DIASTOLIC BLOOD PRESSURE: 70 MMHG | SYSTOLIC BLOOD PRESSURE: 148 MMHG | HEART RATE: 80 BPM | WEIGHT: 196 LBS

## 2018-12-17 DIAGNOSIS — IMO0002 DIABETES MELLITUS TYPE 2, UNCONTROLLED, WITH COMPLICATIONS: Primary | ICD-10-CM

## 2018-12-17 LAB
GLUCOSE BLDC GLUCOMTR-MCNC: 257 MG/DL (ref 70–130)
HBA1C MFR BLD: 9.2 %

## 2018-12-17 PROCEDURE — 99213 OFFICE O/P EST LOW 20 MIN: CPT | Performed by: INTERNAL MEDICINE

## 2018-12-17 PROCEDURE — 82947 ASSAY GLUCOSE BLOOD QUANT: CPT | Performed by: INTERNAL MEDICINE

## 2018-12-17 PROCEDURE — 83036 HEMOGLOBIN GLYCOSYLATED A1C: CPT | Performed by: INTERNAL MEDICINE

## 2018-12-17 NOTE — PATIENT INSTRUCTIONS
Insulin dosing:  Basal insulin - Toujeo 50 units at bedtime. In 1 week if your morning numbers are still > 180, increase by 5 units (to 55 units).       Meal Insulin- Apidra 35 units before breakfast and lunch, 20 units before dinner, 15 units before snacks.     Use chart below to add extra insulin for high sugars. Do not use 35 or 20 Units if you skip meal. Just use insulin from the table.          Correction insulin (add to meal insulin)   0 unit  if  Blood sugar (BS)  less than 150   2 unit   if BS  150 - 199   4 units if  - 249   6 units if  - 299   8 units if  - 349   10 units if -399   12 units if BS >400

## 2018-12-17 NOTE — PROGRESS NOTES
Subjective:     Chief Complaint   Patient presents with   • Type 2 diabetes mellitus     follow up      Romy Arias is a 73 y.o. female who is is here today for follow-up for   Type 2 diabetes mellitus.    The initial diagnosis of diabetes was made in [id number].  Diabetic complications: peripheral neuropathy and peripheral vascular disease, CVA, chronic osteomyelitis of the big toe right foot s/p surgery in 2/2018 and now followed by infectious disease specialists.     Current diabetic medications include Basal/bolus insulin regimen     Monitoring   2 times daily.    She brought her meter and log book. Glucose is 220-300. Hypoglycemia: had 1 episode of 30 after she administered Apidra and didn't eat breakfast.         Past Medical History:   Diagnosis Date   • Arthritis    • Cancer (CMS/HCC)    • Colon polyps    • Gallstone    • Hypertension    • Rheumatic fever    • Sepsis due to urinary tract infection (CMS/HCC)    • Stroke (CMS/HCC)      The following portions of the patient's history were reviewed and updated as appropriate: allergies, current medications, past family history, past social history, past surgical history and problem list.    MEDICATIONS    Current Outpatient Medications:   •  ALPRAZolam (XANAX) 0.25 MG tablet, Take  by mouth., Disp: , Rfl:   •  amitriptyline (ELAVIL) 75 MG tablet, Take  by mouth., Disp: , Rfl:   •  aspirin 81 MG chewable tablet, Chew 1 tablet Daily., Disp: 100 tablet, Rfl: 0  •  atenolol (TENORMIN) 50 MG tablet, , Disp: , Rfl: 0  •  atorvastatin (LIPITOR) 80 MG tablet, Take 1 tablet by mouth Every Night., Disp: 30 tablet, Rfl: 1  •  Cholecalciferol (VITAMIN D) 2000 UNITS capsule, Take  by mouth., Disp: , Rfl:   •  docusate sodium (DULCOLAX) 100 MG capsule, Take  by mouth 3 (three) times a day., Disp: , Rfl:   •  gabapentin (NEURONTIN) 300 MG capsule, , Disp: , Rfl:   •  Insulin Glargine (TOUJEO MAX SOLOSTAR) 300 UNIT/ML solution pen-injector, Inject 45 Units under the skin into  "the appropriate area as directed Every Night., Disp: , Rfl:   •  insulin glulisine (APIDRA) 100 UNIT/ML injection, Inject 35 Units under the skin into the appropriate area as directed Every Morning., Disp: , Rfl:   •  Insulin Pen Needle (BD PEN NEEDLE SAUL U/F) 32G X 4 MM misc, 1 each 5 (Five) Times a Day., Disp: 200 each, Rfl: 6  •  Insulin Syringe-Needle U-100 (BD INSULIN SYRINGE ULTRAFINE) 31G X 5/16\" 1 ML misc, 1 each 3 (Three) Times a Day., Disp: 100 each, Rfl: 3  •  lidocaine (LIDODERM) 5 %, Apply  topically., Disp: , Rfl:   •  lisinopril-hydrochlorothiazide (PRINZIDE,ZESTORETIC) 20-12.5 MG per tablet, Take  by mouth daily., Disp: , Rfl:   •  metFORMIN (GLUCOPHAGE) 500 MG tablet, take 1 tablet by mouth twice a day with food, Disp: 180 tablet, Rfl: 3  •  Multiple Vitamin (MULTI-VITAMINS PO), Take  by mouth daily., Disp: , Rfl:   •  mupirocin (BACTROBAN) 2 % cream, Apply to affected area 3 times daily, Disp: 30 g, Rfl: 0  •  NYSTATIN [id number] UNIT/GM powder, apply to affected area twice a day, Disp: , Rfl: 0  •  omeprazole (priLOSEC) 20 MG capsule, , Disp: , Rfl: 0  •  ondansetron (ZOFRAN) 8 MG tablet, Take  by mouth., Disp: , Rfl:   •  oxyCODONE (ROXICODONE) 10 MG tablet, take 1 tablet by mouth three times a day, Disp: , Rfl: 0  •  vitamin B-12 (CYANOCOBALAMIN) 100 MCG tablet, Take  by mouth daily., Disp: , Rfl:   •  insulin NPH-insulin regular (NOVOLIN 70/30) (70-30) 100 UNIT/ML injection, Inject 40 units before breakfast and 40 units before before dinner, Disp: 24 each, Rfl: 01    Review of Systems  Review of Systems   Constitutional: Positive for fatigue and unexpected weight change.   HENT: Negative for congestion.    Eyes: Negative for visual disturbance.   Respiratory: Negative.  Negative for shortness of breath.    Cardiovascular: Negative.  Negative for chest pain and leg swelling.   Gastrointestinal: Negative for constipation, diarrhea and nausea.   Endocrine: Negative for cold intolerance, polydipsia " "and polyuria.   Musculoskeletal: Positive for arthralgias, back pain and gait problem. Negative for joint swelling and myalgias.   Skin: Negative for rash and wound.        Postop wound is not healing.    Allergic/Immunologic: Positive for environmental allergies.   Neurological: Positive for speech difficulty, weakness and numbness. Negative for headaches.   Hematological: Negative.    Psychiatric/Behavioral: Positive for confusion (memory loss). Negative for behavioral problems and self-injury.        Anxiety          Objective:        /70   Pulse 80   Ht 172.7 cm (68\")   Wt 88.9 kg (196 lb)   SpO2 98%   BMI 29.80 kg/m²   Physical Exam   Constitutional: She is oriented to person, place, and time. She appears well-developed and well-nourished.   HENT:   Head: Normocephalic and atraumatic.   Neck: No tracheal deviation present. No thyromegaly present.   Cardiovascular: Normal rate, regular rhythm and normal heart sounds.   Pulmonary/Chest: Effort normal and breath sounds normal.   Musculoskeletal: Normal range of motion. She exhibits no edema, tenderness or deformity.   Neurological: She is alert and oriented to person, place, and time.   Weakness of the right side of the face with lowered of the corner of the  Mouth on te right, speech difficulty.   Right sided weakness, uses cane with ambulation   Skin: Skin is warm and dry.   Psychiatric: She has a normal mood and affect. Her behavior is normal. Judgment and thought content normal.   Nursing note and vitals reviewed.          LABS AND IMAGING    Office Visit on 12/17/2018   Component Date Value Ref Range Status   • Glucose 12/17/2018 257* 70 - 130 mg/dL Final   • Hemoglobin A1C 12/17/2018 9.2  % Final   Office Visit on 10/17/2018   Component Date Value Ref Range Status   • Glucose 10/17/2018 400* 70 - 130 mg/dL Final   Office Visit on 09/25/2018   Component Date Value Ref Range Status   • Glucose 09/25/2018 190* 70 - 130 mg/dL Final               "   Assessment:         Diagnoses and all orders for this visit:    Diabetes mellitus type 2, uncontrolled, with complications (CMS/[facility])  -     POCT Glucose  -     POC Glycosylated Hemoglobin (Hb A1C)    Other orders  -     insulin glulisine (APIDRA) 100 UNIT/ML injection; Inject 35 Units under the skin into the appropriate area as directed Every Morning.  -     Insulin Glargine (TOUJEO MAX SOLOSTAR) 300 UNIT/ML solution pen-injector; Inject 45 Units under the skin into the appropriate area as directed Every Night.        Plan:   New insulin instructions provided. Hypoglycemia precautions reviewed and insulin titration explained. I have explained that no apidra should be administered unless she is eating.      Toujeo 50U QHS, not before dinner. Asked her to take a consistent amount daily.  Apidra- decrease to 35 U before breakfast and lunch, 20U before dinner.   Take 15U for snacks.   CF 2:50 for BS >150  Patient assistance approved for insulin.    Follow up:  3 months.

## 2018-12-27 ENCOUNTER — TELEPHONE (OUTPATIENT)
Dept: INTERNAL MEDICINE | Facility: CLINIC | Age: 73
End: 2018-12-27

## 2019-03-13 ENCOUNTER — TELEPHONE (OUTPATIENT)
Dept: INTERNAL MEDICINE | Facility: CLINIC | Age: 74
End: 2019-03-13

## 2019-03-27 ENCOUNTER — TELEPHONE (OUTPATIENT)
Dept: ENDOCRINOLOGY | Facility: CLINIC | Age: 74
End: 2019-03-27

## 2019-03-27 NOTE — TELEPHONE ENCOUNTER
Attempted contact at both numbers listed in chart to notify patient that her patient assistance was in. Asked her to return call or stop by and  asap

## 2019-04-01 ENCOUNTER — TELEPHONE (OUTPATIENT)
Dept: INTERNAL MEDICINE | Facility: CLINIC | Age: 74
End: 2019-04-01

## 2019-04-01 NOTE — TELEPHONE ENCOUNTER
PATIENT RECENTLY SUFFERED A STROKE AND IS CURRENTLY I A NURSING HOME. SHE STATES THIS HAPPENED ON FEB 14, 2019. SHE STATES THAT HER BG READINGS AROUND 120. SHE ALSO STATES THAT SHE HAS LOST ABOUT 50 POUNDS. PLEASE CALL PAMELA ZAVALA [phone] WITH ANY QUESTIONS OR FOLLOW UP. PATIENT IS UNSURE WHAT SHE SHOULD DO AT THIS POINT. SHE DID NOT KNOW THE NAME OF THE FACILITY SHE IS IN BUT THEN STATED THAT IT IS A REHAB CENTER.

## 2019-04-03 NOTE — TELEPHONE ENCOUNTER
Contacted Too at number listed I message per patient request.  He stated he thought she was in [facility] until at least May and she then be transferred to [facility].  I gave him Dr. Nichole information and stated for them to contact our office if we can be of help.  Will wait for advisement from [facility] or [facility]

## 2021-01-05 NOTE — PLAN OF CARE
Problem: Patient Care Overview (Adult)  Goal: Plan of Care Review  Outcome: Ongoing (interventions implemented as appropriate)    09/21/17 0434   Coping/Psychosocial Response Interventions   Plan Of Care Reviewed With patient   Patient Care Overview   Progress progress towards functional goals is fair   Outcome Evaluation   Outcome Summary/Follow up Plan NIHSS 3. RIGHT SIDE WEAKNESS NOTED. SPEECH IS CLEAR AND UNDERSTANDABLE. NO NUMBNESS/TINGLING. VTIAL SIGNS STABLE. CONSISTENTLY PAIN IS 10/10. DILAUDID GIVEN EVERY TWO HOURS. AWATING MRI .         Problem: Stroke (Ischemic) (Adult)  Goal: Signs and Symptoms of Listed Potential Problems Will be Absent or Manageable (Stroke)  Outcome: Ongoing (interventions implemented as appropriate)    09/21/17 0434   Stroke (Ischemic)   Problems Assessed (Stroke (Ischemic)/TIA) all   Problems Present (Stroke (Ischemic)/TIA) muscle tone abnormal;eating/swallowing impairment         Problem: Fall Risk (Adult)  Goal: Absence of Falls  Outcome: Ongoing (interventions implemented as appropriate)    09/21/17 0434   Fall Risk (Adult)   Absence of Falls making progress toward outcome            Pt reports persistent lack of appetite and inability to eat for last couple of months due to nausea, vomiting, and abd pain. She eats snacks when she feels up to it but has not been cooking or eating because she "feels sick". At home, pt takes MVI, magnesium supplement, calcium, and vitamin D daily. Pt was put on a full liquid diet x7 days due to persistent vomiting? pt has just been adv to a regular diet today post-colonoscopy and expressed interest in ordering lunch.

## 2021-09-09 ENCOUNTER — APPOINTMENT (OUTPATIENT)
Dept: GENERAL RADIOLOGY | Facility: HOSPITAL | Age: 76
End: 2021-09-09

## 2021-09-09 ENCOUNTER — APPOINTMENT (OUTPATIENT)
Dept: CT IMAGING | Facility: HOSPITAL | Age: 76
End: 2021-09-09

## 2021-09-09 ENCOUNTER — HOSPITAL ENCOUNTER (INPATIENT)
Facility: HOSPITAL | Age: 76
LOS: 2 days | Discharge: NURSING, REHAB OR LONG-TERM CARE | End: 2021-09-14
Attending: EMERGENCY MEDICINE | Admitting: FAMILY MEDICINE

## 2021-09-09 DIAGNOSIS — R41.841 COGNITIVE COMMUNICATION DEFICIT: ICD-10-CM

## 2021-09-09 DIAGNOSIS — G62.9 POLYNEUROPATHY: ICD-10-CM

## 2021-09-09 DIAGNOSIS — R41.82 ALTERED MENTAL STATUS, UNSPECIFIED ALTERED MENTAL STATUS TYPE: Primary | ICD-10-CM

## 2021-09-09 DIAGNOSIS — Z86.73 HISTORY OF CVA (CEREBROVASCULAR ACCIDENT): ICD-10-CM

## 2021-09-09 DIAGNOSIS — M86.60 CHRONIC OSTEOMYELITIS (HCC): ICD-10-CM

## 2021-09-09 DIAGNOSIS — R53.1 ACUTE RIGHT-SIDED WEAKNESS: ICD-10-CM

## 2021-09-09 DIAGNOSIS — R47.81 SLURRED SPEECH: ICD-10-CM

## 2021-09-09 LAB
ALBUMIN SERPL-MCNC: 4 G/DL (ref 3.5–5.2)
ALBUMIN/GLOB SERPL: 1.4 G/DL
ALP SERPL-CCNC: 93 U/L (ref 39–117)
ALT SERPL W P-5'-P-CCNC: 17 U/L (ref 1–33)
ANION GAP SERPL CALCULATED.3IONS-SCNC: 12 MMOL/L (ref 5–15)
AST SERPL-CCNC: 23 U/L (ref 1–32)
BASE EXCESS BLDA CALC-SCNC: 4 MMOL/L (ref -5–5)
BASOPHILS # BLD AUTO: 0.11 10*3/MM3 (ref 0–0.2)
BASOPHILS NFR BLD AUTO: 1 % (ref 0–1.5)
BILIRUB SERPL-MCNC: 0.4 MG/DL (ref 0–1.2)
BUN SERPL-MCNC: 14 MG/DL (ref 8–23)
BUN/CREAT SERPL: 13.1 (ref 7–25)
CA-I BLDA-SCNC: 1.29 MMOL/L (ref 1.2–1.32)
CALCIUM SPEC-SCNC: 9.9 MG/DL (ref 8.6–10.5)
CHLORIDE SERPL-SCNC: 103 MMOL/L (ref 98–107)
CO2 BLDA-SCNC: 31 MMOL/L (ref 24–29)
CO2 SERPL-SCNC: 26 MMOL/L (ref 22–29)
CREAT BLDA-MCNC: 1.1 MG/DL (ref 0.6–1.3)
CREAT SERPL-MCNC: 1.07 MG/DL (ref 0.57–1)
DEPRECATED RDW RBC AUTO: 39.8 FL (ref 37–54)
EOSINOPHIL # BLD AUTO: 0.22 10*3/MM3 (ref 0–0.4)
EOSINOPHIL NFR BLD AUTO: 2 % (ref 0.3–6.2)
ERYTHROCYTE [DISTWIDTH] IN BLOOD BY AUTOMATED COUNT: 14.3 % (ref 12.3–15.4)
GFR SERPL CREATININE-BSD FRML MDRD: 50 ML/MIN/1.73
GLOBULIN UR ELPH-MCNC: 2.9 GM/DL
GLUCOSE BLDC GLUCOMTR-MCNC: 112 MG/DL (ref 70–130)
GLUCOSE SERPL-MCNC: 118 MG/DL (ref 65–99)
HCO3 BLDA-SCNC: 29.2 MMOL/L (ref 22–26)
HCT VFR BLD AUTO: 44.3 % (ref 34–46.6)
HCT VFR BLDA CALC: 45 % (ref 38–51)
HGB BLD-MCNC: 13.4 G/DL (ref 12–15.9)
HGB BLDA-MCNC: 15.3 G/DL (ref 12–17)
HOLD SPECIMEN: NORMAL
IMM GRANULOCYTES # BLD AUTO: 0.03 10*3/MM3 (ref 0–0.05)
IMM GRANULOCYTES NFR BLD AUTO: 0.3 % (ref 0–0.5)
INR PPP: 1.2 (ref 0.8–1.2)
LYMPHOCYTES # BLD AUTO: 2.97 10*3/MM3 (ref 0.7–3.1)
LYMPHOCYTES NFR BLD AUTO: 26.8 % (ref 19.6–45.3)
MAGNESIUM SERPL-MCNC: 2 MG/DL (ref 1.6–2.4)
MCH RBC QN AUTO: 23.9 PG (ref 26.6–33)
MCHC RBC AUTO-ENTMCNC: 30.2 G/DL (ref 31.5–35.7)
MCV RBC AUTO: 79 FL (ref 79–97)
MONOCYTES # BLD AUTO: 1.09 10*3/MM3 (ref 0.1–0.9)
MONOCYTES NFR BLD AUTO: 9.8 % (ref 5–12)
NEUTROPHILS NFR BLD AUTO: 6.65 10*3/MM3 (ref 1.7–7)
NEUTROPHILS NFR BLD AUTO: 60.1 % (ref 42.7–76)
NRBC BLD AUTO-RTO: 0 /100 WBC (ref 0–0.2)
PCO2 BLDA: 46.7 MM HG (ref 35–45)
PH BLDA: 7.4 PH UNITS (ref 7.35–7.6)
PLATELET # BLD AUTO: 311 10*3/MM3 (ref 140–450)
PMV BLD AUTO: 10.3 FL (ref 6–12)
PO2 BLDA: 21 MMHG (ref 80–105)
POTASSIUM BLDA-SCNC: 3.8 MMOL/L (ref 3.5–4.9)
POTASSIUM SERPL-SCNC: 4.1 MMOL/L (ref 3.5–5.2)
PROT SERPL-MCNC: 6.9 G/DL (ref 6–8.5)
PROTHROMBIN TIME: 14.1 SECONDS (ref 12.8–15.2)
RBC # BLD AUTO: 5.61 10*6/MM3 (ref 3.77–5.28)
SAO2 % BLDA: 35 % (ref 95–98)
SODIUM BLD-SCNC: 141 MMOL/L (ref 138–146)
SODIUM SERPL-SCNC: 141 MMOL/L (ref 136–145)
T4 FREE SERPL-MCNC: 1.6 NG/DL (ref 0.93–1.7)
TROPONIN T SERPL-MCNC: 0.01 NG/ML (ref 0–0.03)
TSH SERPL DL<=0.05 MIU/L-ACNC: 3.4 UIU/ML (ref 0.27–4.2)
WBC # BLD AUTO: 11.07 10*3/MM3 (ref 3.4–10.8)
WHOLE BLOOD HOLD SPECIMEN: NORMAL

## 2021-09-09 PROCEDURE — 99284 EMERGENCY DEPT VISIT MOD MDM: CPT

## 2021-09-09 PROCEDURE — 82330 ASSAY OF CALCIUM: CPT

## 2021-09-09 PROCEDURE — 71045 X-RAY EXAM CHEST 1 VIEW: CPT

## 2021-09-09 PROCEDURE — 85610 PROTHROMBIN TIME: CPT

## 2021-09-09 PROCEDURE — 70498 CT ANGIOGRAPHY NECK: CPT

## 2021-09-09 PROCEDURE — 85014 HEMATOCRIT: CPT

## 2021-09-09 PROCEDURE — 85025 COMPLETE CBC W/AUTO DIFF WBC: CPT | Performed by: EMERGENCY MEDICINE

## 2021-09-09 PROCEDURE — 0 IOPAMIDOL PER 1 ML: Performed by: EMERGENCY MEDICINE

## 2021-09-09 PROCEDURE — 81001 URINALYSIS AUTO W/SCOPE: CPT | Performed by: INTERNAL MEDICINE

## 2021-09-09 PROCEDURE — 84132 ASSAY OF SERUM POTASSIUM: CPT

## 2021-09-09 PROCEDURE — 82947 ASSAY GLUCOSE BLOOD QUANT: CPT

## 2021-09-09 PROCEDURE — U0005 INFEC AGEN DETEC AMPLI PROBE: HCPCS | Performed by: EMERGENCY MEDICINE

## 2021-09-09 PROCEDURE — 84484 ASSAY OF TROPONIN QUANT: CPT | Performed by: EMERGENCY MEDICINE

## 2021-09-09 PROCEDURE — 84295 ASSAY OF SERUM SODIUM: CPT

## 2021-09-09 PROCEDURE — 0042T HC CT CEREBRAL PERFUSION W/WO CONTRAST: CPT

## 2021-09-09 PROCEDURE — 80053 COMPREHEN METABOLIC PANEL: CPT | Performed by: EMERGENCY MEDICINE

## 2021-09-09 PROCEDURE — 99222 1ST HOSP IP/OBS MODERATE 55: CPT | Performed by: NURSE PRACTITIONER

## 2021-09-09 PROCEDURE — 93005 ELECTROCARDIOGRAM TRACING: CPT | Performed by: EMERGENCY MEDICINE

## 2021-09-09 PROCEDURE — 82565 ASSAY OF CREATININE: CPT

## 2021-09-09 PROCEDURE — 83735 ASSAY OF MAGNESIUM: CPT | Performed by: EMERGENCY MEDICINE

## 2021-09-09 PROCEDURE — 99285 EMERGENCY DEPT VISIT HI MDM: CPT

## 2021-09-09 PROCEDURE — 70450 CT HEAD/BRAIN W/O DYE: CPT

## 2021-09-09 PROCEDURE — 70496 CT ANGIOGRAPHY HEAD: CPT

## 2021-09-09 PROCEDURE — 82803 BLOOD GASES ANY COMBINATION: CPT

## 2021-09-09 PROCEDURE — 84439 ASSAY OF FREE THYROXINE: CPT | Performed by: EMERGENCY MEDICINE

## 2021-09-09 PROCEDURE — U0003 INFECTIOUS AGENT DETECTION BY NUCLEIC ACID (DNA OR RNA); SEVERE ACUTE RESPIRATORY SYNDROME CORONAVIRUS 2 (SARS-COV-2) (CORONAVIRUS DISEASE [COVID-19]), AMPLIFIED PROBE TECHNIQUE, MAKING USE OF HIGH THROUGHPUT TECHNOLOGIES AS DESCRIBED BY CMS-2020-01-R: HCPCS | Performed by: EMERGENCY MEDICINE

## 2021-09-09 PROCEDURE — 84443 ASSAY THYROID STIM HORMONE: CPT | Performed by: EMERGENCY MEDICINE

## 2021-09-09 RX ORDER — ASPIRIN 300 MG/1
300 SUPPOSITORY RECTAL DAILY
Status: DISCONTINUED | OUTPATIENT
Start: 2021-09-10 | End: 2021-09-10

## 2021-09-09 RX ORDER — ASPIRIN 81 MG/1
81 TABLET, CHEWABLE ORAL DAILY
Status: DISCONTINUED | OUTPATIENT
Start: 2021-09-10 | End: 2021-09-09

## 2021-09-09 RX ORDER — SODIUM CHLORIDE 0.9 % (FLUSH) 0.9 %
10 SYRINGE (ML) INJECTION AS NEEDED
Status: DISCONTINUED | OUTPATIENT
Start: 2021-09-09 | End: 2021-09-13 | Stop reason: SDUPTHER

## 2021-09-09 RX ORDER — ASPIRIN 300 MG/1
300 SUPPOSITORY RECTAL ONCE
Status: COMPLETED | OUTPATIENT
Start: 2021-09-09 | End: 2021-09-09

## 2021-09-09 RX ORDER — ASPIRIN 81 MG/1
81 TABLET, CHEWABLE ORAL DAILY
Status: DISCONTINUED | OUTPATIENT
Start: 2021-09-10 | End: 2021-09-10

## 2021-09-09 RX ADMIN — IOPAMIDOL 100 ML: 755 INJECTION, SOLUTION INTRAVENOUS at 22:30

## 2021-09-09 RX ADMIN — ASPIRIN 300 MG: 300 SUPPOSITORY RECTAL at 23:44

## 2021-09-10 ENCOUNTER — APPOINTMENT (OUTPATIENT)
Dept: CARDIOLOGY | Facility: HOSPITAL | Age: 76
End: 2021-09-10

## 2021-09-10 ENCOUNTER — APPOINTMENT (OUTPATIENT)
Dept: MRI IMAGING | Facility: HOSPITAL | Age: 76
End: 2021-09-10

## 2021-09-10 PROBLEM — R80.9 PROTEINURIA: Status: ACTIVE | Noted: 2021-09-10

## 2021-09-10 LAB
ANION GAP SERPL CALCULATED.3IONS-SCNC: 13 MMOL/L (ref 5–15)
BACTERIA UR QL AUTO: NORMAL /HPF
BASOPHILS # BLD AUTO: 0.11 10*3/MM3 (ref 0–0.2)
BASOPHILS NFR BLD AUTO: 1 % (ref 0–1.5)
BH CV ECHO MEAS - AO MAX PG (FULL): 3.1 MMHG
BH CV ECHO MEAS - AO MAX PG: 9 MMHG
BH CV ECHO MEAS - AO MEAN PG (FULL): 2 MMHG
BH CV ECHO MEAS - AO MEAN PG: 5 MMHG
BH CV ECHO MEAS - AO ROOT AREA (BSA CORRECTED): 1.8
BH CV ECHO MEAS - AO ROOT AREA: 8 CM^2
BH CV ECHO MEAS - AO ROOT DIAM: 3.2 CM
BH CV ECHO MEAS - AO V2 MAX: 146 CM/SEC
BH CV ECHO MEAS - AO V2 MEAN: 103 CM/SEC
BH CV ECHO MEAS - AO V2 VTI: 23.5 CM
BH CV ECHO MEAS - AVA(I,A): 2.8 CM^2
BH CV ECHO MEAS - AVA(I,D): 2.8 CM^2
BH CV ECHO MEAS - AVA(V,A): 2.6 CM^2
BH CV ECHO MEAS - AVA(V,D): 2.6 CM^2
BH CV ECHO MEAS - BSA(HAYCOCK): 1.8 M^2
BH CV ECHO MEAS - BSA: 1.8 M^2
BH CV ECHO MEAS - BZI_BMI: 30.4 KILOGRAMS/M^2
BH CV ECHO MEAS - BZI_METRIC_HEIGHT: 157.5 CM
BH CV ECHO MEAS - BZI_METRIC_WEIGHT: 75.3 KG
BH CV ECHO MEAS - EDV(CUBED): 59.3 ML
BH CV ECHO MEAS - EDV(MOD-SP2): 75.2 ML
BH CV ECHO MEAS - EDV(MOD-SP4): 97.3 ML
BH CV ECHO MEAS - EDV(TEICH): 65.9 ML
BH CV ECHO MEAS - EF(CUBED): 70.4 %
BH CV ECHO MEAS - EF(MOD-SP2): 61.6 %
BH CV ECHO MEAS - EF(MOD-SP4): 59.2 %
BH CV ECHO MEAS - EF(TEICH): 62.7 %
BH CV ECHO MEAS - ESV(CUBED): 17.6 ML
BH CV ECHO MEAS - ESV(MOD-SP2): 28.9 ML
BH CV ECHO MEAS - ESV(MOD-SP4): 39.7 ML
BH CV ECHO MEAS - ESV(TEICH): 24.6 ML
BH CV ECHO MEAS - FS: 33.3 %
BH CV ECHO MEAS - IVS/LVPW: 1
BH CV ECHO MEAS - IVSD: 1.4 CM
BH CV ECHO MEAS - LA DIMENSION: 3.7 CM
BH CV ECHO MEAS - LA/AO: 1.2
BH CV ECHO MEAS - LAD MAJOR: 4.8 CM
BH CV ECHO MEAS - LAT PEAK E' VEL: 7.6 CM/SEC
BH CV ECHO MEAS - LATERAL E/E' RATIO: 6.2
BH CV ECHO MEAS - LV DIASTOLIC VOL/BSA (35-75): 55.1 ML/M^2
BH CV ECHO MEAS - LV IVRT: 0.09 SEC
BH CV ECHO MEAS - LV MASS(C)D: 201.5 GRAMS
BH CV ECHO MEAS - LV MASS(C)DI: 114.1 GRAMS/M^2
BH CV ECHO MEAS - LV MAX PG: 5.9 MMHG
BH CV ECHO MEAS - LV MEAN PG: 3 MMHG
BH CV ECHO MEAS - LV SYSTOLIC VOL/BSA (12-30): 22.5 ML/M^2
BH CV ECHO MEAS - LV V1 MAX: 121 CM/SEC
BH CV ECHO MEAS - LV V1 MEAN: 83 CM/SEC
BH CV ECHO MEAS - LV V1 VTI: 20.6 CM
BH CV ECHO MEAS - LVIDD: 3.9 CM
BH CV ECHO MEAS - LVIDS: 2.6 CM
BH CV ECHO MEAS - LVLD AP2: 8 CM
BH CV ECHO MEAS - LVLD AP4: 8.1 CM
BH CV ECHO MEAS - LVLS AP2: 7 CM
BH CV ECHO MEAS - LVLS AP4: 7.1 CM
BH CV ECHO MEAS - LVOT AREA (M): 3.1 CM^2
BH CV ECHO MEAS - LVOT AREA: 3.1 CM^2
BH CV ECHO MEAS - LVOT DIAM: 2 CM
BH CV ECHO MEAS - LVPWD: 1.4 CM
BH CV ECHO MEAS - MED PEAK E' VEL: 4.7 CM/SEC
BH CV ECHO MEAS - MEDIAL E/E' RATIO: 10
BH CV ECHO MEAS - MV A MAX VEL: 73.3 CM/SEC
BH CV ECHO MEAS - MV DEC SLOPE: 242 CM/SEC^2
BH CV ECHO MEAS - MV DEC TIME: 0.3 SEC
BH CV ECHO MEAS - MV E MAX VEL: 47.1 CM/SEC
BH CV ECHO MEAS - MV E/A: 0.64
BH CV ECHO MEAS - MV MAX PG: 2.7 MMHG
BH CV ECHO MEAS - MV MEAN PG: 1 MMHG
BH CV ECHO MEAS - MV P1/2T MAX VEL: 62.1 CM/SEC
BH CV ECHO MEAS - MV P1/2T: 75.2 MSEC
BH CV ECHO MEAS - MV V2 MAX: 81.9 CM/SEC
BH CV ECHO MEAS - MV V2 MEAN: 45.1 CM/SEC
BH CV ECHO MEAS - MV V2 VTI: 25.2 CM
BH CV ECHO MEAS - MVA P1/2T LCG: 3.5 CM^2
BH CV ECHO MEAS - MVA(P1/2T): 2.9 CM^2
BH CV ECHO MEAS - MVA(VTI): 2.6 CM^2
BH CV ECHO MEAS - PA ACC TIME: 0.16 SEC
BH CV ECHO MEAS - PA MAX PG: 4.9 MMHG
BH CV ECHO MEAS - PA PR(ACCEL): 9.3 MMHG
BH CV ECHO MEAS - PA V2 MAX: 111 CM/SEC
BH CV ECHO MEAS - SI(AO): 107 ML/M^2
BH CV ECHO MEAS - SI(CUBED): 23.6 ML/M^2
BH CV ECHO MEAS - SI(LVOT): 36.6 ML/M^2
BH CV ECHO MEAS - SI(MOD-SP2): 26.2 ML/M^2
BH CV ECHO MEAS - SI(MOD-SP4): 32.6 ML/M^2
BH CV ECHO MEAS - SI(TEICH): 23.4 ML/M^2
BH CV ECHO MEAS - SV(AO): 189 ML
BH CV ECHO MEAS - SV(CUBED): 41.7 ML
BH CV ECHO MEAS - SV(LVOT): 64.7 ML
BH CV ECHO MEAS - SV(MOD-SP2): 46.3 ML
BH CV ECHO MEAS - SV(MOD-SP4): 57.6 ML
BH CV ECHO MEAS - SV(TEICH): 41.3 ML
BH CV ECHO MEAS - TAPSE (>1.6): 2 CM
BH CV ECHO MEASUREMENTS AVERAGE E/E' RATIO: 7.66
BH CV VAS BP LEFT ARM: NORMAL MMHG
BH CV XLRA - RV BASE: 3 CM
BH CV XLRA - RV LENGTH: 6.3 CM
BH CV XLRA - RV MID: 2 CM
BH CV XLRA - TDI S': 17.7 CM/SEC
BILIRUB UR QL STRIP: NEGATIVE
BUN SERPL-MCNC: 12 MG/DL (ref 8–23)
BUN/CREAT SERPL: 11.9 (ref 7–25)
CALCIUM SPEC-SCNC: 10 MG/DL (ref 8.6–10.5)
CHLORIDE SERPL-SCNC: 100 MMOL/L (ref 98–107)
CHOLEST SERPL-MCNC: 121 MG/DL (ref 0–200)
CLARITY UR: CLEAR
CO2 SERPL-SCNC: 24 MMOL/L (ref 22–29)
COLOR UR: YELLOW
CREAT SERPL-MCNC: 1.01 MG/DL (ref 0.57–1)
DEPRECATED RDW RBC AUTO: 39 FL (ref 37–54)
EOSINOPHIL # BLD AUTO: 0.21 10*3/MM3 (ref 0–0.4)
EOSINOPHIL NFR BLD AUTO: 1.9 % (ref 0.3–6.2)
ERYTHROCYTE [DISTWIDTH] IN BLOOD BY AUTOMATED COUNT: 14.3 % (ref 12.3–15.4)
GFR SERPL CREATININE-BSD FRML MDRD: 53 ML/MIN/1.73
GLUCOSE BLDC GLUCOMTR-MCNC: 118 MG/DL (ref 70–130)
GLUCOSE BLDC GLUCOMTR-MCNC: 134 MG/DL (ref 70–130)
GLUCOSE BLDC GLUCOMTR-MCNC: 134 MG/DL (ref 70–130)
GLUCOSE BLDC GLUCOMTR-MCNC: 137 MG/DL (ref 70–130)
GLUCOSE BLDC GLUCOMTR-MCNC: 141 MG/DL (ref 70–130)
GLUCOSE SERPL-MCNC: 145 MG/DL (ref 65–99)
GLUCOSE UR STRIP-MCNC: NEGATIVE MG/DL
HBA1C MFR BLD: 6.8 % (ref 4.8–5.6)
HCT VFR BLD AUTO: 46.7 % (ref 34–46.6)
HDLC SERPL-MCNC: 36 MG/DL (ref 40–60)
HGB BLD-MCNC: 14.3 G/DL (ref 12–15.9)
HGB UR QL STRIP.AUTO: NEGATIVE
HYALINE CASTS UR QL AUTO: NORMAL /LPF
IMM GRANULOCYTES # BLD AUTO: 0.05 10*3/MM3 (ref 0–0.05)
IMM GRANULOCYTES NFR BLD AUTO: 0.4 % (ref 0–0.5)
KETONES UR QL STRIP: NEGATIVE
LDLC SERPL CALC-MCNC: 53 MG/DL (ref 0–100)
LDLC/HDLC SERPL: 1.26 {RATIO}
LEFT ATRIUM VOLUME INDEX: 23.7 ML/M^2
LEFT ATRIUM VOLUME: 61.8 ML
LEUKOCYTE ESTERASE UR QL STRIP.AUTO: NEGATIVE
LYMPHOCYTES # BLD AUTO: 2.45 10*3/MM3 (ref 0.7–3.1)
LYMPHOCYTES NFR BLD AUTO: 21.9 % (ref 19.6–45.3)
MCH RBC QN AUTO: 23.9 PG (ref 26.6–33)
MCHC RBC AUTO-ENTMCNC: 30.6 G/DL (ref 31.5–35.7)
MCV RBC AUTO: 78 FL (ref 79–97)
MONOCYTES # BLD AUTO: 0.86 10*3/MM3 (ref 0.1–0.9)
MONOCYTES NFR BLD AUTO: 7.7 % (ref 5–12)
NEUTROPHILS NFR BLD AUTO: 67.1 % (ref 42.7–76)
NEUTROPHILS NFR BLD AUTO: 7.5 10*3/MM3 (ref 1.7–7)
NITRITE UR QL STRIP: NEGATIVE
NRBC BLD AUTO-RTO: 0 /100 WBC (ref 0–0.2)
PH UR STRIP.AUTO: 6 [PH] (ref 5–8)
PLATELET # BLD AUTO: 344 10*3/MM3 (ref 140–450)
PMV BLD AUTO: 10.6 FL (ref 6–12)
POTASSIUM SERPL-SCNC: 4 MMOL/L (ref 3.5–5.2)
PROT UR QL STRIP: ABNORMAL
RBC # BLD AUTO: 5.99 10*6/MM3 (ref 3.77–5.28)
RBC # UR: NORMAL /HPF
REF LAB TEST METHOD: NORMAL
SARS-COV-2 RNA RESP QL NAA+PROBE: NOT DETECTED
SODIUM SERPL-SCNC: 137 MMOL/L (ref 136–145)
SP GR UR STRIP: 1.04 (ref 1–1.03)
SQUAMOUS #/AREA URNS HPF: NORMAL /HPF
TRIGL SERPL-MCNC: 198 MG/DL (ref 0–150)
UROBILINOGEN UR QL STRIP: ABNORMAL
VLDLC SERPL-MCNC: 32 MG/DL (ref 5–40)
WBC # BLD AUTO: 11.18 10*3/MM3 (ref 3.4–10.8)
WBC UR QL AUTO: NORMAL /HPF

## 2021-09-10 PROCEDURE — 63710000001 INSULIN DETEMIR PER 5 UNITS: Performed by: INTERNAL MEDICINE

## 2021-09-10 PROCEDURE — 85025 COMPLETE CBC W/AUTO DIFF WBC: CPT | Performed by: NURSE PRACTITIONER

## 2021-09-10 PROCEDURE — 80048 BASIC METABOLIC PNL TOTAL CA: CPT | Performed by: NURSE PRACTITIONER

## 2021-09-10 PROCEDURE — G0378 HOSPITAL OBSERVATION PER HR: HCPCS

## 2021-09-10 PROCEDURE — 97161 PT EVAL LOW COMPLEX 20 MIN: CPT

## 2021-09-10 PROCEDURE — 99222 1ST HOSP IP/OBS MODERATE 55: CPT | Performed by: INTERNAL MEDICINE

## 2021-09-10 PROCEDURE — 92523 SPEECH SOUND LANG COMPREHEN: CPT

## 2021-09-10 PROCEDURE — 4A03X5D MEASUREMENT OF ARTERIAL FLOW, INTRACRANIAL, EXTERNAL APPROACH: ICD-10-PCS | Performed by: RADIOLOGY

## 2021-09-10 PROCEDURE — 82962 GLUCOSE BLOOD TEST: CPT

## 2021-09-10 PROCEDURE — 83036 HEMOGLOBIN GLYCOSYLATED A1C: CPT | Performed by: NURSE PRACTITIONER

## 2021-09-10 PROCEDURE — 97166 OT EVAL MOD COMPLEX 45 MIN: CPT

## 2021-09-10 PROCEDURE — 93306 TTE W/DOPPLER COMPLETE: CPT

## 2021-09-10 PROCEDURE — 92610 EVALUATE SWALLOWING FUNCTION: CPT

## 2021-09-10 PROCEDURE — 80061 LIPID PANEL: CPT | Performed by: NURSE PRACTITIONER

## 2021-09-10 PROCEDURE — 70551 MRI BRAIN STEM W/O DYE: CPT

## 2021-09-10 PROCEDURE — 93306 TTE W/DOPPLER COMPLETE: CPT | Performed by: INTERNAL MEDICINE

## 2021-09-10 PROCEDURE — 97530 THERAPEUTIC ACTIVITIES: CPT

## 2021-09-10 RX ORDER — GABAPENTIN 300 MG/1
300 CAPSULE ORAL NIGHTLY
Status: DISCONTINUED | OUTPATIENT
Start: 2021-09-10 | End: 2021-09-14 | Stop reason: HOSPADM

## 2021-09-10 RX ORDER — ACETAMINOPHEN 325 MG/1
650 TABLET ORAL EVERY 4 HOURS PRN
Status: DISCONTINUED | OUTPATIENT
Start: 2021-09-10 | End: 2021-09-10 | Stop reason: SDUPTHER

## 2021-09-10 RX ORDER — ATORVASTATIN CALCIUM 40 MG/1
80 TABLET, FILM COATED ORAL NIGHTLY
Status: DISCONTINUED | OUTPATIENT
Start: 2021-09-10 | End: 2021-09-10 | Stop reason: SDUPTHER

## 2021-09-10 RX ORDER — ATORVASTATIN CALCIUM 40 MG/1
80 TABLET, FILM COATED ORAL NIGHTLY
Status: DISCONTINUED | OUTPATIENT
Start: 2021-09-10 | End: 2021-09-11

## 2021-09-10 RX ORDER — ASPIRIN 81 MG/1
81 TABLET, CHEWABLE ORAL DAILY
Status: DISCONTINUED | OUTPATIENT
Start: 2021-09-10 | End: 2021-09-10 | Stop reason: SDUPTHER

## 2021-09-10 RX ORDER — PANTOPRAZOLE SODIUM 40 MG/1
40 TABLET, DELAYED RELEASE ORAL EVERY MORNING
Status: DISCONTINUED | OUTPATIENT
Start: 2021-09-10 | End: 2021-09-14 | Stop reason: HOSPADM

## 2021-09-10 RX ORDER — ACETAMINOPHEN 650 MG/1
650 SUPPOSITORY RECTAL EVERY 4 HOURS PRN
Status: DISCONTINUED | OUTPATIENT
Start: 2021-09-10 | End: 2021-09-10 | Stop reason: SDUPTHER

## 2021-09-10 RX ORDER — SODIUM CHLORIDE 0.9 % (FLUSH) 0.9 %
10 SYRINGE (ML) INJECTION EVERY 12 HOURS SCHEDULED
Status: DISCONTINUED | OUTPATIENT
Start: 2021-09-10 | End: 2021-09-14 | Stop reason: HOSPADM

## 2021-09-10 RX ORDER — LISINOPRIL 10 MG/1
10 TABLET ORAL
Status: DISCONTINUED | OUTPATIENT
Start: 2021-09-10 | End: 2021-09-11

## 2021-09-10 RX ORDER — DOCUSATE SODIUM 100 MG/1
100 CAPSULE, LIQUID FILLED ORAL 3 TIMES DAILY
Status: DISCONTINUED | OUTPATIENT
Start: 2021-09-10 | End: 2021-09-14 | Stop reason: HOSPADM

## 2021-09-10 RX ORDER — OXYCODONE HYDROCHLORIDE 5 MG/1
10 TABLET ORAL 3 TIMES DAILY
Status: DISCONTINUED | OUTPATIENT
Start: 2021-09-10 | End: 2021-09-14 | Stop reason: HOSPADM

## 2021-09-10 RX ORDER — ACETAMINOPHEN 650 MG/1
650 SUPPOSITORY RECTAL EVERY 4 HOURS PRN
Status: DISCONTINUED | OUTPATIENT
Start: 2021-09-10 | End: 2021-09-14 | Stop reason: HOSPADM

## 2021-09-10 RX ORDER — SODIUM CHLORIDE, SODIUM LACTATE, POTASSIUM CHLORIDE, CALCIUM CHLORIDE 600; 310; 30; 20 MG/100ML; MG/100ML; MG/100ML; MG/100ML
50 INJECTION, SOLUTION INTRAVENOUS CONTINUOUS
Status: ACTIVE | OUTPATIENT
Start: 2021-09-10 | End: 2021-09-10

## 2021-09-10 RX ORDER — ACETAMINOPHEN 160 MG/5ML
650 SOLUTION ORAL EVERY 4 HOURS PRN
Status: DISCONTINUED | OUTPATIENT
Start: 2021-09-10 | End: 2021-09-14 | Stop reason: HOSPADM

## 2021-09-10 RX ORDER — DEXTROSE MONOHYDRATE 25 G/50ML
25 INJECTION, SOLUTION INTRAVENOUS
Status: DISCONTINUED | OUTPATIENT
Start: 2021-09-10 | End: 2021-09-14 | Stop reason: HOSPADM

## 2021-09-10 RX ORDER — ACETAMINOPHEN 325 MG/1
650 TABLET ORAL EVERY 4 HOURS PRN
Status: DISCONTINUED | OUTPATIENT
Start: 2021-09-10 | End: 2021-09-14 | Stop reason: HOSPADM

## 2021-09-10 RX ORDER — SODIUM CHLORIDE 0.9 % (FLUSH) 0.9 %
10 SYRINGE (ML) INJECTION AS NEEDED
Status: DISCONTINUED | OUTPATIENT
Start: 2021-09-10 | End: 2021-09-14 | Stop reason: HOSPADM

## 2021-09-10 RX ORDER — UBIDECARENONE 75 MG
100 CAPSULE ORAL DAILY
Status: DISCONTINUED | OUTPATIENT
Start: 2021-09-10 | End: 2021-09-14 | Stop reason: HOSPADM

## 2021-09-10 RX ORDER — DIPHENOXYLATE HYDROCHLORIDE AND ATROPINE SULFATE 2.5; .025 MG/1; MG/1
1 TABLET ORAL DAILY
Status: DISCONTINUED | OUTPATIENT
Start: 2021-09-10 | End: 2021-09-14 | Stop reason: HOSPADM

## 2021-09-10 RX ORDER — ATENOLOL 25 MG/1
25 TABLET ORAL
Status: DISCONTINUED | OUTPATIENT
Start: 2021-09-10 | End: 2021-09-11

## 2021-09-10 RX ORDER — ASPIRIN 81 MG/1
325 TABLET, CHEWABLE ORAL DAILY
Status: DISCONTINUED | OUTPATIENT
Start: 2021-09-10 | End: 2021-09-11

## 2021-09-10 RX ORDER — NICOTINE POLACRILEX 4 MG
15 LOZENGE BUCCAL
Status: DISCONTINUED | OUTPATIENT
Start: 2021-09-10 | End: 2021-09-14 | Stop reason: HOSPADM

## 2021-09-10 RX ORDER — ASPIRIN 300 MG/1
300 SUPPOSITORY RECTAL DAILY
Status: DISCONTINUED | OUTPATIENT
Start: 2021-09-10 | End: 2021-09-11

## 2021-09-10 RX ORDER — HYDROCHLOROTHIAZIDE 25 MG/1
6.25 TABLET ORAL DAILY
Status: DISCONTINUED | OUTPATIENT
Start: 2021-09-10 | End: 2021-09-11

## 2021-09-10 RX ADMIN — DOCUSATE SODIUM 100 MG: 100 CAPSULE, LIQUID FILLED ORAL at 11:20

## 2021-09-10 RX ADMIN — ATORVASTATIN CALCIUM 80 MG: 40 TABLET, FILM COATED ORAL at 20:04

## 2021-09-10 RX ADMIN — SODIUM CHLORIDE, PRESERVATIVE FREE 10 ML: 5 INJECTION INTRAVENOUS at 02:53

## 2021-09-10 RX ADMIN — SODIUM CHLORIDE, POTASSIUM CHLORIDE, SODIUM LACTATE AND CALCIUM CHLORIDE 50 ML/HR: 600; 310; 30; 20 INJECTION, SOLUTION INTRAVENOUS at 02:54

## 2021-09-10 RX ADMIN — ASPIRIN 325 MG: 81 TABLET, CHEWABLE ORAL at 11:27

## 2021-09-10 RX ADMIN — INSULIN DETEMIR 20 UNITS: 100 INJECTION, SOLUTION SUBCUTANEOUS at 12:54

## 2021-09-10 RX ADMIN — Medication 100 MCG: at 11:20

## 2021-09-10 RX ADMIN — OXYCODONE 10 MG: 5 TABLET ORAL at 20:04

## 2021-09-10 RX ADMIN — APIXABAN 5 MG: 5 TABLET, FILM COATED ORAL at 20:04

## 2021-09-10 RX ADMIN — HYDROCHLOROTHIAZIDE 6.25 MG: 25 TABLET ORAL at 18:27

## 2021-09-10 RX ADMIN — SODIUM CHLORIDE, PRESERVATIVE FREE 10 ML: 5 INJECTION INTRAVENOUS at 20:05

## 2021-09-10 RX ADMIN — GABAPENTIN 300 MG: 300 CAPSULE ORAL at 20:04

## 2021-09-10 RX ADMIN — ATENOLOL 25 MG: 25 TABLET ORAL at 18:27

## 2021-09-10 RX ADMIN — LISINOPRIL 10 MG: 10 TABLET ORAL at 18:27

## 2021-09-10 RX ADMIN — GABAPENTIN 300 MG: 300 CAPSULE ORAL at 02:53

## 2021-09-10 RX ADMIN — ATORVASTATIN CALCIUM 80 MG: 40 TABLET, FILM COATED ORAL at 02:53

## 2021-09-10 RX ADMIN — MULTIVITAMIN TABLET 1 TABLET: TABLET at 11:20

## 2021-09-11 ENCOUNTER — APPOINTMENT (OUTPATIENT)
Dept: NEUROLOGY | Facility: HOSPITAL | Age: 76
End: 2021-09-11

## 2021-09-11 PROBLEM — E04.1 THYROID NODULE: Status: ACTIVE | Noted: 2021-09-11

## 2021-09-11 LAB
ANION GAP SERPL CALCULATED.3IONS-SCNC: 11 MMOL/L (ref 5–15)
BUN SERPL-MCNC: 15 MG/DL (ref 8–23)
BUN/CREAT SERPL: 13.2 (ref 7–25)
CALCIUM SPEC-SCNC: 9.4 MG/DL (ref 8.6–10.5)
CHLORIDE SERPL-SCNC: 104 MMOL/L (ref 98–107)
CO2 SERPL-SCNC: 25 MMOL/L (ref 22–29)
CREAT SERPL-MCNC: 1.14 MG/DL (ref 0.57–1)
DEPRECATED RDW RBC AUTO: 41.5 FL (ref 37–54)
ERYTHROCYTE [DISTWIDTH] IN BLOOD BY AUTOMATED COUNT: 14.6 % (ref 12.3–15.4)
GFR SERPL CREATININE-BSD FRML MDRD: 46 ML/MIN/1.73
GLUCOSE BLDC GLUCOMTR-MCNC: 121 MG/DL (ref 70–130)
GLUCOSE BLDC GLUCOMTR-MCNC: 124 MG/DL (ref 70–130)
GLUCOSE BLDC GLUCOMTR-MCNC: 197 MG/DL (ref 70–130)
GLUCOSE BLDC GLUCOMTR-MCNC: 216 MG/DL (ref 70–130)
GLUCOSE SERPL-MCNC: 124 MG/DL (ref 65–99)
HCT VFR BLD AUTO: 43.6 % (ref 34–46.6)
HGB BLD-MCNC: 12.9 G/DL (ref 12–15.9)
MCH RBC QN AUTO: 23.8 PG (ref 26.6–33)
MCHC RBC AUTO-ENTMCNC: 29.6 G/DL (ref 31.5–35.7)
MCV RBC AUTO: 80.4 FL (ref 79–97)
PLATELET # BLD AUTO: 293 10*3/MM3 (ref 140–450)
PMV BLD AUTO: 10.6 FL (ref 6–12)
POTASSIUM SERPL-SCNC: 3.7 MMOL/L (ref 3.5–5.2)
RBC # BLD AUTO: 5.42 10*6/MM3 (ref 3.77–5.28)
SODIUM SERPL-SCNC: 140 MMOL/L (ref 136–145)
T4 FREE SERPL-MCNC: 1.55 NG/DL (ref 0.93–1.7)
WBC # BLD AUTO: 11.03 10*3/MM3 (ref 3.4–10.8)

## 2021-09-11 PROCEDURE — 63710000001 INSULIN LISPRO (HUMAN) PER 5 UNITS: Performed by: NURSE PRACTITIONER

## 2021-09-11 PROCEDURE — 85027 COMPLETE CBC AUTOMATED: CPT | Performed by: FAMILY MEDICINE

## 2021-09-11 PROCEDURE — G0378 HOSPITAL OBSERVATION PER HR: HCPCS

## 2021-09-11 PROCEDURE — 99233 SBSQ HOSP IP/OBS HIGH 50: CPT | Performed by: FAMILY MEDICINE

## 2021-09-11 PROCEDURE — 82962 GLUCOSE BLOOD TEST: CPT

## 2021-09-11 PROCEDURE — 95819 EEG AWAKE AND ASLEEP: CPT

## 2021-09-11 PROCEDURE — 80048 BASIC METABOLIC PNL TOTAL CA: CPT | Performed by: FAMILY MEDICINE

## 2021-09-11 PROCEDURE — 63710000001 INSULIN DETEMIR PER 5 UNITS: Performed by: INTERNAL MEDICINE

## 2021-09-11 PROCEDURE — 63710000001 INSULIN LISPRO (HUMAN) PER 5 UNITS: Performed by: FAMILY MEDICINE

## 2021-09-11 PROCEDURE — 84439 ASSAY OF FREE THYROXINE: CPT | Performed by: FAMILY MEDICINE

## 2021-09-11 PROCEDURE — 99232 SBSQ HOSP IP/OBS MODERATE 35: CPT | Performed by: PSYCHIATRY & NEUROLOGY

## 2021-09-11 RX ORDER — LISINOPRIL 20 MG/1
20 TABLET ORAL DAILY
COMMUNITY
End: 2021-09-14 | Stop reason: HOSPADM

## 2021-09-11 RX ORDER — POLYETHYLENE GLYCOL 3350 17 G/17G
17 POWDER, FOR SOLUTION ORAL DAILY PRN
COMMUNITY

## 2021-09-11 RX ORDER — PANTOPRAZOLE SODIUM 40 MG/1
40 TABLET, DELAYED RELEASE ORAL DAILY
COMMUNITY

## 2021-09-11 RX ORDER — ASPIRIN 81 MG/1
81 TABLET ORAL DAILY
Status: DISCONTINUED | OUTPATIENT
Start: 2021-09-12 | End: 2021-09-14 | Stop reason: HOSPADM

## 2021-09-11 RX ORDER — METOCLOPRAMIDE 10 MG/1
10 TABLET ORAL 3 TIMES DAILY PRN
COMMUNITY
End: 2021-09-14 | Stop reason: HOSPADM

## 2021-09-11 RX ORDER — DULOXETIN HYDROCHLORIDE 60 MG/1
60 CAPSULE, DELAYED RELEASE ORAL DAILY
Status: DISCONTINUED | OUTPATIENT
Start: 2021-09-11 | End: 2021-09-14 | Stop reason: HOSPADM

## 2021-09-11 RX ORDER — ATORVASTATIN CALCIUM 40 MG/1
40 TABLET, FILM COATED ORAL NIGHTLY
Status: DISCONTINUED | OUTPATIENT
Start: 2021-09-11 | End: 2021-09-14 | Stop reason: HOSPADM

## 2021-09-11 RX ORDER — ATENOLOL 50 MG/1
50 TABLET ORAL
Status: DISCONTINUED | OUTPATIENT
Start: 2021-09-11 | End: 2021-09-14 | Stop reason: HOSPADM

## 2021-09-11 RX ORDER — TRAZODONE HYDROCHLORIDE 50 MG/1
50 TABLET ORAL NIGHTLY
COMMUNITY
End: 2021-09-14 | Stop reason: HOSPADM

## 2021-09-11 RX ORDER — DULOXETIN HYDROCHLORIDE 30 MG/1
30 CAPSULE, DELAYED RELEASE ORAL DAILY
COMMUNITY
End: 2021-09-14 | Stop reason: HOSPADM

## 2021-09-11 RX ORDER — PREGABALIN 150 MG/1
150 CAPSULE ORAL DAILY
Status: DISCONTINUED | OUTPATIENT
Start: 2021-09-11 | End: 2021-09-14 | Stop reason: HOSPADM

## 2021-09-11 RX ORDER — SOLIFENACIN SUCCINATE 5 MG/1
5 TABLET, FILM COATED ORAL DAILY
COMMUNITY

## 2021-09-11 RX ORDER — LISINOPRIL 40 MG/1
40 TABLET ORAL
Status: DISCONTINUED | OUTPATIENT
Start: 2021-09-11 | End: 2021-09-14 | Stop reason: HOSPADM

## 2021-09-11 RX ORDER — DICYCLOMINE HCL 20 MG
20 TABLET ORAL 3 TIMES DAILY PRN
COMMUNITY
End: 2021-09-14 | Stop reason: HOSPADM

## 2021-09-11 RX ORDER — METHENAMINE HIPPURATE 1000 MG/1
1 TABLET ORAL 2 TIMES DAILY WITH MEALS
COMMUNITY
End: 2021-09-14 | Stop reason: HOSPADM

## 2021-09-11 RX ORDER — HYDROCODONE BITARTRATE AND ACETAMINOPHEN 10; 325 MG/1; MG/1
1 TABLET ORAL 4 TIMES DAILY
COMMUNITY
End: 2021-09-14 | Stop reason: HOSPADM

## 2021-09-11 RX ORDER — HYDRALAZINE HYDROCHLORIDE 25 MG/1
25 TABLET, FILM COATED ORAL 3 TIMES DAILY PRN
COMMUNITY
End: 2021-09-14 | Stop reason: HOSPADM

## 2021-09-11 RX ORDER — PREGABALIN 150 MG/1
150 CAPSULE ORAL 2 TIMES DAILY
COMMUNITY
End: 2021-09-14 | Stop reason: HOSPADM

## 2021-09-11 RX ORDER — ROPINIROLE 1 MG/1
1 TABLET, FILM COATED ORAL NIGHTLY
COMMUNITY

## 2021-09-11 RX ORDER — HYDRALAZINE HYDROCHLORIDE 25 MG/1
25 TABLET, FILM COATED ORAL EVERY 8 HOURS SCHEDULED
Status: DISCONTINUED | OUTPATIENT
Start: 2021-09-11 | End: 2021-09-14 | Stop reason: HOSPADM

## 2021-09-11 RX ORDER — HYDROCHLOROTHIAZIDE 12.5 MG/1
12.5 TABLET ORAL DAILY
Status: DISCONTINUED | OUTPATIENT
Start: 2021-09-11 | End: 2021-09-14 | Stop reason: HOSPADM

## 2021-09-11 RX ADMIN — SODIUM CHLORIDE, PRESERVATIVE FREE 10 ML: 5 INJECTION INTRAVENOUS at 20:05

## 2021-09-11 RX ADMIN — ASPIRIN 325 MG: 81 TABLET, CHEWABLE ORAL at 10:33

## 2021-09-11 RX ADMIN — HYDRALAZINE HYDROCHLORIDE 25 MG: 25 TABLET, FILM COATED ORAL at 15:08

## 2021-09-11 RX ADMIN — OXYCODONE 10 MG: 5 TABLET ORAL at 15:08

## 2021-09-11 RX ADMIN — Medication 100 MCG: at 10:33

## 2021-09-11 RX ADMIN — ACETAMINOPHEN 650 MG: 325 TABLET, FILM COATED ORAL at 16:22

## 2021-09-11 RX ADMIN — GABAPENTIN 300 MG: 300 CAPSULE ORAL at 20:05

## 2021-09-11 RX ADMIN — ATORVASTATIN CALCIUM 40 MG: 40 TABLET, FILM COATED ORAL at 20:05

## 2021-09-11 RX ADMIN — ACETAMINOPHEN 650 MG: 325 TABLET, FILM COATED ORAL at 23:01

## 2021-09-11 RX ADMIN — ATENOLOL 50 MG: 50 TABLET ORAL at 10:33

## 2021-09-11 RX ADMIN — LISINOPRIL 40 MG: 40 TABLET ORAL at 10:33

## 2021-09-11 RX ADMIN — APIXABAN 5 MG: 5 TABLET, FILM COATED ORAL at 10:33

## 2021-09-11 RX ADMIN — DULOXETINE 60 MG: 60 CAPSULE, DELAYED RELEASE ORAL at 12:50

## 2021-09-11 RX ADMIN — OXYCODONE 10 MG: 5 TABLET ORAL at 20:05

## 2021-09-11 RX ADMIN — INSULIN DETEMIR 20 UNITS: 100 INJECTION, SOLUTION SUBCUTANEOUS at 10:32

## 2021-09-11 RX ADMIN — INSULIN LISPRO 5 UNITS: 100 INJECTION, SOLUTION INTRAVENOUS; SUBCUTANEOUS at 17:24

## 2021-09-11 RX ADMIN — DOCUSATE SODIUM 100 MG: 100 CAPSULE, LIQUID FILLED ORAL at 20:05

## 2021-09-11 RX ADMIN — HYDRALAZINE HYDROCHLORIDE 25 MG: 25 TABLET, FILM COATED ORAL at 20:05

## 2021-09-11 RX ADMIN — PREGABALIN 150 MG: 150 CAPSULE ORAL at 12:50

## 2021-09-11 RX ADMIN — SODIUM CHLORIDE, PRESERVATIVE FREE 10 ML: 5 INJECTION INTRAVENOUS at 12:51

## 2021-09-11 RX ADMIN — APIXABAN 5 MG: 5 TABLET, FILM COATED ORAL at 20:05

## 2021-09-11 RX ADMIN — MULTIVITAMIN TABLET 1 TABLET: TABLET at 10:33

## 2021-09-11 RX ADMIN — DOCUSATE SODIUM 100 MG: 100 CAPSULE, LIQUID FILLED ORAL at 16:22

## 2021-09-11 RX ADMIN — HYDROCHLOROTHIAZIDE 12.5 MG: 12.5 CAPSULE ORAL at 10:33

## 2021-09-11 RX ADMIN — PANTOPRAZOLE SODIUM 40 MG: 40 TABLET, DELAYED RELEASE ORAL at 06:12

## 2021-09-11 RX ADMIN — INSULIN LISPRO 3 UNITS: 100 INJECTION, SOLUTION INTRAVENOUS; SUBCUTANEOUS at 12:50

## 2021-09-12 ENCOUNTER — APPOINTMENT (OUTPATIENT)
Dept: GENERAL RADIOLOGY | Facility: HOSPITAL | Age: 76
End: 2021-09-12

## 2021-09-12 LAB
GLUCOSE BLDC GLUCOMTR-MCNC: 126 MG/DL (ref 70–130)
GLUCOSE BLDC GLUCOMTR-MCNC: 128 MG/DL (ref 70–130)
GLUCOSE BLDC GLUCOMTR-MCNC: 174 MG/DL (ref 70–130)
GLUCOSE BLDC GLUCOMTR-MCNC: 82 MG/DL (ref 70–130)

## 2021-09-12 PROCEDURE — 63710000001 INSULIN LISPRO (HUMAN) PER 5 UNITS: Performed by: FAMILY MEDICINE

## 2021-09-12 PROCEDURE — 73630 X-RAY EXAM OF FOOT: CPT

## 2021-09-12 PROCEDURE — 25010000002 HYDRALAZINE PER 20 MG: Performed by: FAMILY MEDICINE

## 2021-09-12 PROCEDURE — 82962 GLUCOSE BLOOD TEST: CPT

## 2021-09-12 PROCEDURE — 63710000001 INSULIN LISPRO (HUMAN) PER 5 UNITS: Performed by: NURSE PRACTITIONER

## 2021-09-12 PROCEDURE — 99232 SBSQ HOSP IP/OBS MODERATE 35: CPT | Performed by: FAMILY MEDICINE

## 2021-09-12 PROCEDURE — 63710000001 INSULIN DETEMIR PER 5 UNITS: Performed by: INTERNAL MEDICINE

## 2021-09-12 PROCEDURE — 25010000002 MORPHINE PER 10 MG: Performed by: INTERNAL MEDICINE

## 2021-09-12 PROCEDURE — 73030 X-RAY EXAM OF SHOULDER: CPT

## 2021-09-12 PROCEDURE — 99232 SBSQ HOSP IP/OBS MODERATE 35: CPT | Performed by: PSYCHIATRY & NEUROLOGY

## 2021-09-12 RX ORDER — MORPHINE SULFATE 2 MG/ML
2 INJECTION, SOLUTION INTRAMUSCULAR; INTRAVENOUS ONCE
Status: COMPLETED | OUTPATIENT
Start: 2021-09-12 | End: 2021-09-12

## 2021-09-12 RX ORDER — HYDRALAZINE HYDROCHLORIDE 20 MG/ML
10 INJECTION INTRAMUSCULAR; INTRAVENOUS ONCE
Status: COMPLETED | OUTPATIENT
Start: 2021-09-12 | End: 2021-09-12

## 2021-09-12 RX ADMIN — DULOXETINE 60 MG: 60 CAPSULE, DELAYED RELEASE ORAL at 08:55

## 2021-09-12 RX ADMIN — OXYCODONE 10 MG: 5 TABLET ORAL at 08:56

## 2021-09-12 RX ADMIN — OXYCODONE 10 MG: 5 TABLET ORAL at 20:28

## 2021-09-12 RX ADMIN — HYDROCHLOROTHIAZIDE 12.5 MG: 12.5 CAPSULE ORAL at 08:55

## 2021-09-12 RX ADMIN — SODIUM CHLORIDE, PRESERVATIVE FREE 10 ML: 5 INJECTION INTRAVENOUS at 08:58

## 2021-09-12 RX ADMIN — ATENOLOL 50 MG: 50 TABLET ORAL at 08:55

## 2021-09-12 RX ADMIN — HYDRALAZINE HYDROCHLORIDE 25 MG: 25 TABLET, FILM COATED ORAL at 20:29

## 2021-09-12 RX ADMIN — APIXABAN 5 MG: 5 TABLET, FILM COATED ORAL at 20:28

## 2021-09-12 RX ADMIN — APIXABAN 5 MG: 5 TABLET, FILM COATED ORAL at 08:55

## 2021-09-12 RX ADMIN — DOCUSATE SODIUM 100 MG: 100 CAPSULE, LIQUID FILLED ORAL at 17:18

## 2021-09-12 RX ADMIN — HYDRALAZINE HYDROCHLORIDE 25 MG: 25 TABLET, FILM COATED ORAL at 14:25

## 2021-09-12 RX ADMIN — HYDRALAZINE HYDROCHLORIDE 10 MG: 20 INJECTION INTRAMUSCULAR; INTRAVENOUS at 22:45

## 2021-09-12 RX ADMIN — ATORVASTATIN CALCIUM 40 MG: 40 TABLET, FILM COATED ORAL at 20:29

## 2021-09-12 RX ADMIN — INSULIN LISPRO 5 UNITS: 100 INJECTION, SOLUTION INTRAVENOUS; SUBCUTANEOUS at 12:48

## 2021-09-12 RX ADMIN — DOCUSATE SODIUM 100 MG: 100 CAPSULE, LIQUID FILLED ORAL at 08:56

## 2021-09-12 RX ADMIN — MORPHINE SULFATE 2 MG: 2 INJECTION, SOLUTION INTRAMUSCULAR; INTRAVENOUS at 02:25

## 2021-09-12 RX ADMIN — PANTOPRAZOLE SODIUM 40 MG: 40 TABLET, DELAYED RELEASE ORAL at 05:39

## 2021-09-12 RX ADMIN — INSULIN DETEMIR 20 UNITS: 100 INJECTION, SOLUTION SUBCUTANEOUS at 08:56

## 2021-09-12 RX ADMIN — INSULIN LISPRO 2 UNITS: 100 INJECTION, SOLUTION INTRAVENOUS; SUBCUTANEOUS at 12:48

## 2021-09-12 RX ADMIN — OXYCODONE 10 MG: 5 TABLET ORAL at 17:18

## 2021-09-12 RX ADMIN — Medication 100 MCG: at 08:55

## 2021-09-12 RX ADMIN — HYDRALAZINE HYDROCHLORIDE 25 MG: 25 TABLET, FILM COATED ORAL at 05:39

## 2021-09-12 RX ADMIN — MULTIVITAMIN TABLET 1 TABLET: TABLET at 08:56

## 2021-09-12 RX ADMIN — GABAPENTIN 300 MG: 300 CAPSULE ORAL at 20:29

## 2021-09-12 RX ADMIN — ASPIRIN 81 MG: 81 TABLET, COATED ORAL at 08:56

## 2021-09-12 RX ADMIN — LISINOPRIL 40 MG: 40 TABLET ORAL at 08:56

## 2021-09-12 RX ADMIN — PREGABALIN 150 MG: 150 CAPSULE ORAL at 08:55

## 2021-09-12 RX ADMIN — DOCUSATE SODIUM 100 MG: 100 CAPSULE, LIQUID FILLED ORAL at 20:29

## 2021-09-12 RX ADMIN — INSULIN LISPRO 5 UNITS: 100 INJECTION, SOLUTION INTRAVENOUS; SUBCUTANEOUS at 08:57

## 2021-09-13 LAB
GLUCOSE BLDC GLUCOMTR-MCNC: 133 MG/DL (ref 70–130)
GLUCOSE BLDC GLUCOMTR-MCNC: 136 MG/DL (ref 70–130)
GLUCOSE BLDC GLUCOMTR-MCNC: 142 MG/DL (ref 70–130)
GLUCOSE BLDC GLUCOMTR-MCNC: 245 MG/DL (ref 70–130)
SARS-COV-2 RNA RESP QL NAA+PROBE: NOT DETECTED

## 2021-09-13 PROCEDURE — 97116 GAIT TRAINING THERAPY: CPT

## 2021-09-13 PROCEDURE — 99232 SBSQ HOSP IP/OBS MODERATE 35: CPT | Performed by: FAMILY MEDICINE

## 2021-09-13 PROCEDURE — 97535 SELF CARE MNGMENT TRAINING: CPT | Performed by: OCCUPATIONAL THERAPIST

## 2021-09-13 PROCEDURE — U0005 INFEC AGEN DETEC AMPLI PROBE: HCPCS | Performed by: FAMILY MEDICINE

## 2021-09-13 PROCEDURE — 82962 GLUCOSE BLOOD TEST: CPT

## 2021-09-13 PROCEDURE — 97110 THERAPEUTIC EXERCISES: CPT

## 2021-09-13 PROCEDURE — 97110 THERAPEUTIC EXERCISES: CPT | Performed by: OCCUPATIONAL THERAPIST

## 2021-09-13 PROCEDURE — U0003 INFECTIOUS AGENT DETECTION BY NUCLEIC ACID (DNA OR RNA); SEVERE ACUTE RESPIRATORY SYNDROME CORONAVIRUS 2 (SARS-COV-2) (CORONAVIRUS DISEASE [COVID-19]), AMPLIFIED PROBE TECHNIQUE, MAKING USE OF HIGH THROUGHPUT TECHNOLOGIES AS DESCRIBED BY CMS-2020-01-R: HCPCS | Performed by: FAMILY MEDICINE

## 2021-09-13 PROCEDURE — 63710000001 INSULIN LISPRO (HUMAN) PER 5 UNITS: Performed by: FAMILY MEDICINE

## 2021-09-13 PROCEDURE — 63710000001 INSULIN DETEMIR PER 5 UNITS: Performed by: FAMILY MEDICINE

## 2021-09-13 PROCEDURE — 63710000001 INSULIN LISPRO (HUMAN) PER 5 UNITS: Performed by: NURSE PRACTITIONER

## 2021-09-13 RX ORDER — ROPINIROLE 1 MG/1
1 TABLET, FILM COATED ORAL NIGHTLY
Status: DISCONTINUED | OUTPATIENT
Start: 2021-09-13 | End: 2021-09-14 | Stop reason: HOSPADM

## 2021-09-13 RX ADMIN — Medication 100 MCG: at 08:09

## 2021-09-13 RX ADMIN — HYDRALAZINE HYDROCHLORIDE 25 MG: 25 TABLET, FILM COATED ORAL at 15:31

## 2021-09-13 RX ADMIN — INSULIN LISPRO 10 UNITS: 100 INJECTION, SOLUTION INTRAVENOUS; SUBCUTANEOUS at 18:06

## 2021-09-13 RX ADMIN — INSULIN DETEMIR 25 UNITS: 100 INJECTION, SOLUTION SUBCUTANEOUS at 08:14

## 2021-09-13 RX ADMIN — LISINOPRIL 40 MG: 40 TABLET ORAL at 08:09

## 2021-09-13 RX ADMIN — APIXABAN 5 MG: 5 TABLET, FILM COATED ORAL at 08:09

## 2021-09-13 RX ADMIN — DULOXETINE 60 MG: 60 CAPSULE, DELAYED RELEASE ORAL at 08:09

## 2021-09-13 RX ADMIN — DOCUSATE SODIUM 100 MG: 100 CAPSULE, LIQUID FILLED ORAL at 15:31

## 2021-09-13 RX ADMIN — SODIUM CHLORIDE, PRESERVATIVE FREE 10 ML: 5 INJECTION INTRAVENOUS at 08:10

## 2021-09-13 RX ADMIN — OXYCODONE 10 MG: 5 TABLET ORAL at 20:40

## 2021-09-13 RX ADMIN — ATENOLOL 50 MG: 50 TABLET ORAL at 08:09

## 2021-09-13 RX ADMIN — INSULIN LISPRO 3 UNITS: 100 INJECTION, SOLUTION INTRAVENOUS; SUBCUTANEOUS at 12:12

## 2021-09-13 RX ADMIN — MULTIVITAMIN TABLET 1 TABLET: TABLET at 08:09

## 2021-09-13 RX ADMIN — ROPINIROLE HYDROCHLORIDE 1 MG: 1 TABLET, FILM COATED ORAL at 20:40

## 2021-09-13 RX ADMIN — OXYCODONE 10 MG: 5 TABLET ORAL at 08:09

## 2021-09-13 RX ADMIN — ATORVASTATIN CALCIUM 40 MG: 40 TABLET, FILM COATED ORAL at 20:40

## 2021-09-13 RX ADMIN — OXYCODONE 10 MG: 5 TABLET ORAL at 15:31

## 2021-09-13 RX ADMIN — ASPIRIN 81 MG: 81 TABLET, COATED ORAL at 08:09

## 2021-09-13 RX ADMIN — GABAPENTIN 300 MG: 300 CAPSULE ORAL at 20:40

## 2021-09-13 RX ADMIN — DOCUSATE SODIUM 100 MG: 100 CAPSULE, LIQUID FILLED ORAL at 20:40

## 2021-09-13 RX ADMIN — DOCUSATE SODIUM 100 MG: 100 CAPSULE, LIQUID FILLED ORAL at 08:09

## 2021-09-13 RX ADMIN — INSULIN LISPRO 8 UNITS: 100 INJECTION, SOLUTION INTRAVENOUS; SUBCUTANEOUS at 08:09

## 2021-09-13 RX ADMIN — INSULIN LISPRO 8 UNITS: 100 INJECTION, SOLUTION INTRAVENOUS; SUBCUTANEOUS at 12:13

## 2021-09-13 RX ADMIN — PREGABALIN 150 MG: 150 CAPSULE ORAL at 08:09

## 2021-09-13 RX ADMIN — HYDRALAZINE HYDROCHLORIDE 25 MG: 25 TABLET, FILM COATED ORAL at 20:40

## 2021-09-13 RX ADMIN — HYDROCHLOROTHIAZIDE 12.5 MG: 12.5 CAPSULE ORAL at 08:09

## 2021-09-13 RX ADMIN — PANTOPRAZOLE SODIUM 40 MG: 40 TABLET, DELAYED RELEASE ORAL at 05:43

## 2021-09-13 RX ADMIN — HYDRALAZINE HYDROCHLORIDE 25 MG: 25 TABLET, FILM COATED ORAL at 05:43

## 2021-09-13 RX ADMIN — APIXABAN 5 MG: 5 TABLET, FILM COATED ORAL at 20:40

## 2021-09-14 VITALS
TEMPERATURE: 98.2 F | SYSTOLIC BLOOD PRESSURE: 145 MMHG | HEIGHT: 62 IN | WEIGHT: 170.3 LBS | DIASTOLIC BLOOD PRESSURE: 47 MMHG | HEART RATE: 57 BPM | OXYGEN SATURATION: 91 % | BODY MASS INDEX: 31.34 KG/M2 | RESPIRATION RATE: 14 BRPM

## 2021-09-14 PROBLEM — R41.82 ALTERED MENTAL STATUS: Status: RESOLVED | Noted: 2021-09-09 | Resolved: 2021-09-14

## 2021-09-14 PROBLEM — I48.91 ATRIAL FIBRILLATION (HCC): Status: ACTIVE | Noted: 2021-09-14

## 2021-09-14 LAB — GLUCOSE BLDC GLUCOMTR-MCNC: 158 MG/DL (ref 70–130)

## 2021-09-14 PROCEDURE — 92526 ORAL FUNCTION THERAPY: CPT

## 2021-09-14 PROCEDURE — 63710000001 INSULIN LISPRO (HUMAN) PER 5 UNITS: Performed by: NURSE PRACTITIONER

## 2021-09-14 PROCEDURE — 63710000001 INSULIN LISPRO (HUMAN) PER 5 UNITS: Performed by: FAMILY MEDICINE

## 2021-09-14 PROCEDURE — 92507 TX SP LANG VOICE COMM INDIV: CPT

## 2021-09-14 PROCEDURE — 99239 HOSP IP/OBS DSCHRG MGMT >30: CPT | Performed by: PHYSICIAN ASSISTANT

## 2021-09-14 PROCEDURE — 63710000001 INSULIN DETEMIR PER 5 UNITS: Performed by: PHYSICIAN ASSISTANT

## 2021-09-14 PROCEDURE — 82962 GLUCOSE BLOOD TEST: CPT

## 2021-09-14 RX ORDER — OXYCODONE HYDROCHLORIDE AND ACETAMINOPHEN 5; 325 MG/1; MG/1
1 TABLET ORAL EVERY 8 HOURS PRN
Qty: 9 TABLET | Refills: 0 | Status: SHIPPED | OUTPATIENT
Start: 2021-09-14

## 2021-09-14 RX ORDER — DULOXETIN HYDROCHLORIDE 60 MG/1
60 CAPSULE, DELAYED RELEASE ORAL DAILY
Start: 2021-09-15

## 2021-09-14 RX ORDER — PREGABALIN 150 MG/1
150 CAPSULE ORAL DAILY
Qty: 5 CAPSULE | Refills: 0 | Status: SHIPPED | OUTPATIENT
Start: 2021-09-15 | End: 2021-09-14

## 2021-09-14 RX ORDER — LISINOPRIL 40 MG/1
40 TABLET ORAL
Start: 2021-09-15

## 2021-09-14 RX ORDER — ACETAMINOPHEN 325 MG/1
650 TABLET ORAL EVERY 6 HOURS PRN
Start: 2021-09-14

## 2021-09-14 RX ORDER — UBIDECARENONE 75 MG
100 CAPSULE ORAL DAILY
Start: 2021-09-15

## 2021-09-14 RX ORDER — TERAZOSIN 1 MG/1
1 CAPSULE ORAL NIGHTLY
Start: 2021-09-14

## 2021-09-14 RX ORDER — HYDROCHLOROTHIAZIDE 12.5 MG/1
12.5 CAPSULE, GELATIN COATED ORAL DAILY
Start: 2021-09-15

## 2021-09-14 RX ORDER — HYDRALAZINE HYDROCHLORIDE 25 MG/1
25 TABLET, FILM COATED ORAL EVERY 8 HOURS SCHEDULED
Start: 2021-09-14

## 2021-09-14 RX ORDER — GABAPENTIN 300 MG/1
300 CAPSULE ORAL NIGHTLY
Qty: 5 CAPSULE | Refills: 0 | Status: SHIPPED | OUTPATIENT
Start: 2021-09-14 | End: 2021-09-14

## 2021-09-14 RX ORDER — DIPHENOXYLATE HYDROCHLORIDE AND ATROPINE SULFATE 2.5; .025 MG/1; MG/1
1 TABLET ORAL DAILY
Qty: 30 TABLET
Start: 2021-09-15

## 2021-09-14 RX ORDER — AMITRIPTYLINE HYDROCHLORIDE 75 MG/1
75 TABLET, FILM COATED ORAL NIGHTLY PRN
Start: 2021-09-14

## 2021-09-14 RX ORDER — ERGOCALCIFEROL (VITAMIN D2) 50 MCG
1 CAPSULE ORAL DAILY
Qty: 30 CAPSULE
Start: 2021-09-14

## 2021-09-14 RX ORDER — PREGABALIN 150 MG/1
150 CAPSULE ORAL DAILY
Qty: 3 CAPSULE | Refills: 0 | Status: SHIPPED | OUTPATIENT
Start: 2021-09-15

## 2021-09-14 RX ORDER — GABAPENTIN 300 MG/1
300 CAPSULE ORAL NIGHTLY
Qty: 3 CAPSULE | Refills: 0 | Status: SHIPPED | OUTPATIENT
Start: 2021-09-14

## 2021-09-14 RX ORDER — ATENOLOL 50 MG/1
50 TABLET ORAL
Start: 2021-09-15

## 2021-09-14 RX ORDER — ATORVASTATIN CALCIUM 40 MG/1
40 TABLET, FILM COATED ORAL NIGHTLY
Start: 2021-09-14

## 2021-09-14 RX ORDER — PSEUDOEPHEDRINE HCL 30 MG
100 TABLET ORAL 3 TIMES DAILY
Start: 2021-09-14

## 2021-09-14 RX ORDER — TERAZOSIN 1 MG/1
1 CAPSULE ORAL NIGHTLY
Status: DISCONTINUED | OUTPATIENT
Start: 2021-09-14 | End: 2021-09-14 | Stop reason: HOSPADM

## 2021-09-14 RX ADMIN — DULOXETINE 60 MG: 60 CAPSULE, DELAYED RELEASE ORAL at 08:07

## 2021-09-14 RX ADMIN — HYDROCHLOROTHIAZIDE 12.5 MG: 12.5 CAPSULE ORAL at 08:07

## 2021-09-14 RX ADMIN — LISINOPRIL 40 MG: 40 TABLET ORAL at 08:06

## 2021-09-14 RX ADMIN — INSULIN LISPRO 10 UNITS: 100 INJECTION, SOLUTION INTRAVENOUS; SUBCUTANEOUS at 08:10

## 2021-09-14 RX ADMIN — MULTIVITAMIN TABLET 1 TABLET: TABLET at 08:06

## 2021-09-14 RX ADMIN — HYDRALAZINE HYDROCHLORIDE 25 MG: 25 TABLET, FILM COATED ORAL at 06:18

## 2021-09-14 RX ADMIN — OXYCODONE 10 MG: 5 TABLET ORAL at 08:07

## 2021-09-14 RX ADMIN — Medication 100 MCG: at 08:07

## 2021-09-14 RX ADMIN — ASPIRIN 81 MG: 81 TABLET, COATED ORAL at 08:07

## 2021-09-14 RX ADMIN — ATENOLOL 50 MG: 50 TABLET ORAL at 08:06

## 2021-09-14 RX ADMIN — DOCUSATE SODIUM 100 MG: 100 CAPSULE, LIQUID FILLED ORAL at 08:07

## 2021-09-14 RX ADMIN — INSULIN LISPRO 2 UNITS: 100 INJECTION, SOLUTION INTRAVENOUS; SUBCUTANEOUS at 08:07

## 2021-09-14 RX ADMIN — PANTOPRAZOLE SODIUM 40 MG: 40 TABLET, DELAYED RELEASE ORAL at 06:18

## 2021-09-14 RX ADMIN — APIXABAN 5 MG: 5 TABLET, FILM COATED ORAL at 08:10

## 2021-09-14 RX ADMIN — INSULIN DETEMIR 25 UNITS: 100 INJECTION, SOLUTION SUBCUTANEOUS at 08:14

## 2021-09-14 RX ADMIN — PREGABALIN 150 MG: 150 CAPSULE ORAL at 08:07

## 2021-09-20 LAB
QT INTERVAL: 424 MS
QTC INTERVAL: 467 MS

## 2025-01-12 NOTE — TELEPHONE ENCOUNTER
Called patient and Left a detailed message that her Patient Assistance Medication was in and was available to  and gave office hours and phone number for any questions she may have    regular solid thin liquid